# Patient Record
Sex: MALE | Race: WHITE | Employment: OTHER | URBAN - METROPOLITAN AREA
[De-identification: names, ages, dates, MRNs, and addresses within clinical notes are randomized per-mention and may not be internally consistent; named-entity substitution may affect disease eponyms.]

---

## 2017-01-17 ENCOUNTER — HOSPITAL ENCOUNTER (OUTPATIENT)
Dept: ULTRASOUND IMAGING | Age: 82
Discharge: HOME | End: 2017-01-17
Admitting: INTERNAL MEDICINE

## 2017-01-17 DIAGNOSIS — Z13.6 SCREENING FOR AAA (ABDOMINAL AORTIC ANEURYSM): ICD-10-CM

## 2017-01-17 DIAGNOSIS — I65.29 STENOSIS OF CAROTID ARTERY, UNSPECIFIED LATERALITY: Primary | ICD-10-CM

## 2017-01-17 DIAGNOSIS — I71.9 AORTIC ANEURYSM WITHOUT RUPTURE (HCC): ICD-10-CM

## 2017-01-20 ENCOUNTER — OFFICE VISIT (OUTPATIENT)
Dept: PULMONOLOGY | Age: 82
End: 2017-01-20

## 2017-01-20 VITALS
BODY MASS INDEX: 33.45 KG/M2 | WEIGHT: 201 LBS | SYSTOLIC BLOOD PRESSURE: 96 MMHG | OXYGEN SATURATION: 92 % | DIASTOLIC BLOOD PRESSURE: 54 MMHG | HEART RATE: 72 BPM

## 2017-01-20 DIAGNOSIS — G47.33 OBSTRUCTIVE SLEEP APNEA: ICD-10-CM

## 2017-01-20 DIAGNOSIS — J44.9 COPD, MILD (HCC): Primary | ICD-10-CM

## 2017-01-20 DIAGNOSIS — J84.10 PULMONARY FIBROSIS (HCC): ICD-10-CM

## 2017-01-20 DIAGNOSIS — J96.11 CHRONIC RESPIRATORY FAILURE WITH HYPOXIA (HCC): ICD-10-CM

## 2017-01-20 DIAGNOSIS — J43.2 CENTRILOBULAR EMPHYSEMA (HCC): ICD-10-CM

## 2017-01-20 PROCEDURE — G8598 ASA/ANTIPLAT THER USED: HCPCS | Performed by: INTERNAL MEDICINE

## 2017-01-20 PROCEDURE — 4040F PNEUMOC VAC/ADMIN/RCVD: CPT | Performed by: INTERNAL MEDICINE

## 2017-01-20 PROCEDURE — 3023F SPIROM DOC REV: CPT | Performed by: INTERNAL MEDICINE

## 2017-01-20 PROCEDURE — G8427 DOCREV CUR MEDS BY ELIG CLIN: HCPCS | Performed by: INTERNAL MEDICINE

## 2017-01-20 PROCEDURE — 1036F TOBACCO NON-USER: CPT | Performed by: INTERNAL MEDICINE

## 2017-01-20 PROCEDURE — G8484 FLU IMMUNIZE NO ADMIN: HCPCS | Performed by: INTERNAL MEDICINE

## 2017-01-20 PROCEDURE — G8419 CALC BMI OUT NRM PARAM NOF/U: HCPCS | Performed by: INTERNAL MEDICINE

## 2017-01-20 PROCEDURE — 99214 OFFICE O/P EST MOD 30 MIN: CPT | Performed by: INTERNAL MEDICINE

## 2017-01-20 PROCEDURE — 1123F ACP DISCUSS/DSCN MKR DOCD: CPT | Performed by: INTERNAL MEDICINE

## 2017-01-20 PROCEDURE — G8926 SPIRO NO PERF OR DOC: HCPCS | Performed by: INTERNAL MEDICINE

## 2017-03-03 ENCOUNTER — OFFICE VISIT (OUTPATIENT)
Dept: FAMILY MEDICINE CLINIC | Age: 82
End: 2017-03-03

## 2017-03-03 VITALS
OXYGEN SATURATION: 93 % | DIASTOLIC BLOOD PRESSURE: 58 MMHG | BODY MASS INDEX: 32.78 KG/M2 | HEART RATE: 91 BPM | SYSTOLIC BLOOD PRESSURE: 132 MMHG | WEIGHT: 197 LBS

## 2017-03-03 DIAGNOSIS — E78.00 PURE HYPERCHOLESTEROLEMIA: ICD-10-CM

## 2017-03-03 DIAGNOSIS — I65.23 BILATERAL CAROTID ARTERY STENOSIS: ICD-10-CM

## 2017-03-03 DIAGNOSIS — E66.09 NON MORBID OBESITY DUE TO EXCESS CALORIES: ICD-10-CM

## 2017-03-03 DIAGNOSIS — I10 ESSENTIAL HYPERTENSION: ICD-10-CM

## 2017-03-03 DIAGNOSIS — Z99.81 OXYGEN DEPENDENT: ICD-10-CM

## 2017-03-03 DIAGNOSIS — I25.10 CORONARY ARTERY DISEASE INVOLVING NATIVE CORONARY ARTERY OF NATIVE HEART WITHOUT ANGINA PECTORIS: ICD-10-CM

## 2017-03-03 DIAGNOSIS — H35.351 CYSTOID MACULAR DEGENERATION OF RIGHT EYE: ICD-10-CM

## 2017-03-03 DIAGNOSIS — J44.1 COPD WITH EXACERBATION (HCC): Primary | ICD-10-CM

## 2017-03-03 DIAGNOSIS — E11.9 TYPE 2 DIABETES MELLITUS WITHOUT COMPLICATION, WITHOUT LONG-TERM CURRENT USE OF INSULIN (HCC): ICD-10-CM

## 2017-03-03 PROBLEM — H35.30 MACULAR DEGENERATION: Status: ACTIVE | Noted: 2017-03-03

## 2017-03-03 PROCEDURE — G8419 CALC BMI OUT NRM PARAM NOF/U: HCPCS | Performed by: FAMILY MEDICINE

## 2017-03-03 PROCEDURE — 3023F SPIROM DOC REV: CPT | Performed by: FAMILY MEDICINE

## 2017-03-03 PROCEDURE — G8427 DOCREV CUR MEDS BY ELIG CLIN: HCPCS | Performed by: FAMILY MEDICINE

## 2017-03-03 PROCEDURE — G8484 FLU IMMUNIZE NO ADMIN: HCPCS | Performed by: FAMILY MEDICINE

## 2017-03-03 PROCEDURE — 1036F TOBACCO NON-USER: CPT | Performed by: FAMILY MEDICINE

## 2017-03-03 PROCEDURE — 1123F ACP DISCUSS/DSCN MKR DOCD: CPT | Performed by: FAMILY MEDICINE

## 2017-03-03 PROCEDURE — G8926 SPIRO NO PERF OR DOC: HCPCS | Performed by: FAMILY MEDICINE

## 2017-03-03 PROCEDURE — 4040F PNEUMOC VAC/ADMIN/RCVD: CPT | Performed by: FAMILY MEDICINE

## 2017-03-03 PROCEDURE — 99203 OFFICE O/P NEW LOW 30 MIN: CPT | Performed by: FAMILY MEDICINE

## 2017-03-03 PROCEDURE — G8598 ASA/ANTIPLAT THER USED: HCPCS | Performed by: FAMILY MEDICINE

## 2017-03-03 RX ORDER — PREDNISONE 20 MG/1
TABLET ORAL
Qty: 16 TABLET | Refills: 0 | Status: SHIPPED | OUTPATIENT
Start: 2017-03-03 | End: 2017-07-22

## 2017-05-31 ENCOUNTER — OFFICE VISIT (OUTPATIENT)
Dept: FAMILY MEDICINE CLINIC | Age: 82
End: 2017-05-31

## 2017-05-31 VITALS
WEIGHT: 195 LBS | DIASTOLIC BLOOD PRESSURE: 54 MMHG | BODY MASS INDEX: 32.45 KG/M2 | SYSTOLIC BLOOD PRESSURE: 122 MMHG | OXYGEN SATURATION: 93 % | HEART RATE: 87 BPM

## 2017-05-31 DIAGNOSIS — R00.2 PALPITATION: ICD-10-CM

## 2017-05-31 DIAGNOSIS — I10 ESSENTIAL HYPERTENSION: ICD-10-CM

## 2017-05-31 DIAGNOSIS — M10.071 ACUTE IDIOPATHIC GOUT INVOLVING TOE OF RIGHT FOOT: ICD-10-CM

## 2017-05-31 DIAGNOSIS — E11.9 TYPE 2 DIABETES MELLITUS WITHOUT COMPLICATION, WITHOUT LONG-TERM CURRENT USE OF INSULIN (HCC): Primary | ICD-10-CM

## 2017-05-31 PROCEDURE — 1036F TOBACCO NON-USER: CPT | Performed by: FAMILY MEDICINE

## 2017-05-31 PROCEDURE — G8598 ASA/ANTIPLAT THER USED: HCPCS | Performed by: FAMILY MEDICINE

## 2017-05-31 PROCEDURE — G8419 CALC BMI OUT NRM PARAM NOF/U: HCPCS | Performed by: FAMILY MEDICINE

## 2017-05-31 PROCEDURE — 1123F ACP DISCUSS/DSCN MKR DOCD: CPT | Performed by: FAMILY MEDICINE

## 2017-05-31 PROCEDURE — G8427 DOCREV CUR MEDS BY ELIG CLIN: HCPCS | Performed by: FAMILY MEDICINE

## 2017-05-31 PROCEDURE — 99214 OFFICE O/P EST MOD 30 MIN: CPT | Performed by: FAMILY MEDICINE

## 2017-05-31 PROCEDURE — 93000 ELECTROCARDIOGRAM COMPLETE: CPT | Performed by: FAMILY MEDICINE

## 2017-05-31 PROCEDURE — 4040F PNEUMOC VAC/ADMIN/RCVD: CPT | Performed by: FAMILY MEDICINE

## 2017-05-31 RX ORDER — METOPROLOL SUCCINATE 50 MG/1
50 TABLET, EXTENDED RELEASE ORAL NIGHTLY
Qty: 90 TABLET | Refills: 3 | Status: SHIPPED | OUTPATIENT
Start: 2017-05-31 | End: 2018-06-07 | Stop reason: SDUPTHER

## 2017-05-31 RX ORDER — CLOPIDOGREL BISULFATE 75 MG/1
75 TABLET ORAL DAILY
Qty: 90 TABLET | Refills: 3 | Status: SHIPPED | OUTPATIENT
Start: 2017-05-31 | End: 2018-06-07 | Stop reason: SDUPTHER

## 2017-05-31 RX ORDER — SIMVASTATIN 40 MG
40 TABLET ORAL NIGHTLY
Qty: 90 TABLET | Refills: 3 | Status: SHIPPED | OUTPATIENT
Start: 2017-05-31 | End: 2018-03-21 | Stop reason: SDUPTHER

## 2017-05-31 RX ORDER — LISINOPRIL 5 MG/1
5 TABLET ORAL DAILY
Qty: 90 TABLET | Refills: 3 | Status: SHIPPED | OUTPATIENT
Start: 2017-05-31 | End: 2018-07-05 | Stop reason: SDUPTHER

## 2017-06-01 LAB
MAGNESIUM: 1.9 MG/DL (ref 1.5–2.5)
TSH ULTRASENSITIVE: 0.96 MIU/L (ref 0.4–4.5)
URIC ACID, SERUM: 8.5 MG/DL (ref 4–8)

## 2017-06-07 RX ORDER — ALLOPURINOL 100 MG/1
100 TABLET ORAL DAILY
Qty: 30 TABLET | Refills: 2 | Status: SHIPPED | OUTPATIENT
Start: 2017-06-07 | End: 2017-09-06 | Stop reason: SDUPTHER

## 2017-06-26 RX ORDER — LANCETS 33 GAUGE
EACH MISCELLANEOUS
Qty: 100 EACH | Refills: 12 | Status: SHIPPED | OUTPATIENT
Start: 2017-06-26 | End: 2021-01-01 | Stop reason: SDUPTHER

## 2017-07-21 ENCOUNTER — PATIENT MESSAGE (OUTPATIENT)
Dept: FAMILY MEDICINE CLINIC | Age: 82
End: 2017-07-21

## 2017-07-25 ENCOUNTER — OFFICE VISIT (OUTPATIENT)
Dept: PULMONOLOGY | Age: 82
End: 2017-07-25

## 2017-07-25 VITALS
HEART RATE: 69 BPM | WEIGHT: 196.6 LBS | BODY MASS INDEX: 32.72 KG/M2 | RESPIRATION RATE: 22 BRPM | OXYGEN SATURATION: 95 % | DIASTOLIC BLOOD PRESSURE: 65 MMHG | SYSTOLIC BLOOD PRESSURE: 121 MMHG

## 2017-07-25 DIAGNOSIS — J84.10 PULMONARY FIBROSIS (HCC): ICD-10-CM

## 2017-07-25 DIAGNOSIS — G47.33 OBSTRUCTIVE SLEEP APNEA: ICD-10-CM

## 2017-07-25 DIAGNOSIS — J44.9 COPD, MILD (HCC): Primary | ICD-10-CM

## 2017-07-25 DIAGNOSIS — J96.11 CHRONIC RESPIRATORY FAILURE WITH HYPOXIA (HCC): ICD-10-CM

## 2017-07-25 PROCEDURE — G8598 ASA/ANTIPLAT THER USED: HCPCS | Performed by: INTERNAL MEDICINE

## 2017-07-25 PROCEDURE — G8419 CALC BMI OUT NRM PARAM NOF/U: HCPCS | Performed by: INTERNAL MEDICINE

## 2017-07-25 PROCEDURE — 99214 OFFICE O/P EST MOD 30 MIN: CPT | Performed by: INTERNAL MEDICINE

## 2017-07-25 PROCEDURE — 1123F ACP DISCUSS/DSCN MKR DOCD: CPT | Performed by: INTERNAL MEDICINE

## 2017-07-25 PROCEDURE — 4040F PNEUMOC VAC/ADMIN/RCVD: CPT | Performed by: INTERNAL MEDICINE

## 2017-07-25 PROCEDURE — 1036F TOBACCO NON-USER: CPT | Performed by: INTERNAL MEDICINE

## 2017-07-25 PROCEDURE — G8926 SPIRO NO PERF OR DOC: HCPCS | Performed by: INTERNAL MEDICINE

## 2017-07-25 PROCEDURE — 3023F SPIROM DOC REV: CPT | Performed by: INTERNAL MEDICINE

## 2017-07-25 PROCEDURE — G8427 DOCREV CUR MEDS BY ELIG CLIN: HCPCS | Performed by: INTERNAL MEDICINE

## 2017-07-25 RX ORDER — PREDNISONE 10 MG/1
TABLET ORAL
Qty: 30 TABLET | Refills: 0 | Status: SHIPPED | OUTPATIENT
Start: 2017-07-25 | End: 2017-08-04

## 2017-08-22 RX ORDER — BUDESONIDE AND FORMOTEROL FUMARATE DIHYDRATE 160; 4.5 UG/1; UG/1
AEROSOL RESPIRATORY (INHALATION)
Qty: 3 INHALER | Refills: 3 | Status: SHIPPED | OUTPATIENT
Start: 2017-08-22 | End: 2017-11-30

## 2017-08-24 ENCOUNTER — TELEPHONE (OUTPATIENT)
Dept: FAMILY MEDICINE CLINIC | Age: 82
End: 2017-08-24

## 2017-08-25 RX ORDER — BUDESONIDE AND FORMOTEROL FUMARATE DIHYDRATE 160; 4.5 UG/1; UG/1
AEROSOL RESPIRATORY (INHALATION)
Qty: 1 INHALER | Refills: 6 | Status: SHIPPED | OUTPATIENT
Start: 2017-08-25 | End: 2019-01-16 | Stop reason: SDUPTHER

## 2017-09-06 RX ORDER — ALLOPURINOL 100 MG/1
TABLET ORAL
Qty: 30 TABLET | Refills: 1 | Status: SHIPPED | OUTPATIENT
Start: 2017-09-06 | End: 2017-11-07

## 2017-10-17 ENCOUNTER — PATIENT MESSAGE (OUTPATIENT)
Dept: FAMILY MEDICINE CLINIC | Age: 82
End: 2017-10-17

## 2017-10-17 NOTE — TELEPHONE ENCOUNTER
From: Brionna Jaffe  To: Manan Cross MD  Sent: 10/17/2017 12:52 PM EDT  Subject: Prescription Question    I am in need of script for one touch Dianna test strip    Thanks

## 2017-11-01 ENCOUNTER — HOSPITAL ENCOUNTER (OUTPATIENT)
Dept: OTHER | Age: 82
Discharge: HOME | End: 2017-11-30
Attending: INTERNAL MEDICINE | Admitting: INTERNAL MEDICINE

## 2017-11-07 RX ORDER — ALLOPURINOL 100 MG/1
TABLET ORAL
Qty: 30 TABLET | Refills: 0 | Status: SHIPPED | OUTPATIENT
Start: 2017-11-07 | End: 2017-12-07

## 2017-11-30 ENCOUNTER — OFFICE VISIT (OUTPATIENT)
Dept: FAMILY MEDICINE CLINIC | Age: 82
End: 2017-11-30

## 2017-11-30 VITALS
BODY MASS INDEX: 32.78 KG/M2 | OXYGEN SATURATION: 93 % | HEART RATE: 76 BPM | SYSTOLIC BLOOD PRESSURE: 112 MMHG | WEIGHT: 197 LBS | DIASTOLIC BLOOD PRESSURE: 58 MMHG

## 2017-11-30 DIAGNOSIS — M1A.9XX0 CHRONIC GOUT WITHOUT TOPHUS, UNSPECIFIED CAUSE, UNSPECIFIED SITE: ICD-10-CM

## 2017-11-30 DIAGNOSIS — E78.00 PURE HYPERCHOLESTEROLEMIA: ICD-10-CM

## 2017-11-30 DIAGNOSIS — E11.9 TYPE 2 DIABETES MELLITUS WITHOUT COMPLICATION, WITHOUT LONG-TERM CURRENT USE OF INSULIN (HCC): Primary | ICD-10-CM

## 2017-11-30 DIAGNOSIS — I10 ESSENTIAL HYPERTENSION: ICD-10-CM

## 2017-11-30 PROCEDURE — 99214 OFFICE O/P EST MOD 30 MIN: CPT | Performed by: FAMILY MEDICINE

## 2017-11-30 PROCEDURE — 1123F ACP DISCUSS/DSCN MKR DOCD: CPT | Performed by: FAMILY MEDICINE

## 2017-11-30 PROCEDURE — G8484 FLU IMMUNIZE NO ADMIN: HCPCS | Performed by: FAMILY MEDICINE

## 2017-11-30 PROCEDURE — 1036F TOBACCO NON-USER: CPT | Performed by: FAMILY MEDICINE

## 2017-11-30 PROCEDURE — G8598 ASA/ANTIPLAT THER USED: HCPCS | Performed by: FAMILY MEDICINE

## 2017-11-30 PROCEDURE — 4040F PNEUMOC VAC/ADMIN/RCVD: CPT | Performed by: FAMILY MEDICINE

## 2017-11-30 PROCEDURE — G8417 CALC BMI ABV UP PARAM F/U: HCPCS | Performed by: FAMILY MEDICINE

## 2017-11-30 PROCEDURE — G8427 DOCREV CUR MEDS BY ELIG CLIN: HCPCS | Performed by: FAMILY MEDICINE

## 2017-11-30 RX ORDER — OMEGA-3-ACID ETHYL ESTERS 1 G/1
CAPSULE, LIQUID FILLED ORAL
COMMUNITY
Start: 2017-11-13 | End: 2017-11-30

## 2017-11-30 RX ORDER — ALBUTEROL SULFATE 90 UG/1
2 AEROSOL, METERED RESPIRATORY (INHALATION) PRN
Qty: 3 INHALER | Refills: 3 | Status: SHIPPED | OUTPATIENT
Start: 2017-11-30 | End: 2017-12-05 | Stop reason: SDUPTHER

## 2017-11-30 ASSESSMENT — PATIENT HEALTH QUESTIONNAIRE - PHQ9
SUM OF ALL RESPONSES TO PHQ QUESTIONS 1-9: 0
1. LITTLE INTEREST OR PLEASURE IN DOING THINGS: 0
2. FEELING DOWN, DEPRESSED OR HOPELESS: 0
SUM OF ALL RESPONSES TO PHQ9 QUESTIONS 1 & 2: 0

## 2017-11-30 NOTE — PROGRESS NOTES
hypertension  -Stable, continue current medications. Chronic gout without tophus, unspecified cause, unspecified site  -     URIC ACID; Future  Recheck labs  No sx  Cont allopurinol    Pure hypercholesterolemia  Discussed stopping omega 3 due to cost, recheck chol next visit  Cont zocor    Other orders  -     albuterol sulfate HFA (PROAIR HFA) 108 (90 Base) MCG/ACT inhaler;  Inhale 2 puffs into the lungs as needed for Shortness of Breath

## 2017-12-01 ENCOUNTER — HOSPITAL ENCOUNTER (OUTPATIENT)
Dept: OTHER | Age: 82
Discharge: HOME | End: 2017-12-31
Attending: INTERNAL MEDICINE | Admitting: INTERNAL MEDICINE

## 2017-12-05 RX ORDER — ALBUTEROL SULFATE 90 UG/1
2 AEROSOL, METERED RESPIRATORY (INHALATION) DAILY PRN
Qty: 3 INHALER | Refills: 3 | Status: SHIPPED | OUTPATIENT
Start: 2017-12-05 | End: 2018-07-10 | Stop reason: SDUPTHER

## 2017-12-07 RX ORDER — ALLOPURINOL 100 MG/1
TABLET ORAL
Qty: 30 TABLET | Refills: 5 | Status: SHIPPED | OUTPATIENT
Start: 2017-12-07 | End: 2018-03-28 | Stop reason: SDUPTHER

## 2018-01-01 ENCOUNTER — HOSPITAL ENCOUNTER (OUTPATIENT)
Dept: OTHER | Age: 83
Discharge: HOME | End: 2018-01-31
Attending: INTERNAL MEDICINE | Admitting: INTERNAL MEDICINE

## 2018-01-29 ENCOUNTER — OFFICE VISIT (OUTPATIENT)
Dept: PULMONOLOGY | Age: 83
End: 2018-01-29

## 2018-01-29 VITALS
SYSTOLIC BLOOD PRESSURE: 104 MMHG | DIASTOLIC BLOOD PRESSURE: 60 MMHG | OXYGEN SATURATION: 92 % | WEIGHT: 192 LBS | BODY MASS INDEX: 31.95 KG/M2 | HEART RATE: 75 BPM

## 2018-01-29 DIAGNOSIS — G47.33 OBSTRUCTIVE SLEEP APNEA: ICD-10-CM

## 2018-01-29 DIAGNOSIS — J44.9 COPD, MILD (HCC): ICD-10-CM

## 2018-01-29 DIAGNOSIS — J43.2 CENTRILOBULAR EMPHYSEMA (HCC): Primary | ICD-10-CM

## 2018-01-29 DIAGNOSIS — J84.10 PULMONARY FIBROSIS (HCC): ICD-10-CM

## 2018-01-29 PROCEDURE — G8484 FLU IMMUNIZE NO ADMIN: HCPCS | Performed by: INTERNAL MEDICINE

## 2018-01-29 PROCEDURE — G8427 DOCREV CUR MEDS BY ELIG CLIN: HCPCS | Performed by: INTERNAL MEDICINE

## 2018-01-29 PROCEDURE — 3023F SPIROM DOC REV: CPT | Performed by: INTERNAL MEDICINE

## 2018-01-29 PROCEDURE — G8598 ASA/ANTIPLAT THER USED: HCPCS | Performed by: INTERNAL MEDICINE

## 2018-01-29 PROCEDURE — G8417 CALC BMI ABV UP PARAM F/U: HCPCS | Performed by: INTERNAL MEDICINE

## 2018-01-29 PROCEDURE — 1036F TOBACCO NON-USER: CPT | Performed by: INTERNAL MEDICINE

## 2018-01-29 PROCEDURE — G8926 SPIRO NO PERF OR DOC: HCPCS | Performed by: INTERNAL MEDICINE

## 2018-01-29 PROCEDURE — 99214 OFFICE O/P EST MOD 30 MIN: CPT | Performed by: INTERNAL MEDICINE

## 2018-01-29 PROCEDURE — 1123F ACP DISCUSS/DSCN MKR DOCD: CPT | Performed by: INTERNAL MEDICINE

## 2018-01-29 PROCEDURE — 4040F PNEUMOC VAC/ADMIN/RCVD: CPT | Performed by: INTERNAL MEDICINE

## 2018-01-29 NOTE — PROGRESS NOTES
[facility]  Progress Note  Abbe Primrose, MD Renae Suma   YOB: 1932    Date of Visit:  1/29/2018    Assessment/Plan:  1. COPD, mild ([address]  PFT 1/16:  INTERPRETATION:  1. Spirometry showed increased FVC of 3.23 L, 125% predicted, and normal  FEV-1 of 1.98 L, 101% predicted. The FEV-1/FVC ratio was decreased at 61%. No response to bronchodilators demonstrated on spirometry. 2. Lung volume showed normal total lung capacity of 103% with increased  vital capacity of 125% predicted. 3. Diffusion capacity showed decreased DLCO of 59% predicted.      IMPRESSION: Mild obstructive defect on spirometry with no response to  bronchodilators. Some air trapping was present on lung volumes and moderate  decrease in diffusion capacity. In comparison to the test that was done in  February of 2014, no significant change in forced vital capacity and FEV-1  noted, but total lung capacity was decreased by 15% and diffusion capacity of  carbon monoxide by 10%. Symbicort  Spiriva  HHN QAM and prn  Exercising  Repeat PFT    2. Centrilobular emphysema ([address]  CT Chest: 7/17:    FINDINGS:   Pulmonary Arteries: Pulmonary arteries are adequately opacified for   evaluation.  No evidence of intraluminal filling defect to suggest pulmonary   embolism.  Main pulmonary artery is normal in caliber.       Mediastinum: Borderline enlarged lymph nodes within the mediastinum are   unchanged.  No thoracic aortic dissection.       Lungs/pleura: Severe emphysema is noted in the upper lungs.  Interstitial   fibrosis within the lower lungs is re- demonstrated.       Upper Abdomen: Limited images of the upper abdomen are unremarkable.       Soft Tissues/Bones: No acute bone or soft tissue abnormality.            3. Pulmonary fibrosis ([address]  CT Chest: 7/17:    FINDINGS:   Pulmonary Arteries: Pulmonary arteries are adequately opacified for   evaluation.  No evidence of intraluminal Smoking Status    Former Smoker    Packs/day: 2.00    Years: 50.00    Quit date: 1/1/2001   Smokeless Tobacco    Never Used

## 2018-02-01 ENCOUNTER — HOSPITAL ENCOUNTER (OUTPATIENT)
Dept: OTHER | Age: 83
Discharge: HOME | End: 2018-02-28
Attending: INTERNAL MEDICINE | Admitting: INTERNAL MEDICINE

## 2018-02-06 ENCOUNTER — HOSPITAL ENCOUNTER (OUTPATIENT)
Dept: PULMONOLOGY | Age: 83
Discharge: HOME | End: 2018-02-06
Attending: INTERNAL MEDICINE | Admitting: INTERNAL MEDICINE

## 2018-02-06 VITALS — HEART RATE: 66 BPM | OXYGEN SATURATION: 95 %

## 2018-02-06 DIAGNOSIS — J43.2 CENTRILOBULAR EMPHYSEMA (HCC): ICD-10-CM

## 2018-02-08 NOTE — PROCEDURES
[facility]                      [facility], [address]                                PULMONARY FUNCTION    PATIENT NAME: Levi Oswald                     :        10/03/1932  MED REC NO:   [id number]                          ROOM:  ACCOUNT NO:   [de-identified]                          ADMIT DATE: 2018  PROVIDER:     Suzan Salmeron MD    DATE OF PROCEDURE:  2018    INDICATION:  Emphysema. INTERPRETATION:  Spirometry attempts were acceptable and reproducible. FVC  was normal at 3.48 liters, 119% predicted and normal FEV1 of 2.02 liters,  101% predicted. FEV1/FVC ratio was decreased at 58%. Lung volumes showed  normal total lung capacity of 112% predicted. Diffusion capacity showed  decreased DLCO of 48% predicted. IMPRESSION:  Mild obstructive defect on spirometry with normal lung volumes  but moderate-to-severe decrease in diffusion capacity. In comparison to  the test that was done in 2016, no significant change in FVC or FEV1. Total lung capacity has improved by 9% and DLCO decreased by 10%.         Grant Kirkland MD    D: 2018 11:23:43       T: 2018 11:25:46     [email or web address]  Job#: [id number]     Doc#: [id number]    CC:

## 2018-03-01 ENCOUNTER — HOSPITAL ENCOUNTER (OUTPATIENT)
Dept: OTHER | Age: 83
Discharge: HOME | End: 2018-03-31
Attending: INTERNAL MEDICINE | Admitting: INTERNAL MEDICINE

## 2018-03-21 RX ORDER — SIMVASTATIN 40 MG
40 TABLET ORAL NIGHTLY
Qty: 90 TABLET | Refills: 3 | Status: SHIPPED | OUTPATIENT
Start: 2018-03-21 | End: 2019-01-16 | Stop reason: SDUPTHER

## 2018-03-28 RX ORDER — ALLOPURINOL 100 MG/1
TABLET ORAL
Qty: 90 TABLET | Refills: 3 | Status: SHIPPED | OUTPATIENT
Start: 2018-03-28 | End: 2019-01-16 | Stop reason: SDUPTHER

## 2018-04-01 ENCOUNTER — HOSPITAL ENCOUNTER (OUTPATIENT)
Dept: OTHER | Age: 83
Discharge: HOME | End: 2018-04-30
Attending: INTERNAL MEDICINE | Admitting: INTERNAL MEDICINE

## 2018-05-01 ENCOUNTER — HOSPITAL ENCOUNTER (OUTPATIENT)
Dept: OTHER | Age: 83
Discharge: HOME | End: 2018-05-31
Attending: INTERNAL MEDICINE | Admitting: INTERNAL MEDICINE

## 2018-05-30 ENCOUNTER — OFFICE VISIT (OUTPATIENT)
Dept: FAMILY MEDICINE CLINIC | Age: 83
End: 2018-05-30

## 2018-05-30 VITALS
HEART RATE: 82 BPM | OXYGEN SATURATION: 92 % | WEIGHT: 193 LBS | BODY MASS INDEX: 32.12 KG/M2 | SYSTOLIC BLOOD PRESSURE: 104 MMHG | DIASTOLIC BLOOD PRESSURE: 48 MMHG

## 2018-05-30 DIAGNOSIS — I10 ESSENTIAL HYPERTENSION: ICD-10-CM

## 2018-05-30 DIAGNOSIS — E11.9 TYPE 2 DIABETES MELLITUS WITHOUT COMPLICATION, WITHOUT LONG-TERM CURRENT USE OF INSULIN (HCC): Primary | ICD-10-CM

## 2018-05-30 DIAGNOSIS — M67.479 MUCOUS CYST OF TOE: ICD-10-CM

## 2018-05-30 DIAGNOSIS — E78.00 PURE HYPERCHOLESTEROLEMIA: ICD-10-CM

## 2018-05-30 DIAGNOSIS — M1A.9XX0 CHRONIC GOUT WITHOUT TOPHUS, UNSPECIFIED CAUSE, UNSPECIFIED SITE: ICD-10-CM

## 2018-05-30 DIAGNOSIS — R00.0 TACHYCARDIA: ICD-10-CM

## 2018-05-30 PROCEDURE — 1123F ACP DISCUSS/DSCN MKR DOCD: CPT | Performed by: FAMILY MEDICINE

## 2018-05-30 PROCEDURE — 99214 OFFICE O/P EST MOD 30 MIN: CPT | Performed by: FAMILY MEDICINE

## 2018-05-30 PROCEDURE — G8598 ASA/ANTIPLAT THER USED: HCPCS | Performed by: FAMILY MEDICINE

## 2018-05-30 PROCEDURE — 93000 ELECTROCARDIOGRAM COMPLETE: CPT | Performed by: FAMILY MEDICINE

## 2018-05-30 PROCEDURE — G8417 CALC BMI ABV UP PARAM F/U: HCPCS | Performed by: FAMILY MEDICINE

## 2018-05-30 PROCEDURE — G8427 DOCREV CUR MEDS BY ELIG CLIN: HCPCS | Performed by: FAMILY MEDICINE

## 2018-05-30 PROCEDURE — 1036F TOBACCO NON-USER: CPT | Performed by: FAMILY MEDICINE

## 2018-05-30 PROCEDURE — 4040F PNEUMOC VAC/ADMIN/RCVD: CPT | Performed by: FAMILY MEDICINE

## 2018-06-01 ENCOUNTER — TELEPHONE (OUTPATIENT)
Dept: FAMILY MEDICINE CLINIC | Age: 83
End: 2018-06-01

## 2018-06-01 ENCOUNTER — HOSPITAL ENCOUNTER (OUTPATIENT)
Dept: OTHER | Age: 83
Discharge: HOME | End: 2018-06-30
Attending: INTERNAL MEDICINE | Admitting: INTERNAL MEDICINE

## 2018-06-01 DIAGNOSIS — R00.2 PALPITATIONS: Primary | ICD-10-CM

## 2018-06-07 RX ORDER — CLOPIDOGREL BISULFATE 75 MG/1
75 TABLET ORAL DAILY
Qty: 90 TABLET | Refills: 3 | Status: SHIPPED | OUTPATIENT
Start: 2018-06-07 | End: 2019-01-16 | Stop reason: SDUPTHER

## 2018-06-07 RX ORDER — METOPROLOL SUCCINATE 50 MG/1
50 TABLET, EXTENDED RELEASE ORAL NIGHTLY
Qty: 90 TABLET | Refills: 3 | Status: SHIPPED | OUTPATIENT
Start: 2018-06-07 | End: 2019-01-16 | Stop reason: SDUPTHER

## 2018-06-25 ENCOUNTER — OFFICE VISIT (OUTPATIENT)
Dept: PULMONOLOGY | Age: 83
End: 2018-06-25

## 2018-06-25 VITALS
OXYGEN SATURATION: 94 % | BODY MASS INDEX: 31.45 KG/M2 | DIASTOLIC BLOOD PRESSURE: 59 MMHG | HEART RATE: 77 BPM | SYSTOLIC BLOOD PRESSURE: 121 MMHG | WEIGHT: 189 LBS

## 2018-06-25 DIAGNOSIS — J96.11 CHRONIC RESPIRATORY FAILURE WITH HYPOXIA (HCC): ICD-10-CM

## 2018-06-25 DIAGNOSIS — J44.9 COPD, MILD (HCC): Primary | ICD-10-CM

## 2018-06-25 DIAGNOSIS — J43.2 CENTRILOBULAR EMPHYSEMA (HCC): ICD-10-CM

## 2018-06-25 DIAGNOSIS — J84.10 PULMONARY FIBROSIS (HCC): ICD-10-CM

## 2018-06-25 DIAGNOSIS — G47.33 OBSTRUCTIVE SLEEP APNEA: ICD-10-CM

## 2018-06-25 PROCEDURE — 1036F TOBACCO NON-USER: CPT | Performed by: INTERNAL MEDICINE

## 2018-06-25 PROCEDURE — G8427 DOCREV CUR MEDS BY ELIG CLIN: HCPCS | Performed by: INTERNAL MEDICINE

## 2018-06-25 PROCEDURE — 3023F SPIROM DOC REV: CPT | Performed by: INTERNAL MEDICINE

## 2018-06-25 PROCEDURE — G8417 CALC BMI ABV UP PARAM F/U: HCPCS | Performed by: INTERNAL MEDICINE

## 2018-06-25 PROCEDURE — 4040F PNEUMOC VAC/ADMIN/RCVD: CPT | Performed by: INTERNAL MEDICINE

## 2018-06-25 PROCEDURE — 99214 OFFICE O/P EST MOD 30 MIN: CPT | Performed by: INTERNAL MEDICINE

## 2018-06-25 PROCEDURE — 1123F ACP DISCUSS/DSCN MKR DOCD: CPT | Performed by: INTERNAL MEDICINE

## 2018-06-25 PROCEDURE — G8926 SPIRO NO PERF OR DOC: HCPCS | Performed by: INTERNAL MEDICINE

## 2018-06-25 PROCEDURE — G8598 ASA/ANTIPLAT THER USED: HCPCS | Performed by: INTERNAL MEDICINE

## 2018-06-25 RX ORDER — PREDNISONE 10 MG/1
TABLET ORAL
Qty: 30 TABLET | Refills: 0 | Status: SHIPPED | OUTPATIENT
Start: 2018-06-25 | End: 2018-07-05

## 2018-07-01 ENCOUNTER — HOSPITAL ENCOUNTER (OUTPATIENT)
Dept: OTHER | Age: 83
Discharge: HOME | End: 2018-07-31
Attending: INTERNAL MEDICINE | Admitting: INTERNAL MEDICINE

## 2018-07-09 ENCOUNTER — PATIENT MESSAGE (OUTPATIENT)
Dept: FAMILY MEDICINE CLINIC | Age: 83
End: 2018-07-09

## 2018-07-10 RX ORDER — ALBUTEROL SULFATE 2.5 MG/3ML
2.5 SOLUTION RESPIRATORY (INHALATION) EVERY 6 HOURS PRN
Qty: 120 EACH | Refills: 12 | Status: CANCELLED | OUTPATIENT
Start: 2018-07-10

## 2018-07-10 RX ORDER — ALBUTEROL SULFATE 90 UG/1
2 AEROSOL, METERED RESPIRATORY (INHALATION) DAILY PRN
Qty: 3 INHALER | Refills: 3 | Status: SHIPPED | OUTPATIENT
Start: 2018-07-10 | End: 2019-01-16 | Stop reason: SDUPTHER

## 2018-07-10 RX ORDER — ALBUTEROL SULFATE 2.5 MG/3ML
2.5 SOLUTION RESPIRATORY (INHALATION) EVERY 6 HOURS PRN
Qty: 120 EACH | Refills: 12 | Status: SHIPPED | OUTPATIENT
Start: 2018-07-10 | End: 2019-07-29 | Stop reason: SDUPTHER

## 2018-07-25 RX ORDER — BUDESONIDE AND FORMOTEROL FUMARATE DIHYDRATE 160; 4.5 UG/1; UG/1
AEROSOL RESPIRATORY (INHALATION)
Qty: 3 INHALER | Refills: 3 | Status: SHIPPED | OUTPATIENT
Start: 2018-07-25 | End: 2018-11-30

## 2018-07-31 ENCOUNTER — OFFICE VISIT (OUTPATIENT)
Dept: PULMONOLOGY | Age: 83
End: 2018-07-31

## 2018-07-31 VITALS
OXYGEN SATURATION: 90 % | WEIGHT: 189 LBS | DIASTOLIC BLOOD PRESSURE: 45 MMHG | HEART RATE: 83 BPM | SYSTOLIC BLOOD PRESSURE: 101 MMHG | BODY MASS INDEX: 31.45 KG/M2

## 2018-07-31 DIAGNOSIS — G47.33 OBSTRUCTIVE SLEEP APNEA: ICD-10-CM

## 2018-07-31 DIAGNOSIS — J84.10 PULMONARY FIBROSIS (HCC): ICD-10-CM

## 2018-07-31 DIAGNOSIS — J44.9 COPD, MILD (HCC): Primary | ICD-10-CM

## 2018-07-31 DIAGNOSIS — J43.2 CENTRILOBULAR EMPHYSEMA (HCC): ICD-10-CM

## 2018-07-31 DIAGNOSIS — J96.11 CHRONIC RESPIRATORY FAILURE WITH HYPOXIA (HCC): ICD-10-CM

## 2018-07-31 PROCEDURE — G8926 SPIRO NO PERF OR DOC: HCPCS | Performed by: INTERNAL MEDICINE

## 2018-07-31 PROCEDURE — G8417 CALC BMI ABV UP PARAM F/U: HCPCS | Performed by: INTERNAL MEDICINE

## 2018-07-31 PROCEDURE — 1036F TOBACCO NON-USER: CPT | Performed by: INTERNAL MEDICINE

## 2018-07-31 PROCEDURE — 1101F PT FALLS ASSESS-DOCD LE1/YR: CPT | Performed by: INTERNAL MEDICINE

## 2018-07-31 PROCEDURE — 99214 OFFICE O/P EST MOD 30 MIN: CPT | Performed by: INTERNAL MEDICINE

## 2018-07-31 PROCEDURE — 3023F SPIROM DOC REV: CPT | Performed by: INTERNAL MEDICINE

## 2018-07-31 PROCEDURE — G8427 DOCREV CUR MEDS BY ELIG CLIN: HCPCS | Performed by: INTERNAL MEDICINE

## 2018-07-31 PROCEDURE — 4040F PNEUMOC VAC/ADMIN/RCVD: CPT | Performed by: INTERNAL MEDICINE

## 2018-07-31 PROCEDURE — G8598 ASA/ANTIPLAT THER USED: HCPCS | Performed by: INTERNAL MEDICINE

## 2018-07-31 PROCEDURE — 1123F ACP DISCUSS/DSCN MKR DOCD: CPT | Performed by: INTERNAL MEDICINE

## 2018-07-31 RX ORDER — PREDNISONE 20 MG/1
20 TABLET ORAL DAILY
Qty: 30 TABLET | Refills: 5 | Status: SHIPPED | OUTPATIENT
Start: 2018-07-31 | End: 2018-11-30

## 2018-07-31 NOTE — PROGRESS NOTES
[facility]  Progress Note  Laurel Varner MD       Mansi Byers   YOB: 1932    Date of Visit:  7/31/2018    Assessment/Plan:  1. COPD, mild ([address]  PFT 1/16:  INTERPRETATION:  1. Spirometry showed increased FVC of 3.23 L, 125% predicted, and normal  FEV-1 of 1.98 L, 101% predicted. The FEV-1/FVC ratio was decreased at 61%. No response to bronchodilators demonstrated on spirometry. 2. Lung volume showed normal total lung capacity of 103% with increased  vital capacity of 125% predicted. 3. Diffusion capacity showed decreased DLCO of 59% predicted.      IMPRESSION: Mild obstructive defect on spirometry with no response to  bronchodilators. Some air trapping was present on lung volumes and moderate  decrease in diffusion capacity. In comparison to the test that was done in  February of 2014, no significant change in forced vital capacity and FEV-1  noted, but total lung capacity was decreased by 15% and diffusion capacity of  carbon monoxide by 10%. Symbicort  Spiriva  HHN QAM and prn  Mucinex    2. Centrilobular emphysema ([address]  CT Chest: 7/17:    FINDINGS:   Pulmonary Arteries: Pulmonary arteries are adequately opacified for   evaluation.  No evidence of intraluminal filling defect to suggest pulmonary   embolism.  Main pulmonary artery is normal in caliber.       Mediastinum: Borderline enlarged lymph nodes within the mediastinum are   unchanged.  No thoracic aortic dissection.       Lungs/pleura: Severe emphysema is noted in the upper lungs.  Interstitial   fibrosis within the lower lungs is re- demonstrated.       Upper Abdomen: Limited images of the upper abdomen are unremarkable.       Soft Tissues/Bones: No acute bone or soft tissue abnormality.            3. Pulmonary fibrosis ([address]  CT Chest: 7/17:    FINDINGS:   Pulmonary Arteries: Pulmonary arteries are adequately opacified for   evaluation.  No evidence of intraluminal filling defect to suggest pulmonary   embolism.  Main pulmonary artery is normal in caliber.       Mediastinum: Borderline enlarged lymph nodes within the mediastinum are   unchanged.  No thoracic aortic dissection.       Lungs/pleura: Severe emphysema is noted in the upper lungs.  Interstitial   fibrosis within the lower lungs is re- demonstrated.       Upper Abdomen: Limited images of the upper abdomen are unremarkable.       Soft Tissues/Bones: No acute bone or soft tissue abnormality. Repeat his CT chest  Put him back on Prednisone 20 mg a day. .     4. Chronic respiratory failure with hypoxia (HCC)  O2 sats are acceptable on supplemental O2. The patient benefits from the use of supplemental O2.    5. Obstructive sleep apnea  Wears BiPAP with O2 nightly and benefits from that      FOLLOW UP: 1 months      Chief Complaint   Patient presents with    6 Month Follow-Up       HPI  The patient present for follow up of mild COPD, bronchiectasis and pulmonary fibrosis. He has chronic hypoxemic respiratory failure. He has LORA and is on BiPAP as well. He was better on Prednisone. He thought that the weather was part of it as well. He is on the O2 all the time. He is better but not back to his baseline. No Chest pain, Nausea or vomiting reported    Review of Systems  As documented in HPI     Physical Exam:  Well developed, well nourished  Alert and oriented  Sclera is clear  No cervical adenopathy  No JVD. Chest examination is clear. Cardiac examination reveals regular rate and rhythm without murmur, gallop or rub. The abdomen is soft, nontender and nondistended. There is no clubbing, cyanosis or edema of the extremities. There is no obvious skin rash. No focal neuro deficicts  Normal mood and affect    Allergies   Allergen Reactions    Aspirin Hives and Swelling    Dilaudid [Hydromorphone Hcl] Other (See Comments)     Severe hallucination     Prior to Visit Medications    Medication Sig Taking?  Authorizing Provider SYMBICORT 160-4.5 MCG/ACT AERO USE 2 PUFFS TWICE DAILY  Natalie Juarez MD   albuterol sulfate HFA (PROAIR HFA) 108 (90 Base) MCG/ACT inhaler Inhale 2 puffs into the lungs daily as needed for Shortness of Breath  Natalie Juarez MD   albuterol (PROVENTIL) (2.5 MG/3ML) 0.083% nebulizer solution Take 3 mLs by nebulization every 6 hours as needed for Wheezing  Natalie Juarez MD   lisinopril (PRINIVIL;ZESTRIL) 5 MG tablet TAKE 1 TABLET BY MOUTH  DAILY  Natalie Juarez MD   metoprolol succinate (TOPROL XL) 50 MG extended release tablet TAKE 1 TABLET BY MOUTH  NIGHTLY  Natalie Juarez MD   clopidogrel (PLAVIX) 75 MG tablet TAKE 1 TABLET BY MOUTH  DAILY  Natalie Juarez MD   allopurinol (ZYLOPRIM) 100 MG tablet TAKE ONE TABLET BY MOUTH DAILY  Natalie Juarez MD   simvastatin (ZOCOR) 40 MG tablet TAKE 1 TABLET BY MOUTH  NIGHTLY  Natalie Juarez MD   glucose blood VI test strips (ONETOUCH VERIO) strip 1 each by In Vitro route daily E11.9  Natalie Juarez MD   SYMBICORT 160-4.5 MCG/ACT AERO INHALE TWO PUFFS BY MOUTH TWICE A DAY  NICOLE Demarco - CNP   tiotropium (Orlene Neetu) 18 MCG inhalation capsule Inhale 1 capsule into the lungs daily  Natalie Juarez MD   [facility] LANCETS 35C MISC Use to check blood sugar once daily. E11.9  Natalie Juarez MD   Selenium 200 MCG CAPS Take  by mouth daily. Historical Provider, MD   Zinc 50 MG TABS Take 50 mg by mouth daily. Historical Provider, MD   Ascorbic Acid (VITAMIN C) 500 MG tablet Take 500 mg by mouth daily. Historical Provider, MD   vitamin D (ERGOCALCIFEROL) 11620 UNIT CAPS capsule Take 2,000 Units by mouth Daily. Historical Provider, MD   Cyanocobalamin (VITAMIN B 12 PO) Take 500 mcg by mouth daily at 1800. Historical Provider, MD       Vitals:    07/31/18 1339   BP: (!) 101/45   Pulse: 83   SpO2: 90%   Weight: 189 lb (85.7 kg)     Body mass index is 31.45 kg/m².      Wt Readings from Last 3 Encounters:   07/31/18 189 lb (85.7 kg)

## 2018-08-03 RX ORDER — TIOTROPIUM BROMIDE 18 UG/1
CAPSULE ORAL; RESPIRATORY (INHALATION)
Qty: 90 CAPSULE | Refills: 3 | Status: SHIPPED | OUTPATIENT
Start: 2018-08-03 | End: 2019-01-16 | Stop reason: SDUPTHER

## 2018-08-20 ENCOUNTER — HOSPITAL ENCOUNTER (OUTPATIENT)
Dept: OTHER | Age: 83
Discharge: HOME | End: 2018-08-31
Attending: INTERNAL MEDICINE | Admitting: INTERNAL MEDICINE

## 2018-08-21 ENCOUNTER — HOSPITAL ENCOUNTER (OUTPATIENT)
Dept: CT IMAGING | Age: 83
Discharge: HOME | End: 2018-08-21
Attending: INTERNAL MEDICINE | Admitting: INTERNAL MEDICINE

## 2018-08-21 DIAGNOSIS — J84.10 PULMONARY FIBROSIS (HCC): ICD-10-CM

## 2018-09-01 ENCOUNTER — HOSPITAL ENCOUNTER (OUTPATIENT)
Dept: OTHER | Age: 83
Discharge: HOME | End: 2018-09-01
Attending: INTERNAL MEDICINE | Admitting: INTERNAL MEDICINE

## 2018-09-07 ENCOUNTER — OFFICE VISIT (OUTPATIENT)
Dept: PULMONOLOGY | Age: 83
End: 2018-09-07

## 2018-09-07 VITALS
BODY MASS INDEX: 30.79 KG/M2 | DIASTOLIC BLOOD PRESSURE: 54 MMHG | SYSTOLIC BLOOD PRESSURE: 95 MMHG | HEART RATE: 70 BPM | OXYGEN SATURATION: 92 % | WEIGHT: 185 LBS

## 2018-09-07 DIAGNOSIS — R91.1 PULMONARY NODULE: ICD-10-CM

## 2018-09-07 DIAGNOSIS — J84.10 PULMONARY FIBROSIS (HCC): ICD-10-CM

## 2018-09-07 DIAGNOSIS — J96.11 CHRONIC RESPIRATORY FAILURE WITH HYPOXIA (HCC): ICD-10-CM

## 2018-09-07 DIAGNOSIS — J44.9 COPD, MILD (HCC): Primary | ICD-10-CM

## 2018-09-07 DIAGNOSIS — J43.2 CENTRILOBULAR EMPHYSEMA (HCC): ICD-10-CM

## 2018-09-07 PROCEDURE — 1101F PT FALLS ASSESS-DOCD LE1/YR: CPT | Performed by: INTERNAL MEDICINE

## 2018-09-07 PROCEDURE — 1036F TOBACCO NON-USER: CPT | Performed by: INTERNAL MEDICINE

## 2018-09-07 PROCEDURE — G8926 SPIRO NO PERF OR DOC: HCPCS | Performed by: INTERNAL MEDICINE

## 2018-09-07 PROCEDURE — G8417 CALC BMI ABV UP PARAM F/U: HCPCS | Performed by: INTERNAL MEDICINE

## 2018-09-07 PROCEDURE — 99214 OFFICE O/P EST MOD 30 MIN: CPT | Performed by: INTERNAL MEDICINE

## 2018-09-07 PROCEDURE — 1123F ACP DISCUSS/DSCN MKR DOCD: CPT | Performed by: INTERNAL MEDICINE

## 2018-09-07 PROCEDURE — G8598 ASA/ANTIPLAT THER USED: HCPCS | Performed by: INTERNAL MEDICINE

## 2018-09-07 PROCEDURE — G8427 DOCREV CUR MEDS BY ELIG CLIN: HCPCS | Performed by: INTERNAL MEDICINE

## 2018-09-07 PROCEDURE — 3023F SPIROM DOC REV: CPT | Performed by: INTERNAL MEDICINE

## 2018-09-07 PROCEDURE — 4040F PNEUMOC VAC/ADMIN/RCVD: CPT | Performed by: INTERNAL MEDICINE

## 2018-09-07 RX ORDER — PREDNISONE 10 MG/1
10 TABLET ORAL DAILY
Qty: 30 TABLET | Refills: 11 | Status: SHIPPED | OUTPATIENT
Start: 2018-09-07 | End: 2018-09-17

## 2018-09-07 NOTE — PROGRESS NOTES
[facility]  Progress Note  MD Elmer Noguera   YOB: 1932    Date of Visit:  9/7/2018    Assessment/Plan:  1. COPD, mild ([address]  PFT 2/18:  INTERPRETATION:  Spirometry attempts were acceptable and reproducible. FVC  was normal at 3.48 liters, 119% predicted and normal FEV1 of 2.02 liters,  101% predicted. FEV1/FVC ratio was decreased at 58%. Lung volumes showed  normal total lung capacity of 112% predicted. Diffusion capacity showed  decreased DLCO of 48% predicted.     IMPRESSION:  Mild obstructive defect on spirometry with normal lung volumes  but moderate-to-severe decrease in diffusion capacity. In comparison to  the test that was done in 01/2016, no significant change in FVC or FEV1. Total lung capacity has improved by 9% and DLCO decreased by 10%. Symbicort  Spiriva  HHN QAM and prn  Mucinex    2. Centrilobular emphysema (HCC)  CT Chest:8/18    Impression   No significant change in fibrosis which is inconsistent with UIP pattern due   to extensive ground-glass component.  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       Slight interval increase in size of the 8 mm nodule in the left lower lobe. This has been present for greater than 2 years, though consider continued   follow-up imaging given slight increase.       Unchanged severe emphysema. 3. Pulmonary fibrosis ([address]  CT Chest: 8/18:  Impression   No significant change in fibrosis which is inconsistent with UIP pattern due   to extensive ground-glass component.  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       Slight interval increase in size of the 8 mm nodule in the left lower lobe. This has been present for greater than 2 years, though consider continued   follow-up imaging given slight increase.       Unchanged severe emphysema. I have independently reviewed radiographic images for this patient as part of this visit.   Prednisone 20 mg per day ==> 10 mg per day x 14 days and Tanya Aguilera MD   albuterol sulfate HFA (PROAIR HFA) 108 (90 Base) MCG/ACT inhaler Inhale 2 puffs into the lungs daily as needed for Shortness of Breath  Yoav Cooney MD   albuterol (PROVENTIL) (2.5 MG/3ML) 0.083% nebulizer solution Take 3 mLs by nebulization every 6 hours as needed for Wheezing  Yoav Cooney MD   lisinopril (PRINIVIL;ZESTRIL) 5 MG tablet TAKE 1 TABLET BY MOUTH  DAILY  Yoav Cooney MD   metoprolol succinate (TOPROL XL) 50 MG extended release tablet TAKE 1 TABLET BY MOUTH  NIGHTLY  Yoav Cooney MD   clopidogrel (PLAVIX) 75 MG tablet TAKE 1 TABLET BY MOUTH  DAILY  Yoav Cooney MD   allopurinol (ZYLOPRIM) 100 MG tablet TAKE ONE TABLET BY MOUTH DAILY  Yoav Cooney MD   simvastatin (ZOCOR) 40 MG tablet TAKE 1 TABLET BY MOUTH  NIGHTLY  Yoav Cooney MD   glucose blood VI test strips (ONETOUCH VERIO) strip 1 each by In Vitro route daily E11.9  Yoav Cooney MD   SYMBICORT 160-4.5 MCG/ACT AERO INHALE TWO PUFFS BY MOUTH TWICE A DAY  NICOLE Gregorio - CNP   ONETOUCH DELICA LANCETS 13I MISC Use to check blood sugar once daily. E11.9  Yoav Cooney MD   Selenium 200 MCG CAPS Take  by mouth daily. Historical Provider, MD   Zinc 50 MG TABS Take 50 mg by mouth daily. Historical Provider, MD   Ascorbic Acid (VITAMIN C) 500 MG tablet Take 500 mg by mouth daily. Historical Provider, MD   vitamin D (ERGOCALCIFEROL) 52955 UNIT CAPS capsule Take 2,000 Units by mouth Daily. Historical Provider, MD   Cyanocobalamin (VITAMIN B 12 PO) Take 500 mcg by mouth daily at 1800. Historical Provider, MD       Vitals:    09/07/18 1154   BP: (!) 95/54   Pulse: 70   SpO2: 92%   Weight: 185 lb (83.9 kg)     Body mass index is 30.79 kg/m².      Wt Readings from Last 3 Encounters:   09/07/18 185 lb (83.9 kg)   07/31/18 189 lb (85.7 kg)   06/25/18 189 lb (85.7 kg)     BP Readings from Last 3 Encounters:   09/07/18 (!) 95/54   07/31/18 (!) 101/45   06/25/18 (!) 121/59

## 2018-10-01 ENCOUNTER — TELEPHONE (OUTPATIENT)
Dept: PULMONOLOGY | Age: 83
End: 2018-10-01

## 2018-11-16 RX ORDER — LISINOPRIL 5 MG/1
5 TABLET ORAL DAILY
Qty: 90 TABLET | Refills: 0 | Status: SHIPPED | OUTPATIENT
Start: 2018-11-16 | End: 2019-01-16 | Stop reason: SDUPTHER

## 2018-11-30 ENCOUNTER — OFFICE VISIT (OUTPATIENT)
Dept: FAMILY MEDICINE CLINIC | Age: 83
End: 2018-11-30
Payer: MEDICARE

## 2018-11-30 VITALS
DIASTOLIC BLOOD PRESSURE: 64 MMHG | OXYGEN SATURATION: 90 % | HEART RATE: 71 BPM | BODY MASS INDEX: 31.78 KG/M2 | WEIGHT: 191 LBS | SYSTOLIC BLOOD PRESSURE: 116 MMHG

## 2018-11-30 DIAGNOSIS — E11.9 TYPE 2 DIABETES MELLITUS WITHOUT COMPLICATION, WITHOUT LONG-TERM CURRENT USE OF INSULIN (HCC): Primary | ICD-10-CM

## 2018-11-30 DIAGNOSIS — R00.2 PALPITATIONS: ICD-10-CM

## 2018-11-30 DIAGNOSIS — E78.00 PURE HYPERCHOLESTEROLEMIA: ICD-10-CM

## 2018-11-30 DIAGNOSIS — I10 ESSENTIAL HYPERTENSION: ICD-10-CM

## 2018-11-30 DIAGNOSIS — Z79.52 LONG TERM SYSTEMIC STEROID USER: ICD-10-CM

## 2018-11-30 PROCEDURE — 99214 OFFICE O/P EST MOD 30 MIN: CPT | Performed by: FAMILY MEDICINE

## 2018-11-30 PROCEDURE — 1101F PT FALLS ASSESS-DOCD LE1/YR: CPT | Performed by: FAMILY MEDICINE

## 2018-11-30 PROCEDURE — 4040F PNEUMOC VAC/ADMIN/RCVD: CPT | Performed by: FAMILY MEDICINE

## 2018-11-30 PROCEDURE — 1123F ACP DISCUSS/DSCN MKR DOCD: CPT | Performed by: FAMILY MEDICINE

## 2018-11-30 PROCEDURE — G8417 CALC BMI ABV UP PARAM F/U: HCPCS | Performed by: FAMILY MEDICINE

## 2018-11-30 PROCEDURE — G8598 ASA/ANTIPLAT THER USED: HCPCS | Performed by: FAMILY MEDICINE

## 2018-11-30 PROCEDURE — G8484 FLU IMMUNIZE NO ADMIN: HCPCS | Performed by: FAMILY MEDICINE

## 2018-11-30 PROCEDURE — G8427 DOCREV CUR MEDS BY ELIG CLIN: HCPCS | Performed by: FAMILY MEDICINE

## 2018-11-30 PROCEDURE — 1036F TOBACCO NON-USER: CPT | Performed by: FAMILY MEDICINE

## 2018-11-30 ASSESSMENT — PATIENT HEALTH QUESTIONNAIRE - PHQ9
2. FEELING DOWN, DEPRESSED OR HOPELESS: 0
SUM OF ALL RESPONSES TO PHQ QUESTIONS 1-9: 0
SUM OF ALL RESPONSES TO PHQ9 QUESTIONS 1 & 2: 0
1. LITTLE INTEREST OR PLEASURE IN DOING THINGS: 0
SUM OF ALL RESPONSES TO PHQ QUESTIONS 1-9: 0

## 2018-12-10 ENCOUNTER — OFFICE VISIT (OUTPATIENT)
Dept: PULMONOLOGY | Age: 83
End: 2018-12-10
Payer: MEDICARE

## 2018-12-10 VITALS
SYSTOLIC BLOOD PRESSURE: 115 MMHG | OXYGEN SATURATION: 92 % | DIASTOLIC BLOOD PRESSURE: 63 MMHG | HEART RATE: 88 BPM | BODY MASS INDEX: 31.62 KG/M2 | WEIGHT: 190 LBS

## 2018-12-10 DIAGNOSIS — R91.1 PULMONARY NODULE: ICD-10-CM

## 2018-12-10 DIAGNOSIS — J43.2 CENTRILOBULAR EMPHYSEMA (HCC): ICD-10-CM

## 2018-12-10 DIAGNOSIS — J44.9 COPD, MILD (HCC): Primary | ICD-10-CM

## 2018-12-10 DIAGNOSIS — J84.10 PULMONARY FIBROSIS (HCC): ICD-10-CM

## 2018-12-10 DIAGNOSIS — G47.33 OBSTRUCTIVE SLEEP APNEA: ICD-10-CM

## 2018-12-10 DIAGNOSIS — J96.11 CHRONIC RESPIRATORY FAILURE WITH HYPOXIA (HCC): ICD-10-CM

## 2018-12-10 PROCEDURE — G8484 FLU IMMUNIZE NO ADMIN: HCPCS | Performed by: INTERNAL MEDICINE

## 2018-12-10 PROCEDURE — 1101F PT FALLS ASSESS-DOCD LE1/YR: CPT | Performed by: INTERNAL MEDICINE

## 2018-12-10 PROCEDURE — G8417 CALC BMI ABV UP PARAM F/U: HCPCS | Performed by: INTERNAL MEDICINE

## 2018-12-10 PROCEDURE — 1123F ACP DISCUSS/DSCN MKR DOCD: CPT | Performed by: INTERNAL MEDICINE

## 2018-12-10 PROCEDURE — G8926 SPIRO NO PERF OR DOC: HCPCS | Performed by: INTERNAL MEDICINE

## 2018-12-10 PROCEDURE — 3023F SPIROM DOC REV: CPT | Performed by: INTERNAL MEDICINE

## 2018-12-10 PROCEDURE — G8427 DOCREV CUR MEDS BY ELIG CLIN: HCPCS | Performed by: INTERNAL MEDICINE

## 2018-12-10 PROCEDURE — 99214 OFFICE O/P EST MOD 30 MIN: CPT | Performed by: INTERNAL MEDICINE

## 2018-12-10 PROCEDURE — 1036F TOBACCO NON-USER: CPT | Performed by: INTERNAL MEDICINE

## 2018-12-10 PROCEDURE — G8598 ASA/ANTIPLAT THER USED: HCPCS | Performed by: INTERNAL MEDICINE

## 2018-12-10 PROCEDURE — 4040F PNEUMOC VAC/ADMIN/RCVD: CPT | Performed by: INTERNAL MEDICINE

## 2018-12-10 RX ORDER — PREDNISONE 10 MG/1
10 TABLET ORAL DAILY
COMMUNITY
End: 2019-10-16 | Stop reason: SDUPTHER

## 2019-01-16 RX ORDER — SIMVASTATIN 40 MG
40 TABLET ORAL NIGHTLY
Qty: 90 TABLET | Refills: 3 | Status: SHIPPED | OUTPATIENT
Start: 2019-01-16 | End: 2019-08-16

## 2019-01-16 RX ORDER — LISINOPRIL 5 MG/1
5 TABLET ORAL DAILY
Qty: 90 TABLET | Refills: 3 | Status: SHIPPED | OUTPATIENT
Start: 2019-01-16 | End: 2020-01-14 | Stop reason: SDUPTHER

## 2019-01-16 RX ORDER — BUDESONIDE AND FORMOTEROL FUMARATE DIHYDRATE 160; 4.5 UG/1; UG/1
2 AEROSOL RESPIRATORY (INHALATION) 2 TIMES DAILY
Qty: 3 INHALER | Refills: 1 | Status: SHIPPED | OUTPATIENT
Start: 2019-01-16 | End: 2019-07-01 | Stop reason: SDUPTHER

## 2019-01-16 RX ORDER — ALLOPURINOL 100 MG/1
TABLET ORAL
Qty: 90 TABLET | Refills: 3 | Status: SHIPPED | OUTPATIENT
Start: 2019-01-16 | End: 2020-01-14 | Stop reason: SDUPTHER

## 2019-01-16 RX ORDER — ALBUTEROL SULFATE 90 UG/1
2 AEROSOL, METERED RESPIRATORY (INHALATION) DAILY PRN
Qty: 3 INHALER | Refills: 3 | Status: SHIPPED | OUTPATIENT
Start: 2019-01-16 | End: 2020-01-14 | Stop reason: SDUPTHER

## 2019-01-16 RX ORDER — METOPROLOL SUCCINATE 50 MG/1
50 TABLET, EXTENDED RELEASE ORAL NIGHTLY
Qty: 90 TABLET | Refills: 3 | Status: SHIPPED | OUTPATIENT
Start: 2019-01-16 | End: 2019-05-22 | Stop reason: DRUGHIGH

## 2019-01-16 RX ORDER — CLOPIDOGREL BISULFATE 75 MG/1
75 TABLET ORAL DAILY
Qty: 90 TABLET | Refills: 3 | Status: SHIPPED | OUTPATIENT
Start: 2019-01-16 | End: 2020-01-14 | Stop reason: SDUPTHER

## 2019-04-10 ENCOUNTER — OFFICE VISIT (OUTPATIENT)
Dept: PULMONOLOGY | Age: 84
End: 2019-04-10
Payer: MEDICARE

## 2019-04-10 VITALS
OXYGEN SATURATION: 92 % | HEART RATE: 78 BPM | SYSTOLIC BLOOD PRESSURE: 109 MMHG | DIASTOLIC BLOOD PRESSURE: 51 MMHG | BODY MASS INDEX: 32.12 KG/M2 | WEIGHT: 193 LBS

## 2019-04-10 DIAGNOSIS — J43.2 CENTRILOBULAR EMPHYSEMA (HCC): ICD-10-CM

## 2019-04-10 DIAGNOSIS — G47.33 OBSTRUCTIVE SLEEP APNEA: ICD-10-CM

## 2019-04-10 DIAGNOSIS — R91.1 PULMONARY NODULE: ICD-10-CM

## 2019-04-10 DIAGNOSIS — J84.10 PULMONARY FIBROSIS (HCC): ICD-10-CM

## 2019-04-10 DIAGNOSIS — J96.11 CHRONIC RESPIRATORY FAILURE WITH HYPOXIA (HCC): ICD-10-CM

## 2019-04-10 DIAGNOSIS — J44.9 COPD, MILD (HCC): Primary | ICD-10-CM

## 2019-04-10 PROCEDURE — G8598 ASA/ANTIPLAT THER USED: HCPCS | Performed by: INTERNAL MEDICINE

## 2019-04-10 PROCEDURE — G8417 CALC BMI ABV UP PARAM F/U: HCPCS | Performed by: INTERNAL MEDICINE

## 2019-04-10 PROCEDURE — 1123F ACP DISCUSS/DSCN MKR DOCD: CPT | Performed by: INTERNAL MEDICINE

## 2019-04-10 PROCEDURE — 4040F PNEUMOC VAC/ADMIN/RCVD: CPT | Performed by: INTERNAL MEDICINE

## 2019-04-10 PROCEDURE — 1036F TOBACCO NON-USER: CPT | Performed by: INTERNAL MEDICINE

## 2019-04-10 PROCEDURE — 99214 OFFICE O/P EST MOD 30 MIN: CPT | Performed by: INTERNAL MEDICINE

## 2019-04-10 PROCEDURE — G8926 SPIRO NO PERF OR DOC: HCPCS | Performed by: INTERNAL MEDICINE

## 2019-04-10 PROCEDURE — G8427 DOCREV CUR MEDS BY ELIG CLIN: HCPCS | Performed by: INTERNAL MEDICINE

## 2019-04-10 PROCEDURE — 3023F SPIROM DOC REV: CPT | Performed by: INTERNAL MEDICINE

## 2019-04-12 ENCOUNTER — TELEPHONE (OUTPATIENT)
Dept: PULMONOLOGY | Age: 84
End: 2019-04-12

## 2019-04-12 NOTE — TELEPHONE ENCOUNTER
Pt's wife called in stating that Pt now has a productive cough, yellow in color. Jasmine Logan wondering if they can get an antibiotic called in. Jasmine Logan stated that Pt is currently on 10mg of Prednisone, not sure if Lauren Chavez wants to up that as well or not.      Jasmine Logan # [phone]    Pt uses [facility] on [address], [phone]

## 2019-04-19 ENCOUNTER — HOSPITAL ENCOUNTER (OUTPATIENT)
Dept: GENERAL RADIOLOGY | Age: 84
Discharge: HOME | End: 2019-04-19
Payer: MEDICARE

## 2019-04-19 ENCOUNTER — HOSPITAL ENCOUNTER (OUTPATIENT)
Dept: CT IMAGING | Age: 84
Discharge: HOME | End: 2019-04-19
Payer: MEDICARE

## 2019-04-19 DIAGNOSIS — Z79.52 LONG TERM SYSTEMIC STEROID USER: ICD-10-CM

## 2019-04-19 DIAGNOSIS — R91.1 PULMONARY NODULE: ICD-10-CM

## 2019-04-19 DIAGNOSIS — J84.10 PULMONARY FIBROSIS (HCC): ICD-10-CM

## 2019-04-19 PROCEDURE — 71250 CT THORAX DX C-: CPT

## 2019-04-19 PROCEDURE — 77080 DXA BONE DENSITY AXIAL: CPT

## 2019-04-24 ENCOUNTER — TELEPHONE (OUTPATIENT)
Dept: PULMONOLOGY | Age: 84
End: 2019-04-24

## 2019-04-24 NOTE — TELEPHONE ENCOUNTER
Called and went over CT results with Mrs vIet Herbert. She would like to know if something needs to be done about the calcification around pt's heart. She stated that this was not present on last imaging.  Told her I would ask Dr Amelia Grayson and call them back she stated that would be fine

## 2019-04-25 RX ORDER — ALENDRONATE SODIUM 70 MG/1
70 TABLET ORAL
Qty: 12 TABLET | Refills: 3 | Status: SHIPPED | OUTPATIENT
Start: 2019-04-25 | End: 2020-01-14 | Stop reason: SDUPTHER

## 2019-04-25 NOTE — TELEPHONE ENCOUNTER
----- Message from Guera Benjamin MD sent at 4/24/2019  6:31 PM EDT -----  Dexa shows osteopenia    Frax score is 4.1 % hip/ 9.6 % total (Treatment recommended at 3% hip or 20% total)    Based on Frax score (A tool that estimates the 10 year risk of having an osteoporotic fracture) would recommend drug treatment at this time. Rec starting alendronate 70mg weekly, take on empty stomach with full glass of water and remain upright for 30min after. Call if any side effect of heart burn or upset stomach. Make sure getting Vitamin D 2000 units daily and calcium 1000-1200mg daily. It is also important for regular weight bearing exercise (everything except swimming and yoga)  Recheck Dexa in 2 years.

## 2019-05-14 LAB
BUN / CREAT RATIO: ABNORMAL (CALC) (ref 6–22)
BUN BLDV-MCNC: 19 MG/DL (ref 7–25)
CALCIUM SERPL-MCNC: 8.8 MG/DL (ref 8.6–10.3)
CHLORIDE BLD-SCNC: 104 MMOL/L (ref 98–110)
CO2: 30 MMOL/L (ref 20–32)
CREAT SERPL-MCNC: 1.02 MG/DL (ref 0.7–1.11)
GFR AFRICAN AMERICAN: 77 ML/MIN/1.73M2
GFR, ESTIMATED: 66 ML/MIN/1.73M2
GLUCOSE BLD-MCNC: 116 MG/DL (ref 65–99)
HBA1C MFR BLD: 6.8 % OF TOTAL HGB
POTASSIUM SERPL-SCNC: 3.9 MMOL/L (ref 3.5–5.3)
SODIUM BLD-SCNC: 141 MMOL/L (ref 135–146)

## 2019-05-22 ENCOUNTER — OFFICE VISIT (OUTPATIENT)
Dept: FAMILY MEDICINE CLINIC | Age: 84
End: 2019-05-22
Payer: MEDICARE

## 2019-05-22 VITALS
HEART RATE: 86 BPM | HEIGHT: 65 IN | WEIGHT: 188 LBS | DIASTOLIC BLOOD PRESSURE: 58 MMHG | BODY MASS INDEX: 31.32 KG/M2 | OXYGEN SATURATION: 92 % | SYSTOLIC BLOOD PRESSURE: 118 MMHG

## 2019-05-22 DIAGNOSIS — E11.9 TYPE 2 DIABETES MELLITUS WITHOUT COMPLICATION, WITHOUT LONG-TERM CURRENT USE OF INSULIN (HCC): Primary | ICD-10-CM

## 2019-05-22 DIAGNOSIS — Z51.5 ENCOUNTER FOR END OF LIFE CARE: ICD-10-CM

## 2019-05-22 DIAGNOSIS — I10 ESSENTIAL HYPERTENSION: ICD-10-CM

## 2019-05-22 PROCEDURE — 99214 OFFICE O/P EST MOD 30 MIN: CPT | Performed by: FAMILY MEDICINE

## 2019-05-22 PROCEDURE — 4040F PNEUMOC VAC/ADMIN/RCVD: CPT | Performed by: FAMILY MEDICINE

## 2019-05-22 PROCEDURE — 1036F TOBACCO NON-USER: CPT | Performed by: FAMILY MEDICINE

## 2019-05-22 PROCEDURE — 1123F ACP DISCUSS/DSCN MKR DOCD: CPT | Performed by: FAMILY MEDICINE

## 2019-05-22 PROCEDURE — G8598 ASA/ANTIPLAT THER USED: HCPCS | Performed by: FAMILY MEDICINE

## 2019-05-22 PROCEDURE — G8427 DOCREV CUR MEDS BY ELIG CLIN: HCPCS | Performed by: FAMILY MEDICINE

## 2019-05-22 PROCEDURE — G8417 CALC BMI ABV UP PARAM F/U: HCPCS | Performed by: FAMILY MEDICINE

## 2019-05-22 RX ORDER — METOPROLOL SUCCINATE 50 MG/1
25 TABLET, EXTENDED RELEASE ORAL NIGHTLY
Qty: 90 TABLET | Refills: 3 | Status: SHIPPED
Start: 2019-05-22 | End: 2019-08-30

## 2019-05-22 RX ORDER — PHENOL 1.4 %
1 AEROSOL, SPRAY (ML) MUCOUS MEMBRANE DAILY
COMMUNITY

## 2019-05-22 NOTE — PROGRESS NOTES
Ty Paz is a 80 y.o. male who presents for follow-up of Type 2 diabetes mellitus. Current medication use: not currently on medications for this problem  Eye exam current (within one year): yes  Current diet: depends on the day, not as healthy on golf and bowling days and  and . Golfs 9 holes. Current exercise: Rides stationary bike 30 minutes and lifts hand weights, 3x's a week    Checks sugars at home: Yes  Hypoglycemia symptoms: No  AM Fastin    Checks BP at home: yes, has been getting below 60 more, high 50's  Current medication use: lisinopril, metoprolol,  taking as prescribed, no SE    Patient has been bruising more. Went to dermatologist, Dr. Juarez Later, and has basal cell on right cheek. Breathing is getting worse. Will be seeing Dr. Keara Solis in another 3 months. Oxygen drops to 74% if he throws 2 bowling balls in a row. Oxygen drops when walking but once he rest, oxygen does go up to low 90's. Is experiencing a tingling in right shoulder. Started around a week ago. Does not hurt and does not change his breathing. Has no weakness in arm. Feet are blue when he gets up in the morning. Patient has a living will. They really have not discussed if he wants to be intubated and put on a ventilator or if he has a heart attack if he wants CPR and shocked. No CP, no palpitations, no edema, no change in bowel or bladder,  no nausea, no vomiting, no abdominal pain,     Patient's medications, allergies, past medical, surgical, social and family histories were reviewed and updated in the EHR as appropriate. Body mass index is 31.05 kg/m².   Vitals:    19 1333   BP: (!) 118/58   Site: Left Upper Arm   Position: Sitting   Cuff Size: Medium Adult   Pulse: 86   SpO2: 92%   Weight: 188 lb (85.3 kg)   Height: 5' 5.25\" (1.657 m)     Wt Readings from Last 3 Encounters:   19 188 lb (85.3 kg)   04/10/19 193 lb (87.5 kg)   12/10/18 190 lb (86.2 kg)

## 2019-05-22 NOTE — PATIENT INSTRUCTIONS
Patient Education      Start taking 25mg of metoprolol, break 50mg tablet in half. Learning About Diabetes Food Guidelines  Your Care Instructions    Meal planning is important to manage diabetes. It helps keep your blood sugar at a target level (which you set with your doctor). You don't have to eat special foods. You can eat what your family eats, including sweets once in a while. But you do have to pay attention to how often you eat and how much you eat of certain foods. You may want to work with a dietitian or a certified diabetes educator (CDE) to help you plan meals and snacks. A dietitian or CDE can also help you lose weight if that is one of your goals. What should you know about eating carbs? Managing the amount of carbohydrate (carbs) you eat is an important part of healthy meals when you have diabetes. Carbohydrate is found in many foods. · Learn which foods have carbs. And learn the amounts of carbs in different foods. ? Bread, cereal, pasta, and rice have about 15 grams of carbs in a serving. A serving is 1 slice of bread (1 ounce), ½ cup of cooked cereal, or 1/3 cup of cooked pasta or rice. ? Fruits have 15 grams of carbs in a serving. A serving is 1 small fresh fruit, such as an apple or orange; ½ of a banana; ½ cup of cooked or canned fruit; ½ cup of fruit juice; 1 cup of melon or raspberries; or 2 tablespoons of dried fruit. ? Milk and no-sugar-added yogurt have 15 grams of carbs in a serving. A serving is 1 cup of milk or 2/3 cup of no-sugar-added yogurt. ? Starchy vegetables have 15 grams of carbs in a serving. A serving is ½ cup of mashed potatoes or sweet potato; 1 cup winter squash; ½ of a small baked potato; ½ cup of cooked beans; or ½ cup cooked corn or green peas. · Learn how much carbs to eat each day and at each meal. A dietitian or CDE can teach you how to keep track of the amount of carbs you eat. This is called carbohydrate counting.   · If you are not sure how to count carbohydrate grams, use the Plate Method to plan meals. It is a good, quick way to make sure that you have a balanced meal. It also helps you spread carbs throughout the day. ? Divide your plate by types of foods. Put non-starchy vegetables on half the plate, meat or other protein food on one-quarter of the plate, and a grain or starchy vegetable in the final quarter of the plate. To this you can add a small piece of fruit and 1 cup of milk or yogurt, depending on how many carbs you are supposed to eat at a meal.  · Try to eat about the same amount of carbs at each meal. Do not \"save up\" your daily allowance of carbs to eat at one meal.  · Proteins have very little or no carbs per serving. Examples of proteins are beef, chicken, turkey, fish, eggs, tofu, cheese, cottage cheese, and peanut butter. A serving size of meat is 3 ounces, which is about the size of a deck of cards. Examples of meat substitute serving sizes (equal to 1 ounce of meat) are 1/4 cup of cottage cheese, 1 egg, 1 tablespoon of peanut butter, and ½ cup of tofu. How can you eat out and still eat healthy? · Learn to estimate the serving sizes of foods that have carbohydrate. If you measure food at home, it will be easier to estimate the amount in a serving of restaurant food. · If the meal you order has too much carbohydrate (such as potatoes, corn, or baked beans), ask to have a low-carbohydrate food instead. Ask for a salad or green vegetables. · If you use insulin, check your blood sugar before and after eating out to help you plan how much to eat in the future. · If you eat more carbohydrate at a meal than you had planned, take a walk or do other exercise. This will help lower your blood sugar. What else should you know? · Limit saturated fat, such as the fat from meat and dairy products. This is a healthy choice because people who have diabetes are at higher risk of heart disease.  So choose lean cuts of meat and nonfat or low-fat dairy products. Use olive or canola oil instead of butter or shortening when cooking. · Don't skip meals. Your blood sugar may drop too low if you skip meals and take insulin or certain medicines for diabetes. · Check with your doctor before you drink alcohol. Alcohol can cause your blood sugar to drop too low. Alcohol can also cause a bad reaction if you take certain diabetes medicines. Follow-up care is a key part of your treatment and safety. Be sure to make and go to all appointments, and call your doctor if you are having problems. It's also a good idea to know your test results and keep a list of the medicines you take. Where can you learn more? Go to https://TrademarkFlypeInVivioLink.[employer]the[employer]. org and sign in to your iPeen account. Enter V739 in the [employer] box to learn more about \"Learning About Diabetes Food Guidelines. \"     If you do not have an account, please click on the \"Sign Up Now\" link. Current as of: July 25, 2018  Content Version: 12.0  © 2931-4351 [employer], [employer]. Care instructions adapted under license by [facility] ([facility]). If you have questions about a medical condition or this instruction, always ask your healthcare professional. [place] [address] any warranty or liability for your use of this information.

## 2019-06-20 DIAGNOSIS — E11.9 TYPE 2 DIABETES MELLITUS WITHOUT COMPLICATION, WITHOUT LONG-TERM CURRENT USE OF INSULIN (HCC): Primary | ICD-10-CM

## 2019-06-20 RX ORDER — BLOOD SUGAR DIAGNOSTIC
STRIP MISCELLANEOUS
Qty: 50 STRIP | Refills: 11 | Status: SHIPPED | OUTPATIENT
Start: 2019-06-20 | End: 2021-01-01 | Stop reason: SDUPTHER

## 2019-07-01 RX ORDER — BUDESONIDE AND FORMOTEROL FUMARATE DIHYDRATE 160; 4.5 UG/1; UG/1
AEROSOL RESPIRATORY (INHALATION)
Qty: 3 INHALER | Refills: 1 | Status: SHIPPED | OUTPATIENT
Start: 2019-07-01 | End: 2020-01-14 | Stop reason: SDUPTHER

## 2019-07-29 ENCOUNTER — TELEPHONE (OUTPATIENT)
Dept: PULMONOLOGY | Age: 84
End: 2019-07-29

## 2019-07-29 RX ORDER — ALBUTEROL SULFATE 2.5 MG/3ML
2.5 SOLUTION RESPIRATORY (INHALATION) EVERY 6 HOURS PRN
Qty: 360 EACH | Refills: 3 | Status: SHIPPED | OUTPATIENT
Start: 2019-07-29 | End: 2019-07-30 | Stop reason: SDUPTHER

## 2019-07-30 ENCOUNTER — OFFICE VISIT (OUTPATIENT)
Dept: PULMONOLOGY | Age: 84
End: 2019-07-30
Payer: MEDICARE

## 2019-07-30 VITALS
OXYGEN SATURATION: 88 % | HEART RATE: 84 BPM | BODY MASS INDEX: 30.72 KG/M2 | WEIGHT: 186 LBS | DIASTOLIC BLOOD PRESSURE: 54 MMHG | SYSTOLIC BLOOD PRESSURE: 109 MMHG

## 2019-07-30 DIAGNOSIS — I47.1 SVT (SUPRAVENTRICULAR TACHYCARDIA) (HCC): Primary | ICD-10-CM

## 2019-07-30 PROCEDURE — 1036F TOBACCO NON-USER: CPT | Performed by: INTERNAL MEDICINE

## 2019-07-30 PROCEDURE — 4040F PNEUMOC VAC/ADMIN/RCVD: CPT | Performed by: INTERNAL MEDICINE

## 2019-07-30 PROCEDURE — G8598 ASA/ANTIPLAT THER USED: HCPCS | Performed by: INTERNAL MEDICINE

## 2019-07-30 PROCEDURE — 99214 OFFICE O/P EST MOD 30 MIN: CPT | Performed by: INTERNAL MEDICINE

## 2019-07-30 PROCEDURE — 1123F ACP DISCUSS/DSCN MKR DOCD: CPT | Performed by: INTERNAL MEDICINE

## 2019-07-30 PROCEDURE — G8427 DOCREV CUR MEDS BY ELIG CLIN: HCPCS | Performed by: INTERNAL MEDICINE

## 2019-07-30 PROCEDURE — G8417 CALC BMI ABV UP PARAM F/U: HCPCS | Performed by: INTERNAL MEDICINE

## 2019-07-30 RX ORDER — DOXYCYCLINE HYCLATE 100 MG/1
100 CAPSULE ORAL DAILY
Qty: 30 CAPSULE | Refills: 1 | Status: SHIPPED | OUTPATIENT
Start: 2019-07-30 | End: 2019-08-29

## 2019-07-30 RX ORDER — ALBUTEROL SULFATE 2.5 MG/3ML
2.5 SOLUTION RESPIRATORY (INHALATION) EVERY 6 HOURS PRN
Qty: 360 EACH | Refills: 3 | Status: SHIPPED | OUTPATIENT
Start: 2019-07-30 | End: 2020-01-14 | Stop reason: SDUPTHER

## 2019-07-30 RX ORDER — PREDNISONE 20 MG/1
20 TABLET ORAL DAILY
Qty: 10 TABLET | Refills: 3 | Status: SHIPPED | OUTPATIENT
Start: 2019-07-30 | End: 2019-08-29

## 2019-07-30 NOTE — PROGRESS NOTES
[facility]  Progress Note  Roselyn Lea MD       April Andreas   YOB: 1932    Date of Visit:  7/30/2019    Assessment/Plan:  1. COPD, mild ([address]  PFT 2/18:  INTERPRETATION:  Spirometry attempts were acceptable and reproducible. FVC  was normal at 3.48 liters, 119% predicted and normal FEV1 of 2.02 liters,  101% predicted. FEV1/FVC ratio was decreased at 58%. Lung volumes showed  normal total lung capacity of 112% predicted. Diffusion capacity showed  decreased DLCO of 48% predicted.     IMPRESSION:  Mild obstructive defect on spirometry with normal lung volumes  but moderate-to-severe decrease in diffusion capacity. In comparison to  the test that was done in 01/2016, no significant change in FVC or FEV1. Total lung capacity has improved by 9% and DLCO decreased by 10%. Symbicort  Spiriva  HHN QAM and prn  Albuterol HFA in advance of activity  Mucinex  Prednisone 10 mg per day. Seems to be Prednisone dependent. Discussed long term complications of steroid therapy. Will continue level dose of Prednisone 10 per day. 2. Centrilobular emphysema (HCC)  CT Chest:8/18    Impression   No significant change in fibrosis which is inconsistent with UIP pattern due   to extensive ground-glass component.  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       Slight interval increase in size of the 8 mm nodule in the left lower lobe. This has been present for greater than 2 years, though consider continued   follow-up imaging given slight increase.       Unchanged severe emphysema. 3. Pulmonary fibrosis ([address]  CT Chest: 8/18:  Impression   No significant change in fibrosis which is inconsistent with UIP pattern due   to extensive ground-glass component.  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       Slight interval increase in size of the 8 mm nodule in the left lower lobe.    This has been present for greater than 2 years, though consider continued   follow-up mLs by nebulization every 6 hours as needed for Wheezing DX J44.9, J43.2, J84.10  Maggie Pompa MD   SYMBICORT 160-4.5 MCG/ACT AERO USE 2 INHALATIONS TWICE A DAY  MD Feroz Loza strip USE TO TEST DAILY  Maggie Pompa MD   calcium carbonate 600 MG TABS tablet Take 2 tablets by mouth daily  Historical Provider, MD   metoprolol succinate (TOPROL XL) 50 MG extended release tablet Take 0.5 tablets by mouth nightly  Maggie Pompa, MD   alendronate (FOSAMAX) 70 MG tablet Take 1 tablet by mouth every 7 days  Maggie Pompa MD   simvastatin (ZOCOR) 40 MG tablet Take 1 tablet by mouth nightly  Maggie Pompa, MD   allopurinol (ZYLOPRIM) 100 MG tablet TAKE ONE TABLET BY MOUTH DAILY  Maggie Pompa, MD   clopidogrel (PLAVIX) 75 MG tablet Take 1 tablet by mouth daily  Maggie Pompa MD   albuterol sulfate HFA (PROAIR HFA) 108 (90 Base) MCG/ACT inhaler Inhale 2 puffs into the lungs daily as needed for Shortness of Breath  Patient taking differently: Inhale 2 puffs into the lungs daily as needed for Shortness of Breath  Maggie Pompa MD   tiotropium (Marliss Eriksson) 18 MCG inhalation capsule Inhale 1 capsule into the lungs daily  Maggie Pompa MD   lisinopril (PRINIVIL;ZESTRIL) 5 MG tablet Take 1 tablet by mouth daily  Maggie Pompa MD   predniSONE (DELTASONE) 10 MG tablet Take 10 mg by mouth daily  Historical Provider, MD Rupali Angel 42 LANCETS 07U MISC Use to check blood sugar once daily. E11.9  Maggie Pompa MD   Selenium 200 MCG CAPS Take  by mouth daily. Historical Provider, MD   Zinc 50 MG TABS Take 50 mg by mouth daily. Historical Provider, MD   Ascorbic Acid (VITAMIN C) 500 MG tablet Take 500 mg by mouth daily. Historical Provider, MD   vitamin D (ERGOCALCIFEROL) 83215 UNIT CAPS capsule Take 2,000 Units by mouth Daily. Historical Provider, MD   Cyanocobalamin (VITAMIN B 12 PO) Take 500 mcg by mouth daily at 1800.   Historical Provider, MD       Vitals:

## 2019-07-31 ENCOUNTER — OFFICE VISIT (OUTPATIENT)
Dept: CARDIOLOGY CLINIC | Age: 84
End: 2019-07-31
Payer: MEDICARE

## 2019-07-31 VITALS
BODY MASS INDEX: 31.14 KG/M2 | DIASTOLIC BLOOD PRESSURE: 58 MMHG | WEIGHT: 186.9 LBS | SYSTOLIC BLOOD PRESSURE: 128 MMHG | HEIGHT: 65 IN | HEART RATE: 94 BPM

## 2019-07-31 DIAGNOSIS — I47.1 SVT (SUPRAVENTRICULAR TACHYCARDIA) (HCC): Primary | ICD-10-CM

## 2019-07-31 DIAGNOSIS — I10 ESSENTIAL HYPERTENSION: ICD-10-CM

## 2019-07-31 DIAGNOSIS — R00.2 PALPITATIONS: ICD-10-CM

## 2019-07-31 PROBLEM — I47.10 SVT (SUPRAVENTRICULAR TACHYCARDIA): Status: ACTIVE | Noted: 2019-07-31

## 2019-07-31 PROCEDURE — G8598 ASA/ANTIPLAT THER USED: HCPCS | Performed by: INTERNAL MEDICINE

## 2019-07-31 PROCEDURE — 99204 OFFICE O/P NEW MOD 45 MIN: CPT | Performed by: INTERNAL MEDICINE

## 2019-07-31 PROCEDURE — 1036F TOBACCO NON-USER: CPT | Performed by: INTERNAL MEDICINE

## 2019-07-31 PROCEDURE — G8417 CALC BMI ABV UP PARAM F/U: HCPCS | Performed by: INTERNAL MEDICINE

## 2019-07-31 PROCEDURE — 1123F ACP DISCUSS/DSCN MKR DOCD: CPT | Performed by: INTERNAL MEDICINE

## 2019-07-31 PROCEDURE — 4040F PNEUMOC VAC/ADMIN/RCVD: CPT | Performed by: INTERNAL MEDICINE

## 2019-07-31 PROCEDURE — G8427 DOCREV CUR MEDS BY ELIG CLIN: HCPCS | Performed by: INTERNAL MEDICINE

## 2019-07-31 PROCEDURE — 93000 ELECTROCARDIOGRAM COMPLETE: CPT | Performed by: INTERNAL MEDICINE

## 2019-07-31 NOTE — LETTER
lisinopril (PRINIVIL;ZESTRIL) 5 MG tablet Take 1 tablet by mouth daily Yes Nando Matute MD   predniSONE (DELTASONE) 10 MG tablet Take 10 mg by mouth daily Yes Historical Provider, MD Pardeep Beltran LANCETS 64P MISC Use to check blood sugar once daily. E11.9 Yes Nando Matute MD   Selenium 200 MCG CAPS Take  by mouth daily. Yes Historical Provider, MD   Zinc 50 MG TABS Take 50 mg by mouth daily. Yes Historical Provider, MD   Ascorbic Acid (VITAMIN C) 500 MG tablet Take 500 mg by mouth daily. Yes Historical Provider, MD   vitamin D (ERGOCALCIFEROL) 08283 UNIT CAPS capsule Take 2,000 Units by mouth Daily. Yes Historical Provider, MD   Cyanocobalamin (VITAMIN B 12 PO) Take 500 mcg by mouth daily at 1800. Yes Historical Provider, MD       [x] Medications and dosages reviewed. ROS:  [x]Full ROS obtained and negative except as mentioned in HPI      Physical Examination:    Vitals:    07/31/19 0922   BP: (!) 128/58   Site: Left Upper Arm   Position: Sitting   Cuff Size: Medium Adult   Pulse: 94   Weight: 186 lb 14.4 oz (84.8 kg)   Height: 5' 5\" (1.651 m)        · GENERAL: Elderly, chronically ill appearing male on O2 appearing mildly dyspneic No acute distress  · NEUROLOGICAL: Alert and oriented  · PSYCH: Calm affect  · SKIN: Warm and dry, No visible rash,   · EYES: Pupils equal and round, Sclera non-icteric,   · HENT:  External ears and nose unremarkable, mucus membranes moist  · MUSCULOSKELETAL: Normal cephalic, neck supple  · CAROTID: Normal upstroke, no bruits  · CARDIAC: JVP normal, Normal PMI, regular rate and rhythm, normal S1S2, no murmur, rub, or gallop  · RESPIRATORY: Normal respiratory effort, Poor air movement. Minimal wheezing  · EXTREMITIES: No edema  · GASTROINTESTINAL: normal bowel sounds, soft, non-tender, No hepatomegaly     EKG: NSR, Normal    Assessment:     Tachycardia/palpitation:    Episodes sound like SVT. Place Event monitor to document rhythm. If SVT likely will add cardizem. He would be poor candidate for ablation. If afib/flutter will have to discuss anticoagulation, but will be at increased bleeding risk    HTN:  BP (!) 128/58 (Site: Left Upper Arm, Position: Sitting, Cuff Size: Medium Adult)   Pulse 94   Ht 5' 5\" (1.651 m)   Wt 186 lb 14.4 oz (84.8 kg)   BMI 31.10 kg/m²    Controlled.  Follow    Plan:  Event monitor  F/u few weeks with ECHO    Thank you for allowing me to participate in the care of this individual.      Isa Arndt M.D., [facility]    Sincerely,        Vishnu Mera MD

## 2019-08-02 ENCOUNTER — TELEPHONE (OUTPATIENT)
Dept: PULMONOLOGY | Age: 84
End: 2019-08-02

## 2019-08-02 NOTE — TELEPHONE ENCOUNTER
Patient's spouse Rashad Gonzalezr called with question about the prescription that was prescribed on 7/30/19. They were under the impression that patient should be on prednisone 20 mg for 1 month and doxycycline for 10 days, however when they went to pharmacy they picked up 10 days of prednisone with 3 refills and 30 days of doxycycline and wanted to know which one is correct so they know what the patient needs to take.   [phone]

## 2019-08-15 ENCOUNTER — TELEPHONE (OUTPATIENT)
Dept: CARDIOLOGY CLINIC | Age: 84
End: 2019-08-15

## 2019-08-15 DIAGNOSIS — I47.1 SVT (SUPRAVENTRICULAR TACHYCARDIA) (HCC): Primary | ICD-10-CM

## 2019-08-16 RX ORDER — SIMVASTATIN 20 MG
20 TABLET ORAL NIGHTLY
COMMUNITY
End: 2020-01-01 | Stop reason: SDUPTHER

## 2019-08-16 RX ORDER — DILTIAZEM HYDROCHLORIDE 120 MG/1
120 CAPSULE, COATED, EXTENDED RELEASE ORAL DAILY
Qty: 30 CAPSULE | Refills: 12 | Status: SHIPPED | OUTPATIENT
Start: 2019-08-16 | End: 2020-02-26 | Stop reason: SDUPTHER

## 2019-08-20 ENCOUNTER — OFFICE VISIT (OUTPATIENT)
Dept: PULMONOLOGY | Age: 84
End: 2019-08-20
Payer: MEDICARE

## 2019-08-20 VITALS
WEIGHT: 185 LBS | SYSTOLIC BLOOD PRESSURE: 100 MMHG | DIASTOLIC BLOOD PRESSURE: 58 MMHG | BODY MASS INDEX: 30.79 KG/M2 | OXYGEN SATURATION: 91 % | HEART RATE: 72 BPM

## 2019-08-20 DIAGNOSIS — R91.1 PULMONARY NODULE: ICD-10-CM

## 2019-08-20 DIAGNOSIS — J43.2 CENTRILOBULAR EMPHYSEMA (HCC): ICD-10-CM

## 2019-08-20 DIAGNOSIS — J44.9 COPD, MILD (HCC): Primary | ICD-10-CM

## 2019-08-20 DIAGNOSIS — J84.10 PULMONARY FIBROSIS (HCC): ICD-10-CM

## 2019-08-20 DIAGNOSIS — G47.33 OBSTRUCTIVE SLEEP APNEA: ICD-10-CM

## 2019-08-20 DIAGNOSIS — J96.11 CHRONIC RESPIRATORY FAILURE WITH HYPOXIA (HCC): ICD-10-CM

## 2019-08-20 PROCEDURE — 1123F ACP DISCUSS/DSCN MKR DOCD: CPT | Performed by: INTERNAL MEDICINE

## 2019-08-20 PROCEDURE — G8926 SPIRO NO PERF OR DOC: HCPCS | Performed by: INTERNAL MEDICINE

## 2019-08-20 PROCEDURE — 1036F TOBACCO NON-USER: CPT | Performed by: INTERNAL MEDICINE

## 2019-08-20 PROCEDURE — 99214 OFFICE O/P EST MOD 30 MIN: CPT | Performed by: INTERNAL MEDICINE

## 2019-08-20 PROCEDURE — 4040F PNEUMOC VAC/ADMIN/RCVD: CPT | Performed by: INTERNAL MEDICINE

## 2019-08-20 PROCEDURE — G8598 ASA/ANTIPLAT THER USED: HCPCS | Performed by: INTERNAL MEDICINE

## 2019-08-20 PROCEDURE — 3023F SPIROM DOC REV: CPT | Performed by: INTERNAL MEDICINE

## 2019-08-20 PROCEDURE — G8427 DOCREV CUR MEDS BY ELIG CLIN: HCPCS | Performed by: INTERNAL MEDICINE

## 2019-08-20 PROCEDURE — G8417 CALC BMI ABV UP PARAM F/U: HCPCS | Performed by: INTERNAL MEDICINE

## 2019-08-30 ENCOUNTER — OFFICE VISIT (OUTPATIENT)
Dept: CARDIOLOGY CLINIC | Age: 84
End: 2019-08-30
Payer: MEDICARE

## 2019-08-30 ENCOUNTER — HOSPITAL ENCOUNTER (OUTPATIENT)
Dept: NON INVASIVE DIAGNOSTICS | Age: 84
Discharge: HOME | End: 2019-08-30
Payer: MEDICARE

## 2019-08-30 VITALS
DIASTOLIC BLOOD PRESSURE: 52 MMHG | WEIGHT: 187.5 LBS | BODY MASS INDEX: 31.24 KG/M2 | HEIGHT: 65 IN | HEART RATE: 72 BPM | SYSTOLIC BLOOD PRESSURE: 136 MMHG | OXYGEN SATURATION: 88 %

## 2019-08-30 DIAGNOSIS — I47.1 SVT (SUPRAVENTRICULAR TACHYCARDIA) (HCC): ICD-10-CM

## 2019-08-30 DIAGNOSIS — R00.2 PALPITATIONS: ICD-10-CM

## 2019-08-30 DIAGNOSIS — I10 ESSENTIAL HYPERTENSION: ICD-10-CM

## 2019-08-30 DIAGNOSIS — I47.29 NSVT (NONSUSTAINED VENTRICULAR TACHYCARDIA): ICD-10-CM

## 2019-08-30 DIAGNOSIS — I47.1 SVT (SUPRAVENTRICULAR TACHYCARDIA) (HCC): Primary | ICD-10-CM

## 2019-08-30 LAB
LEFT VENTRICULAR EJECTION FRACTION HIGH VALUE: 70 %
LEFT VENTRICULAR EJECTION FRACTION MODE: NORMAL
LV EF: 70 %
LVEF MODALITY: NORMAL

## 2019-08-30 PROCEDURE — G8417 CALC BMI ABV UP PARAM F/U: HCPCS | Performed by: INTERNAL MEDICINE

## 2019-08-30 PROCEDURE — G8598 ASA/ANTIPLAT THER USED: HCPCS | Performed by: INTERNAL MEDICINE

## 2019-08-30 PROCEDURE — 99214 OFFICE O/P EST MOD 30 MIN: CPT | Performed by: INTERNAL MEDICINE

## 2019-08-30 PROCEDURE — 4040F PNEUMOC VAC/ADMIN/RCVD: CPT | Performed by: INTERNAL MEDICINE

## 2019-08-30 PROCEDURE — 1036F TOBACCO NON-USER: CPT | Performed by: INTERNAL MEDICINE

## 2019-08-30 PROCEDURE — 1123F ACP DISCUSS/DSCN MKR DOCD: CPT | Performed by: INTERNAL MEDICINE

## 2019-08-30 PROCEDURE — G8427 DOCREV CUR MEDS BY ELIG CLIN: HCPCS | Performed by: INTERNAL MEDICINE

## 2019-08-30 PROCEDURE — 93306 TTE W/DOPPLER COMPLETE: CPT

## 2019-09-05 PROCEDURE — 93228 REMOTE 30 DAY ECG REV/REPORT: CPT | Performed by: INTERNAL MEDICINE

## 2019-09-06 ENCOUNTER — TELEPHONE (OUTPATIENT)
Dept: CARDIOLOGY CLINIC | Age: 84
End: 2019-09-06

## 2019-10-16 ENCOUNTER — TELEPHONE (OUTPATIENT)
Dept: PULMONOLOGY | Age: 84
End: 2019-10-16

## 2019-10-16 RX ORDER — PREDNISONE 10 MG/1
10 TABLET ORAL DAILY
Qty: 30 TABLET | Refills: 3 | Status: SHIPPED | OUTPATIENT
Start: 2019-10-16 | End: 2020-01-14 | Stop reason: SDUPTHER

## 2019-11-15 ENCOUNTER — HOSPITAL ENCOUNTER (OUTPATIENT)
Age: 84
Discharge: HOME | End: 2019-11-15
Payer: MEDICARE

## 2019-11-15 DIAGNOSIS — E11.9 TYPE 2 DIABETES MELLITUS WITHOUT COMPLICATION, WITHOUT LONG-TERM CURRENT USE OF INSULIN (HCC): ICD-10-CM

## 2019-11-15 DIAGNOSIS — I10 ESSENTIAL HYPERTENSION: ICD-10-CM

## 2019-11-15 LAB
A/G RATIO: 0.9 (ref 1.1–2.2)
ALBUMIN SERPL-MCNC: 3.4 G/DL (ref 3.4–5)
ALP BLD-CCNC: 75 U/L (ref 40–129)
ALT SERPL-CCNC: 23 U/L (ref 10–40)
ANION GAP SERPL CALCULATED.3IONS-SCNC: 12 MMOL/L (ref 3–16)
AST SERPL-CCNC: 15 U/L (ref 15–37)
BILIRUB SERPL-MCNC: 0.3 MG/DL (ref 0–1)
BUN BLDV-MCNC: 21 MG/DL (ref 7–20)
CALCIUM SERPL-MCNC: 9 MG/DL (ref 8.3–10.6)
CHLORIDE BLD-SCNC: 101 MMOL/L (ref 99–110)
CHOLESTEROL, TOTAL: 165 MG/DL (ref 0–199)
CO2: 29 MMOL/L (ref 21–32)
CREAT SERPL-MCNC: 0.9 MG/DL (ref 0.8–1.3)
CREATININE URINE: 72.3 MG/DL (ref 39–259)
ESTIMATED AVERAGE GLUCOSE: 151.3 MG/DL
GFR AFRICAN AMERICAN: >60
GFR NON-AFRICAN AMERICAN: >60
GLOBULIN: 3.9 G/DL
GLUCOSE BLD-MCNC: 125 MG/DL (ref 70–99)
HBA1C MFR BLD: 6.9 %
HCT VFR BLD CALC: 41.9 % (ref 40.5–52.5)
HDLC SERPL-MCNC: 60 MG/DL (ref 40–60)
HEMOGLOBIN: 13.7 G/DL (ref 13.5–17.5)
LDL CHOLESTEROL CALCULATED: 81 MG/DL
MCH RBC QN AUTO: 30 PG (ref 26–34)
MCHC RBC AUTO-ENTMCNC: 32.8 G/DL (ref 31–36)
MCV RBC AUTO: 91.7 FL (ref 80–100)
MICROALBUMIN UR-MCNC: 5.9 MG/DL
MICROALBUMIN/CREAT UR-RTO: 81.6 MG/G (ref 0–30)
PDW BLD-RTO: 15.9 % (ref 12.4–15.4)
PLATELET # BLD: 170 K/UL (ref 135–450)
PMV BLD AUTO: 9.9 FL (ref 5–10.5)
POTASSIUM SERPL-SCNC: 4.1 MMOL/L (ref 3.5–5.1)
RBC # BLD: 4.57 M/UL (ref 4.2–5.9)
SODIUM BLD-SCNC: 142 MMOL/L (ref 136–145)
TOTAL PROTEIN: 7.3 G/DL (ref 6.4–8.2)
TRIGL SERPL-MCNC: 119 MG/DL (ref 0–150)
VLDLC SERPL CALC-MCNC: 24 MG/DL
WBC # BLD: 10.3 K/UL (ref 4–11)

## 2019-11-15 PROCEDURE — 80061 LIPID PANEL: CPT

## 2019-11-15 PROCEDURE — 82043 UR ALBUMIN QUANTITATIVE: CPT

## 2019-11-15 PROCEDURE — 80053 COMPREHEN METABOLIC PANEL: CPT

## 2019-11-15 PROCEDURE — 85027 COMPLETE CBC AUTOMATED: CPT

## 2019-11-15 PROCEDURE — 83036 HEMOGLOBIN GLYCOSYLATED A1C: CPT

## 2019-11-15 PROCEDURE — 82570 ASSAY OF URINE CREATININE: CPT

## 2019-11-15 PROCEDURE — 36415 COLL VENOUS BLD VENIPUNCTURE: CPT

## 2019-11-25 ENCOUNTER — OFFICE VISIT (OUTPATIENT)
Dept: PULMONOLOGY | Age: 84
End: 2019-11-25
Payer: MEDICARE

## 2019-11-25 VITALS — HEART RATE: 71 BPM | OXYGEN SATURATION: 90 %

## 2019-11-25 DIAGNOSIS — J43.2 CENTRILOBULAR EMPHYSEMA (HCC): ICD-10-CM

## 2019-11-25 DIAGNOSIS — J84.10 PULMONARY FIBROSIS (HCC): ICD-10-CM

## 2019-11-25 DIAGNOSIS — J44.9 COPD, MILD (HCC): Primary | ICD-10-CM

## 2019-11-25 DIAGNOSIS — J96.11 CHRONIC RESPIRATORY FAILURE WITH HYPOXIA (HCC): ICD-10-CM

## 2019-11-25 PROCEDURE — G8598 ASA/ANTIPLAT THER USED: HCPCS | Performed by: INTERNAL MEDICINE

## 2019-11-25 PROCEDURE — G8427 DOCREV CUR MEDS BY ELIG CLIN: HCPCS | Performed by: INTERNAL MEDICINE

## 2019-11-25 PROCEDURE — G8484 FLU IMMUNIZE NO ADMIN: HCPCS | Performed by: INTERNAL MEDICINE

## 2019-11-25 PROCEDURE — G8926 SPIRO NO PERF OR DOC: HCPCS | Performed by: INTERNAL MEDICINE

## 2019-11-25 PROCEDURE — 3023F SPIROM DOC REV: CPT | Performed by: INTERNAL MEDICINE

## 2019-11-25 PROCEDURE — G8417 CALC BMI ABV UP PARAM F/U: HCPCS | Performed by: INTERNAL MEDICINE

## 2019-11-25 PROCEDURE — 4040F PNEUMOC VAC/ADMIN/RCVD: CPT | Performed by: INTERNAL MEDICINE

## 2019-11-25 PROCEDURE — 99214 OFFICE O/P EST MOD 30 MIN: CPT | Performed by: INTERNAL MEDICINE

## 2019-11-25 PROCEDURE — 1036F TOBACCO NON-USER: CPT | Performed by: INTERNAL MEDICINE

## 2019-11-25 PROCEDURE — 1123F ACP DISCUSS/DSCN MKR DOCD: CPT | Performed by: INTERNAL MEDICINE

## 2019-11-25 RX ORDER — PREDNISONE 20 MG/1
20 TABLET ORAL DAILY
Qty: 10 TABLET | Refills: 3 | Status: SHIPPED | OUTPATIENT
Start: 2019-11-25 | End: 2019-12-05

## 2019-12-10 ENCOUNTER — OFFICE VISIT (OUTPATIENT)
Dept: FAMILY MEDICINE CLINIC | Age: 84
End: 2019-12-10
Payer: MEDICARE

## 2019-12-10 VITALS
SYSTOLIC BLOOD PRESSURE: 120 MMHG | BODY MASS INDEX: 31.95 KG/M2 | HEART RATE: 78 BPM | OXYGEN SATURATION: 91 % | DIASTOLIC BLOOD PRESSURE: 62 MMHG | WEIGHT: 192 LBS

## 2019-12-10 DIAGNOSIS — Z99.81 OXYGEN DEPENDENT: ICD-10-CM

## 2019-12-10 DIAGNOSIS — R80.9 TYPE 2 DIABETES MELLITUS WITH MICROALBUMINURIA, WITHOUT LONG-TERM CURRENT USE OF INSULIN (HCC): Primary | ICD-10-CM

## 2019-12-10 DIAGNOSIS — I10 ESSENTIAL HYPERTENSION: ICD-10-CM

## 2019-12-10 DIAGNOSIS — E11.29 TYPE 2 DIABETES MELLITUS WITH MICROALBUMINURIA, WITHOUT LONG-TERM CURRENT USE OF INSULIN (HCC): Primary | ICD-10-CM

## 2019-12-10 DIAGNOSIS — E78.00 PURE HYPERCHOLESTEROLEMIA: ICD-10-CM

## 2019-12-10 PROCEDURE — 99214 OFFICE O/P EST MOD 30 MIN: CPT | Performed by: FAMILY MEDICINE

## 2019-12-10 PROCEDURE — G8484 FLU IMMUNIZE NO ADMIN: HCPCS | Performed by: FAMILY MEDICINE

## 2019-12-10 PROCEDURE — 4040F PNEUMOC VAC/ADMIN/RCVD: CPT | Performed by: FAMILY MEDICINE

## 2019-12-10 PROCEDURE — G8598 ASA/ANTIPLAT THER USED: HCPCS | Performed by: FAMILY MEDICINE

## 2019-12-10 PROCEDURE — G8427 DOCREV CUR MEDS BY ELIG CLIN: HCPCS | Performed by: FAMILY MEDICINE

## 2019-12-10 PROCEDURE — G8417 CALC BMI ABV UP PARAM F/U: HCPCS | Performed by: FAMILY MEDICINE

## 2019-12-10 PROCEDURE — 1123F ACP DISCUSS/DSCN MKR DOCD: CPT | Performed by: FAMILY MEDICINE

## 2019-12-10 PROCEDURE — 1036F TOBACCO NON-USER: CPT | Performed by: FAMILY MEDICINE

## 2019-12-10 ASSESSMENT — PATIENT HEALTH QUESTIONNAIRE - PHQ9
SUM OF ALL RESPONSES TO PHQ QUESTIONS 1-9: 0
1. LITTLE INTEREST OR PLEASURE IN DOING THINGS: 0
SUM OF ALL RESPONSES TO PHQ9 QUESTIONS 1 & 2: 0
SUM OF ALL RESPONSES TO PHQ QUESTIONS 1-9: 0
2. FEELING DOWN, DEPRESSED OR HOPELESS: 0

## 2020-01-01 ENCOUNTER — IMMUNIZATION (OUTPATIENT)
Dept: FAMILY MEDICINE CLINIC | Age: 85
End: 2020-01-01
Payer: MEDICARE

## 2020-01-01 ENCOUNTER — TELEPHONE (OUTPATIENT)
Dept: PULMONOLOGY | Age: 85
End: 2020-01-01

## 2020-01-01 ENCOUNTER — HOSPITAL ENCOUNTER (OUTPATIENT)
Dept: SPEECH THERAPY | Age: 85
Setting detail: THERAPIES SERIES
Discharge: HOME | End: 2020-07-27
Payer: MEDICARE

## 2020-01-01 ENCOUNTER — HOSPITAL ENCOUNTER (OUTPATIENT)
Dept: CT IMAGING | Age: 85
Discharge: HOME | End: 2020-05-24
Payer: MEDICARE

## 2020-01-01 ENCOUNTER — HOSPITAL ENCOUNTER (OUTPATIENT)
Dept: SPEECH THERAPY | Age: 85
Setting detail: THERAPIES SERIES
Discharge: HOME | End: 2020-06-23
Payer: MEDICARE

## 2020-01-01 ENCOUNTER — OFFICE VISIT (OUTPATIENT)
Dept: FAMILY MEDICINE CLINIC | Age: 85
End: 2020-01-01
Payer: MEDICARE

## 2020-01-01 ENCOUNTER — PATIENT MESSAGE (OUTPATIENT)
Dept: FAMILY MEDICINE CLINIC | Age: 85
End: 2020-01-01

## 2020-01-01 ENCOUNTER — HOSPITAL ENCOUNTER (OUTPATIENT)
Dept: SPEECH THERAPY | Age: 85
Setting detail: THERAPIES SERIES
Discharge: HOME | End: 2020-06-29
Payer: MEDICARE

## 2020-01-01 ENCOUNTER — PATIENT MESSAGE (OUTPATIENT)
Dept: PULMONOLOGY | Age: 85
End: 2020-01-01

## 2020-01-01 ENCOUNTER — OFFICE VISIT (OUTPATIENT)
Dept: PRIMARY CARE CLINIC | Age: 85
End: 2020-01-01
Payer: MEDICARE

## 2020-01-01 ENCOUNTER — VIRTUAL VISIT (OUTPATIENT)
Dept: PULMONOLOGY | Age: 85
End: 2020-01-01
Payer: MEDICARE

## 2020-01-01 ENCOUNTER — VIRTUAL VISIT (OUTPATIENT)
Dept: FAMILY MEDICINE CLINIC | Age: 85
End: 2020-01-01
Payer: MEDICARE

## 2020-01-01 ENCOUNTER — TELEPHONE (OUTPATIENT)
Dept: FAMILY MEDICINE CLINIC | Age: 85
End: 2020-01-01

## 2020-01-01 ENCOUNTER — HOSPITAL ENCOUNTER (OUTPATIENT)
Age: 85
Discharge: HOME | End: 2020-06-08
Payer: MEDICARE

## 2020-01-01 ENCOUNTER — HOSPITAL ENCOUNTER (OUTPATIENT)
Age: 85
Discharge: HOME | End: 2020-12-04
Payer: MEDICARE

## 2020-01-01 ENCOUNTER — HOSPITAL ENCOUNTER (OUTPATIENT)
Dept: SPEECH THERAPY | Age: 85
Setting detail: THERAPIES SERIES
Discharge: HOME | End: 2020-07-13
Payer: MEDICARE

## 2020-01-01 ENCOUNTER — OFFICE VISIT (OUTPATIENT)
Dept: PULMONOLOGY | Age: 85
End: 2020-01-01
Payer: MEDICARE

## 2020-01-01 ENCOUNTER — APPOINTMENT (OUTPATIENT)
Dept: SPEECH THERAPY | Age: 85
End: 2020-01-01
Payer: MEDICARE

## 2020-01-01 VITALS — HEART RATE: 64 BPM | TEMPERATURE: 98.2 F | OXYGEN SATURATION: 98 %

## 2020-01-01 VITALS — OXYGEN SATURATION: 95 % | DIASTOLIC BLOOD PRESSURE: 61 MMHG | HEART RATE: 58 BPM | SYSTOLIC BLOOD PRESSURE: 100 MMHG

## 2020-01-01 VITALS
TEMPERATURE: 97.1 F | SYSTOLIC BLOOD PRESSURE: 110 MMHG | DIASTOLIC BLOOD PRESSURE: 56 MMHG | HEART RATE: 70 BPM | OXYGEN SATURATION: 92 % | BODY MASS INDEX: 31.45 KG/M2 | WEIGHT: 189 LBS

## 2020-01-01 LAB
A/G RATIO: 1.3 (ref 1.1–2.2)
ALBUMIN SERPL-MCNC: 3.7 G/DL (ref 3.4–5)
ALP BLD-CCNC: 79 U/L (ref 40–129)
ALT SERPL-CCNC: 22 U/L (ref 10–40)
ANION GAP SERPL CALCULATED.3IONS-SCNC: 9 MMOL/L (ref 3–16)
ANION GAP SERPL CALCULATED.3IONS-SCNC: 9 MMOL/L (ref 3–16)
AST SERPL-CCNC: 20 U/L (ref 15–37)
BILIRUB SERPL-MCNC: 0.4 MG/DL (ref 0–1)
BUN BLDV-MCNC: 19 MG/DL (ref 7–20)
BUN BLDV-MCNC: 27 MG/DL (ref 7–20)
CALCIUM SERPL-MCNC: 8.7 MG/DL (ref 8.3–10.6)
CALCIUM SERPL-MCNC: 8.9 MG/DL (ref 8.3–10.6)
CHLORIDE BLD-SCNC: 102 MMOL/L (ref 99–110)
CHLORIDE BLD-SCNC: 102 MMOL/L (ref 99–110)
CHOLESTEROL, TOTAL: 167 MG/DL (ref 0–199)
CO2: 31 MMOL/L (ref 21–32)
CO2: 31 MMOL/L (ref 21–32)
CREAT SERPL-MCNC: 0.9 MG/DL (ref 0.8–1.3)
CREAT SERPL-MCNC: 0.9 MG/DL (ref 0.8–1.3)
CREATININE URINE: 75 MG/DL (ref 39–259)
ESTIMATED AVERAGE GLUCOSE: 162.8 MG/DL
ESTIMATED AVERAGE GLUCOSE: 162.8 MG/DL
FOLATE: 12.14 NG/ML (ref 4.78–24.2)
GFR AFRICAN AMERICAN: >60
GFR AFRICAN AMERICAN: >60
GFR NON-AFRICAN AMERICAN: >60
GFR NON-AFRICAN AMERICAN: >60
GLOBULIN: 2.9 G/DL
GLUCOSE BLD-MCNC: 130 MG/DL (ref 70–99)
GLUCOSE BLD-MCNC: 142 MG/DL (ref 70–99)
HBA1C MFR BLD: 7.3 %
HBA1C MFR BLD: 7.3 %
HCT VFR BLD CALC: 41.1 % (ref 40.5–52.5)
HDLC SERPL-MCNC: 63 MG/DL (ref 40–60)
HEMOGLOBIN: 13.4 G/DL (ref 13.5–17.5)
LDL CHOLESTEROL CALCULATED: 81 MG/DL
MCH RBC QN AUTO: 29.1 PG (ref 26–34)
MCHC RBC AUTO-ENTMCNC: 32.6 G/DL (ref 31–36)
MCV RBC AUTO: 89.2 FL (ref 80–100)
MICROALBUMIN UR-MCNC: 5.8 MG/DL
MICROALBUMIN/CREAT UR-RTO: 77.3 MG/G (ref 0–30)
PDW BLD-RTO: 15.7 % (ref 12.4–15.4)
PLATELET # BLD: 168 K/UL (ref 135–450)
PMV BLD AUTO: 9.9 FL (ref 5–10.5)
POTASSIUM SERPL-SCNC: 3.9 MMOL/L (ref 3.5–5.1)
POTASSIUM SERPL-SCNC: 3.9 MMOL/L (ref 3.5–5.1)
RBC # BLD: 4.61 M/UL (ref 4.2–5.9)
SARS-COV-2, NAA: NOT DETECTED
SODIUM BLD-SCNC: 142 MMOL/L (ref 136–145)
SODIUM BLD-SCNC: 142 MMOL/L (ref 136–145)
TOTAL PROTEIN: 6.6 G/DL (ref 6.4–8.2)
TRIGL SERPL-MCNC: 113 MG/DL (ref 0–150)
TSH REFLEX: 2.16 UIU/ML (ref 0.27–4.2)
VITAMIN B-12: 1577 PG/ML (ref 211–911)
VLDLC SERPL CALC-MCNC: 23 MG/DL
WBC # BLD: 13.1 K/UL (ref 4–11)

## 2020-01-01 PROCEDURE — G8926 SPIRO NO PERF OR DOC: HCPCS | Performed by: FAMILY MEDICINE

## 2020-01-01 PROCEDURE — 99214 OFFICE O/P EST MOD 30 MIN: CPT | Performed by: FAMILY MEDICINE

## 2020-01-01 PROCEDURE — 84443 ASSAY THYROID STIM HORMONE: CPT

## 2020-01-01 PROCEDURE — 3023F SPIROM DOC REV: CPT | Performed by: INTERNAL MEDICINE

## 2020-01-01 PROCEDURE — 82043 UR ALBUMIN QUANTITATIVE: CPT

## 2020-01-01 PROCEDURE — 99214 OFFICE O/P EST MOD 30 MIN: CPT | Performed by: INTERNAL MEDICINE

## 2020-01-01 PROCEDURE — G8427 DOCREV CUR MEDS BY ELIG CLIN: HCPCS | Performed by: INTERNAL MEDICINE

## 2020-01-01 PROCEDURE — 80048 BASIC METABOLIC PNL TOTAL CA: CPT

## 2020-01-01 PROCEDURE — 83036 HEMOGLOBIN GLYCOSYLATED A1C: CPT

## 2020-01-01 PROCEDURE — G8926 SPIRO NO PERF OR DOC: HCPCS | Performed by: INTERNAL MEDICINE

## 2020-01-01 PROCEDURE — G0008 ADMIN INFLUENZA VIRUS VAC: HCPCS | Performed by: FAMILY MEDICINE

## 2020-01-01 PROCEDURE — G8428 CUR MEDS NOT DOCUMENT: HCPCS | Performed by: FAMILY MEDICINE

## 2020-01-01 PROCEDURE — 99213 OFFICE O/P EST LOW 20 MIN: CPT | Performed by: FAMILY MEDICINE

## 2020-01-01 PROCEDURE — 3051F HG A1C>EQUAL 7.0%<8.0%: CPT | Performed by: FAMILY MEDICINE

## 2020-01-01 PROCEDURE — 71250 CT THORAX DX C-: CPT

## 2020-01-01 PROCEDURE — 92526 ORAL FUNCTION THERAPY: CPT

## 2020-01-01 PROCEDURE — G8427 DOCREV CUR MEDS BY ELIG CLIN: HCPCS | Performed by: FAMILY MEDICINE

## 2020-01-01 PROCEDURE — 1123F ACP DISCUSS/DSCN MKR DOCD: CPT | Performed by: FAMILY MEDICINE

## 2020-01-01 PROCEDURE — 36415 COLL VENOUS BLD VENIPUNCTURE: CPT

## 2020-01-01 PROCEDURE — 1036F TOBACCO NON-USER: CPT | Performed by: FAMILY MEDICINE

## 2020-01-01 PROCEDURE — 1036F TOBACCO NON-USER: CPT | Performed by: INTERNAL MEDICINE

## 2020-01-01 PROCEDURE — 1123F ACP DISCUSS/DSCN MKR DOCD: CPT | Performed by: INTERNAL MEDICINE

## 2020-01-01 PROCEDURE — 99211 OFF/OP EST MAY X REQ PHY/QHP: CPT | Performed by: NURSE PRACTITIONER

## 2020-01-01 PROCEDURE — G8417 CALC BMI ABV UP PARAM F/U: HCPCS | Performed by: INTERNAL MEDICINE

## 2020-01-01 PROCEDURE — 85027 COMPLETE CBC AUTOMATED: CPT

## 2020-01-01 PROCEDURE — G8417 CALC BMI ABV UP PARAM F/U: HCPCS | Performed by: FAMILY MEDICINE

## 2020-01-01 PROCEDURE — 82746 ASSAY OF FOLIC ACID SERUM: CPT

## 2020-01-01 PROCEDURE — 4040F PNEUMOC VAC/ADMIN/RCVD: CPT | Performed by: INTERNAL MEDICINE

## 2020-01-01 PROCEDURE — 4040F PNEUMOC VAC/ADMIN/RCVD: CPT | Performed by: FAMILY MEDICINE

## 2020-01-01 PROCEDURE — 90694 VACC AIIV4 NO PRSRV 0.5ML IM: CPT | Performed by: FAMILY MEDICINE

## 2020-01-01 PROCEDURE — G8417 CALC BMI ABV UP PARAM F/U: HCPCS | Performed by: NURSE PRACTITIONER

## 2020-01-01 PROCEDURE — 92610 EVALUATE SWALLOWING FUNCTION: CPT

## 2020-01-01 PROCEDURE — 80053 COMPREHEN METABOLIC PANEL: CPT

## 2020-01-01 PROCEDURE — G8428 CUR MEDS NOT DOCUMENT: HCPCS | Performed by: NURSE PRACTITIONER

## 2020-01-01 PROCEDURE — 80061 LIPID PANEL: CPT

## 2020-01-01 PROCEDURE — 3023F SPIROM DOC REV: CPT | Performed by: FAMILY MEDICINE

## 2020-01-01 PROCEDURE — 82607 VITAMIN B-12: CPT

## 2020-01-01 PROCEDURE — 82570 ASSAY OF URINE CREATININE: CPT

## 2020-01-01 RX ORDER — DOXYCYCLINE HYCLATE 100 MG/1
100 CAPSULE ORAL DAILY
Qty: 10 CAPSULE | Refills: 3 | Status: SHIPPED | OUTPATIENT
Start: 2020-01-01 | End: 2020-01-01

## 2020-01-01 RX ORDER — POLYETHYLENE GLYCOL 3350 17 G/17G
17 POWDER, FOR SOLUTION ORAL DAILY
Qty: 1530 G | Refills: 1 | Status: SHIPPED | OUTPATIENT
Start: 2020-01-01 | End: 2020-01-01

## 2020-01-01 RX ORDER — ESCITALOPRAM OXALATE 5 MG/1
5 TABLET ORAL DAILY
Qty: 30 TABLET | Refills: 5 | Status: SHIPPED | OUTPATIENT
Start: 2020-01-01 | End: 2021-01-01 | Stop reason: SDUPTHER

## 2020-01-01 RX ORDER — BUSPIRONE HYDROCHLORIDE 5 MG/1
5 TABLET ORAL 3 TIMES DAILY
Qty: 90 TABLET | Refills: 2 | Status: SHIPPED | OUTPATIENT
Start: 2020-01-01 | End: 2020-01-01

## 2020-01-01 RX ORDER — BUSPIRONE HYDROCHLORIDE 5 MG/1
5 TABLET ORAL 3 TIMES DAILY
Qty: 90 TABLET | Refills: 1 | Status: SHIPPED | OUTPATIENT
Start: 2020-01-01 | End: 2020-01-01 | Stop reason: SDUPTHER

## 2020-01-01 RX ORDER — BUDESONIDE 0.5 MG/2ML
500 INHALANT ORAL 2 TIMES DAILY
Qty: 120 ML | Refills: 11 | Status: SHIPPED | OUTPATIENT
Start: 2020-01-01 | End: 2021-12-21

## 2020-01-01 RX ORDER — BUSPIRONE HYDROCHLORIDE 5 MG/1
5 TABLET ORAL 3 TIMES DAILY
Qty: 90 TABLET | Refills: 12 | Status: SHIPPED | OUTPATIENT
Start: 2020-01-01 | End: 2021-01-01 | Stop reason: SDUPTHER

## 2020-01-01 RX ORDER — DOXYCYCLINE HYCLATE 100 MG
100 TABLET ORAL 2 TIMES DAILY
Qty: 20 TABLET | Refills: 2 | Status: CANCELLED | OUTPATIENT
Start: 2020-01-01 | End: 2020-01-01

## 2020-01-01 RX ORDER — SIMVASTATIN 20 MG
20 TABLET ORAL NIGHTLY
Qty: 90 TABLET | Refills: 3 | Status: SHIPPED | OUTPATIENT
Start: 2020-01-01 | End: 2021-01-01 | Stop reason: SDUPTHER

## 2020-01-01 RX ORDER — LANOLIN ALCOHOL/MO/W.PET/CERES
5 CREAM (GRAM) TOPICAL DAILY
COMMUNITY
End: 2021-01-01

## 2020-01-01 RX ORDER — DOXYCYCLINE HYCLATE 100 MG
100 TABLET ORAL 2 TIMES DAILY
Qty: 28 TABLET | Refills: 0 | Status: SHIPPED | OUTPATIENT
Start: 2020-01-01 | End: 2020-01-01

## 2020-01-01 RX ORDER — ARFORMOTEROL TARTRATE 15 UG/2ML
1 SOLUTION RESPIRATORY (INHALATION) 2 TIMES DAILY
Qty: 120 ML | Refills: 11 | Status: SHIPPED | OUTPATIENT
Start: 2020-01-01

## 2020-01-01 RX ORDER — PREDNISONE 10 MG/1
TABLET ORAL
Qty: 30 TABLET | Refills: 0 | Status: SHIPPED | OUTPATIENT
Start: 2020-01-01 | End: 2020-01-01

## 2020-01-01 RX ORDER — ALLOPURINOL 100 MG/1
TABLET ORAL
Qty: 90 TABLET | Refills: 3 | Status: SHIPPED | OUTPATIENT
Start: 2020-01-01 | End: 2021-01-01 | Stop reason: SDUPTHER

## 2020-01-01 RX ORDER — PREDNISONE 20 MG/1
20 TABLET ORAL DAILY
COMMUNITY

## 2020-01-01 RX ORDER — ALBUTEROL SULFATE 2.5 MG/3ML
2.5 SOLUTION RESPIRATORY (INHALATION) EVERY 6 HOURS PRN
Qty: 360 EACH | Refills: 3 | Status: SHIPPED | OUTPATIENT
Start: 2020-01-01

## 2020-01-14 RX ORDER — ALBUTEROL SULFATE 2.5 MG/3ML
2.5 SOLUTION RESPIRATORY (INHALATION) EVERY 6 HOURS PRN
Qty: 360 EACH | Refills: 3 | Status: SHIPPED | OUTPATIENT
Start: 2020-01-14 | End: 2020-01-01 | Stop reason: SDUPTHER

## 2020-01-14 RX ORDER — ALENDRONATE SODIUM 70 MG/1
70 TABLET ORAL
Qty: 12 TABLET | Refills: 3 | Status: SHIPPED | OUTPATIENT
Start: 2020-01-14 | End: 2021-01-01 | Stop reason: SDUPTHER

## 2020-01-14 RX ORDER — ALLOPURINOL 100 MG/1
TABLET ORAL
Qty: 90 TABLET | Refills: 3 | Status: SHIPPED | OUTPATIENT
Start: 2020-01-14 | End: 2020-01-01

## 2020-01-14 RX ORDER — LISINOPRIL 5 MG/1
5 TABLET ORAL DAILY
Qty: 90 TABLET | Refills: 3 | Status: SHIPPED | OUTPATIENT
Start: 2020-01-14 | End: 2021-01-01 | Stop reason: SDUPTHER

## 2020-01-14 RX ORDER — DILTIAZEM HYDROCHLORIDE 120 MG/1
120 CAPSULE, COATED, EXTENDED RELEASE ORAL DAILY
Qty: 30 CAPSULE | Refills: 12 | Status: CANCELLED | OUTPATIENT
Start: 2020-01-14

## 2020-01-14 RX ORDER — ALBUTEROL SULFATE 90 UG/1
2 AEROSOL, METERED RESPIRATORY (INHALATION) DAILY PRN
Qty: 3 INHALER | Refills: 3 | Status: SHIPPED | OUTPATIENT
Start: 2020-01-14

## 2020-01-14 RX ORDER — CLOPIDOGREL BISULFATE 75 MG/1
75 TABLET ORAL DAILY
Qty: 90 TABLET | Refills: 3 | Status: SHIPPED | OUTPATIENT
Start: 2020-01-14 | End: 2021-01-01 | Stop reason: SDUPTHER

## 2020-01-14 NOTE — TELEPHONE ENCOUNTER
Spoke with patient's wife, pharmacy and insurance cards have changed and needs to bring in copy for the chart to send to correct pharmacy.

## 2020-01-29 ENCOUNTER — OFFICE VISIT (OUTPATIENT)
Dept: PULMONOLOGY | Age: 85
End: 2020-01-29
Payer: MEDICARE

## 2020-01-29 ENCOUNTER — HOSPITAL ENCOUNTER (INPATIENT)
Age: 85
LOS: 3 days | Discharge: HOME HEALTH | DRG: 189 | End: 2020-02-01
Attending: INTERNAL MEDICINE | Admitting: INTERNAL MEDICINE
Payer: MEDICARE

## 2020-01-29 ENCOUNTER — APPOINTMENT (OUTPATIENT)
Dept: CT IMAGING | Age: 85
DRG: 189 | End: 2020-01-29
Attending: INTERNAL MEDICINE
Payer: MEDICARE

## 2020-01-29 VITALS — SYSTOLIC BLOOD PRESSURE: 121 MMHG | HEART RATE: 97 BPM | DIASTOLIC BLOOD PRESSURE: 70 MMHG | OXYGEN SATURATION: 95 %

## 2020-01-29 PROBLEM — J96.21 ACUTE ON CHRONIC RESPIRATORY FAILURE WITH HYPOXIA (HCC): Status: ACTIVE | Noted: 2020-01-29

## 2020-01-29 LAB
ANION GAP SERPL CALCULATED.3IONS-SCNC: 11 MMOL/L (ref 3–16)
BUN BLDV-MCNC: 15 MG/DL (ref 7–20)
CALCIUM SERPL-MCNC: 9.2 MG/DL (ref 8.3–10.6)
CHLORIDE BLD-SCNC: 99 MMOL/L (ref 99–110)
CO2: 29 MMOL/L (ref 21–32)
CREAT SERPL-MCNC: 0.7 MG/DL (ref 0.8–1.3)
GFR AFRICAN AMERICAN: >60
GFR NON-AFRICAN AMERICAN: >60
GLUCOSE BLD-MCNC: 198 MG/DL (ref 70–99)
HCT VFR BLD CALC: 40.3 % (ref 40.5–52.5)
HEMOGLOBIN: 13 G/DL (ref 13.5–17.5)
LACTIC ACID: 1.3 MMOL/L (ref 0.4–2)
MCH RBC QN AUTO: 30 PG (ref 26–34)
MCHC RBC AUTO-ENTMCNC: 32.3 G/DL (ref 31–36)
MCV RBC AUTO: 93 FL (ref 80–100)
PDW BLD-RTO: 15.2 % (ref 12.4–15.4)
PLATELET # BLD: 159 K/UL (ref 135–450)
PMV BLD AUTO: 10 FL (ref 5–10.5)
POTASSIUM REFLEX MAGNESIUM: 4.8 MMOL/L (ref 3.5–5.1)
RAPID INFLUENZA  B AGN: NEGATIVE
RAPID INFLUENZA A AGN: NEGATIVE
RBC # BLD: 4.33 M/UL (ref 4.2–5.9)
SODIUM BLD-SCNC: 139 MMOL/L (ref 136–145)
WBC # BLD: 12.2 K/UL (ref 4–11)

## 2020-01-29 PROCEDURE — 85027 COMPLETE CBC AUTOMATED: CPT

## 2020-01-29 PROCEDURE — G8484 FLU IMMUNIZE NO ADMIN: HCPCS | Performed by: INTERNAL MEDICINE

## 2020-01-29 PROCEDURE — G8417 CALC BMI ABV UP PARAM F/U: HCPCS | Performed by: INTERNAL MEDICINE

## 2020-01-29 PROCEDURE — 83605 ASSAY OF LACTIC ACID: CPT

## 2020-01-29 PROCEDURE — 1036F TOBACCO NON-USER: CPT | Performed by: INTERNAL MEDICINE

## 2020-01-29 PROCEDURE — 4040F PNEUMOC VAC/ADMIN/RCVD: CPT | Performed by: INTERNAL MEDICINE

## 2020-01-29 PROCEDURE — 87449 NOS EACH ORGANISM AG IA: CPT

## 2020-01-29 PROCEDURE — 1123F ACP DISCUSS/DSCN MKR DOCD: CPT | Performed by: INTERNAL MEDICINE

## 2020-01-29 PROCEDURE — G8926 SPIRO NO PERF OR DOC: HCPCS | Performed by: INTERNAL MEDICINE

## 2020-01-29 PROCEDURE — 87040 BLOOD CULTURE FOR BACTERIA: CPT

## 2020-01-29 PROCEDURE — 87205 SMEAR GRAM STAIN: CPT

## 2020-01-29 PROCEDURE — 2580000003 HC RX 258: Performed by: HOSPITALIST

## 2020-01-29 PROCEDURE — G8427 DOCREV CUR MEDS BY ELIG CLIN: HCPCS | Performed by: INTERNAL MEDICINE

## 2020-01-29 PROCEDURE — 80048 BASIC METABOLIC PNL TOTAL CA: CPT

## 2020-01-29 PROCEDURE — 6360000002 HC RX W HCPCS: Performed by: HOSPITALIST

## 2020-01-29 PROCEDURE — 94640 AIRWAY INHALATION TREATMENT: CPT

## 2020-01-29 PROCEDURE — 6370000000 HC RX 637 (ALT 250 FOR IP): Performed by: HOSPITALIST

## 2020-01-29 PROCEDURE — 36415 COLL VENOUS BLD VENIPUNCTURE: CPT

## 2020-01-29 PROCEDURE — 99214 OFFICE O/P EST MOD 30 MIN: CPT | Performed by: INTERNAL MEDICINE

## 2020-01-29 PROCEDURE — 87804 INFLUENZA ASSAY W/OPTIC: CPT

## 2020-01-29 PROCEDURE — 87070 CULTURE OTHR SPECIMN AEROBIC: CPT

## 2020-01-29 PROCEDURE — 2060000000 HC ICU INTERMEDIATE R&B

## 2020-01-29 PROCEDURE — 0100U HC RESPIRPTHGN MULT REV TRANS & AMP PRB TECH 21 TRGT: CPT

## 2020-01-29 PROCEDURE — 87077 CULTURE AEROBIC IDENTIFY: CPT

## 2020-01-29 PROCEDURE — 71250 CT THORAX DX C-: CPT

## 2020-01-29 PROCEDURE — 3023F SPIROM DOC REV: CPT | Performed by: INTERNAL MEDICINE

## 2020-01-29 RX ORDER — DILTIAZEM HYDROCHLORIDE 120 MG/1
120 CAPSULE, COATED, EXTENDED RELEASE ORAL DAILY
Status: DISCONTINUED | OUTPATIENT
Start: 2020-01-29 | End: 2020-02-01 | Stop reason: HOSPADM

## 2020-01-29 RX ORDER — SODIUM CHLORIDE 0.9 % (FLUSH) 0.9 %
10 SYRINGE (ML) INJECTION PRN
Status: DISCONTINUED | OUTPATIENT
Start: 2020-01-29 | End: 2020-02-01 | Stop reason: HOSPADM

## 2020-01-29 RX ORDER — ZINC GLUCONATE 50 MG
50 TABLET ORAL DAILY
Status: DISCONTINUED | OUTPATIENT
Start: 2020-01-29 | End: 2020-01-29

## 2020-01-29 RX ORDER — METHYLPREDNISOLONE SODIUM SUCCINATE 40 MG/ML
40 INJECTION, POWDER, LYOPHILIZED, FOR SOLUTION INTRAMUSCULAR; INTRAVENOUS EVERY 12 HOURS
Status: DISCONTINUED | OUTPATIENT
Start: 2020-01-29 | End: 2020-02-01 | Stop reason: HOSPADM

## 2020-01-29 RX ORDER — ALLOPURINOL 100 MG/1
100 TABLET ORAL DAILY
Status: DISCONTINUED | OUTPATIENT
Start: 2020-01-30 | End: 2020-02-01 | Stop reason: HOSPADM

## 2020-01-29 RX ORDER — DEXTROSE MONOHYDRATE 50 MG/ML
100 INJECTION, SOLUTION INTRAVENOUS PRN
Status: DISCONTINUED | OUTPATIENT
Start: 2020-01-29 | End: 2020-02-01 | Stop reason: HOSPADM

## 2020-01-29 RX ORDER — GUAIFENESIN/DEXTROMETHORPHAN 100-10MG/5
5 SYRUP ORAL EVERY 4 HOURS PRN
Status: DISCONTINUED | OUTPATIENT
Start: 2020-01-29 | End: 2020-02-01 | Stop reason: HOSPADM

## 2020-01-29 RX ORDER — LISINOPRIL 5 MG/1
5 TABLET ORAL DAILY
Status: DISCONTINUED | OUTPATIENT
Start: 2020-01-30 | End: 2020-02-01 | Stop reason: HOSPADM

## 2020-01-29 RX ORDER — AZITHROMYCIN 250 MG/1
250 TABLET, FILM COATED ORAL DAILY
Status: DISCONTINUED | OUTPATIENT
Start: 2020-01-29 | End: 2020-01-31

## 2020-01-29 RX ORDER — NICOTINE POLACRILEX 4 MG
15 LOZENGE BUCCAL PRN
Status: DISCONTINUED | OUTPATIENT
Start: 2020-01-29 | End: 2020-02-01 | Stop reason: HOSPADM

## 2020-01-29 RX ORDER — ACETAMINOPHEN 325 MG/1
650 TABLET ORAL EVERY 4 HOURS PRN
Status: DISCONTINUED | OUTPATIENT
Start: 2020-01-29 | End: 2020-02-01 | Stop reason: HOSPADM

## 2020-01-29 RX ORDER — SODIUM CHLORIDE 0.9 % (FLUSH) 0.9 %
10 SYRINGE (ML) INJECTION EVERY 12 HOURS SCHEDULED
Status: DISCONTINUED | OUTPATIENT
Start: 2020-01-29 | End: 2020-02-01 | Stop reason: HOSPADM

## 2020-01-29 RX ORDER — IPRATROPIUM BROMIDE AND ALBUTEROL SULFATE 2.5; .5 MG/3ML; MG/3ML
1 SOLUTION RESPIRATORY (INHALATION)
Status: DISCONTINUED | OUTPATIENT
Start: 2020-01-29 | End: 2020-02-01 | Stop reason: HOSPADM

## 2020-01-29 RX ORDER — ROSUVASTATIN CALCIUM 10 MG/1
10 TABLET, COATED ORAL NIGHTLY
Status: DISCONTINUED | OUTPATIENT
Start: 2020-01-29 | End: 2020-02-01 | Stop reason: HOSPADM

## 2020-01-29 RX ORDER — INSULIN LISPRO 100 [IU]/ML
0-6 INJECTION, SOLUTION INTRAVENOUS; SUBCUTANEOUS
Status: DISCONTINUED | OUTPATIENT
Start: 2020-01-29 | End: 2020-01-30

## 2020-01-29 RX ORDER — ONDANSETRON 2 MG/ML
4 INJECTION INTRAMUSCULAR; INTRAVENOUS EVERY 6 HOURS PRN
Status: DISCONTINUED | OUTPATIENT
Start: 2020-01-29 | End: 2020-02-01 | Stop reason: HOSPADM

## 2020-01-29 RX ORDER — SIMVASTATIN 40 MG
20 TABLET ORAL NIGHTLY
Status: DISCONTINUED | OUTPATIENT
Start: 2020-01-29 | End: 2020-01-29

## 2020-01-29 RX ORDER — CLOPIDOGREL BISULFATE 75 MG/1
75 TABLET ORAL DAILY
Status: DISCONTINUED | OUTPATIENT
Start: 2020-01-30 | End: 2020-02-01 | Stop reason: HOSPADM

## 2020-01-29 RX ORDER — INSULIN LISPRO 100 [IU]/ML
0-3 INJECTION, SOLUTION INTRAVENOUS; SUBCUTANEOUS NIGHTLY
Status: DISCONTINUED | OUTPATIENT
Start: 2020-01-29 | End: 2020-01-30

## 2020-01-29 RX ORDER — DEXTROSE MONOHYDRATE 25 G/50ML
12.5 INJECTION, SOLUTION INTRAVENOUS PRN
Status: DISCONTINUED | OUTPATIENT
Start: 2020-01-29 | End: 2020-02-01 | Stop reason: HOSPADM

## 2020-01-29 RX ADMIN — AZITHROMYCIN MONOHYDRATE 250 MG: 250 TABLET ORAL at 22:12

## 2020-01-29 RX ADMIN — METOPROLOL TARTRATE 25 MG: 25 TABLET, FILM COATED ORAL at 22:14

## 2020-01-29 RX ADMIN — INSULIN LISPRO 1 UNITS: 100 INJECTION, SOLUTION INTRAVENOUS; SUBCUTANEOUS at 22:11

## 2020-01-29 RX ADMIN — Medication 2 PUFF: at 20:44

## 2020-01-29 RX ADMIN — IPRATROPIUM BROMIDE AND ALBUTEROL SULFATE 1 AMPULE: .5; 3 SOLUTION RESPIRATORY (INHALATION) at 20:45

## 2020-01-29 RX ADMIN — DILTIAZEM HYDROCHLORIDE 120 MG: 120 CAPSULE, COATED, EXTENDED RELEASE ORAL at 22:13

## 2020-01-29 RX ADMIN — Medication 10 ML: at 22:15

## 2020-01-29 RX ADMIN — Medication 1 G: at 22:13

## 2020-01-29 RX ADMIN — ROSUVASTATIN CALCIUM 10 MG: 10 TABLET, FILM COATED ORAL at 22:15

## 2020-01-29 RX ADMIN — METHYLPREDNISOLONE SODIUM SUCCINATE 40 MG: 40 INJECTION, POWDER, FOR SOLUTION INTRAMUSCULAR; INTRAVENOUS at 22:14

## 2020-01-29 ASSESSMENT — PAIN SCALES - GENERAL: PAINLEVEL_OUTOF10: 0

## 2020-01-29 NOTE — H&P
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 Medications:  No current facility-administered medications on file prior to encounter. Current Outpatient Medications on File Prior to Encounter   Medication Sig Dispense Refill    allopurinol (ZYLOPRIM) 100 MG tablet TAKE ONE TABLET BY MOUTH DAILY 90 tablet 3    clopidogrel (PLAVIX) 75 MG tablet Take 1 tablet by mouth daily 90 tablet 3    lisinopril (PRINIVIL;ZESTRIL) 5 MG tablet Take 1 tablet by mouth daily 90 tablet 3    alendronate (FOSAMAX) 70 MG tablet Take 1 tablet by mouth every 7 days 12 tablet 3    albuterol (PROVENTIL) (2.5 MG/3ML) 0.083% nebulizer solution Take 3 mLs by nebulization every 6 hours as needed for Wheezing DX J44.9, J43.2, J84.10 360 each 3    budesonide-formoterol (SYMBICORT) 160-4.5 MCG/ACT AERO Inhale 2 puffs into the lungs 2 times daily 3 Inhaler 3    predniSONE (DELTASONE) 10 MG tablet Take 1 tablet by mouth daily 30 tablet 11    albuterol sulfate HFA (PROAIR HFA) 108 (90 Base) MCG/ACT inhaler Inhale 2 puffs into the lungs daily as needed for Shortness of Breath 3 Inhaler 3    tiotropium (SPIRIVA HANDIHALER) 18 MCG inhalation capsule Inhale 1 capsule into the lungs daily 90 capsule 3    metoprolol tartrate (LOPRESSOR) 25 MG tablet Take 1 tablet by mouth 2 times daily 180 tablet 4    diltiazem (CARDIZEM CD) 120 MG extended release capsule Take 1 capsule by mouth daily 30 capsule 12    simvastatin (ZOCOR) 20 MG tablet Take 20 mg by mouth nightly      ONETOUCH VERIO strip USE TO TEST DAILY 50 strip 11    calcium carbonate 600 MG TABS tablet Take 1 tablet by mouth daily       ONETOUCH DELICA LANCETS 46K MISC Use to check blood sugar once daily. E11.9 100 each 12    Selenium 200 MCG CAPS Take  by mouth daily.  Zinc 50 MG TABS Take 50 mg by mouth daily.  Ascorbic Acid (VITAMIN C) 500 MG tablet Take 500 mg by mouth daily.         vitamin D (ERGOCALCIFEROL) 45475 UNIT CAPS capsule Take 2,000 Units by mouth Daily.  Cyanocobalamin (VITAMIN B 12 PO) Take 500 mcg by mouth daily at 1800. Allergies: Allergies   Allergen Reactions    Aspirin Hives and Swelling    Dilaudid [Hydromorphone Hcl] Other (See Comments)     Severe hallucination        Social History:   reports that he quit smoking about 19 years ago. He has a 100.00 pack-year smoking history. He has never used smokeless tobacco. He reports current alcohol use of about 8.0 standard drinks of alcohol per week. He reports that he does not use drugs. Family History:  family history includes Alcohol Abuse in his father; Cancer in his brother and brother; Ranjith Gums in his brother, sister, and sister; Early Death (age of onset: 43) in his mother; Emphysema in his brother; Kidney Disease in his brother; Mora Maudlin in his brother; Other in his brother. ,     Physical Exam:  There were no vitals taken for this visit. General appearance:  Appears comfortable. Well nourished  Eyes: Sclera clear, pupils equal  ENT: Moist mucus membranes, no thrush. Trachea midline. Cardiovascular: Regular rhythm, normal S1, S2. No murmur, gallop, rub. No edema in lower extremities  Respiratory: Diffuse wheezing bilaterally decreased at base gastrointestinal: Abdomen soft, non-tender, not distended, normal bowel sounds  Musculoskeletal: No cyanosis in digits, neck supple  Neurology: Cranial nerves grossly intact. Alert and oriented in time, place and person. No speech or motor deficits  Psychiatry: Appropriate affect.  Not agitated  Skin: Warm, dry, normal turgor, no rash    Labs:  CBC:   Lab Results   Component Value Date    WBC 10.3 11/15/2019    RBC 4.57 11/15/2019    HGB 13.7 11/15/2019    HCT 41.9 11/15/2019    MCV 91.7 11/15/2019    MCH 30.0 11/15/2019    MCHC 32.8 11/15/2019    RDW 15.9 11/15/2019     11/15/2019    MPV 9.9 11/15/2019     BMP:    Lab Results   Component Value Date    NA

## 2020-01-29 NOTE — PROGRESS NOTES
[facility] [place]  Progress Note  Mayra Chawla MD       Dave Wei   YOB: 1932    Date of Visit:  1/29/2020    Assessment/Plan:  He looks sick and is SOB at rest  Now requiring up to 8 LPM (normal is around 5)  Failed OPT treatment  I think he should be admitted. 1. COPD, severe  Stable  PFT 2/18:  INTERPRETATION:  Spirometry attempts were acceptable and reproducible. FVC  was normal at 3.48 liters, 119% predicted and normal FEV1 of 2.02 liters,  101% predicted. FEV1/FVC ratio was decreased at 58%. Lung volumes showed  normal total lung capacity of 112% predicted. Diffusion capacity showed  decreased DLCO of 48% predicted.     IMPRESSION:  Mild obstructive defect on spirometry with normal lung volumes  but moderate-to-severe decrease in diffusion capacity. In comparison to  the test that was done in 01/2016, no significant change in FVC or FEV1. Total lung capacity has improved by 9% and DLCO decreased by 10%. Mild by PFT but severe by Symptoms    Symbicort  Spiriva  HHN QAM and prn  Albuterol HFA in advance of activity  Mucinex  Prednisone 10 mg per day. Seems to be Prednisone dependent. Discussed long term complications of steroid therapy. Will continue level dose of Prednisone 10 per day. 2. Centrilobular emphysema (HCC)  Stable  CT Chest:8/18    Impression   No significant change in fibrosis which is inconsistent with UIP pattern due   to extensive ground-glass component.  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       Slight interval increase in size of the 8 mm nodule in the left lower lobe. This has been present for greater than 2 years, though consider continued   follow-up imaging given slight increase.       Unchanged severe emphysema.        3. Pulmonary fibrosis ([address]  Stable  CT Chest: 8/18:  Impression   No significant change in fibrosis which is inconsistent with UIP pattern due   to extensive ground-glass component.  Findings

## 2020-01-30 LAB
GLUCOSE BLD-MCNC: 169 MG/DL (ref 70–99)
GLUCOSE BLD-MCNC: 250 MG/DL (ref 70–99)
GLUCOSE BLD-MCNC: 272 MG/DL (ref 70–99)
GLUCOSE BLD-MCNC: 276 MG/DL (ref 70–99)
L. PNEUMOPHILA SEROGP 1 UR AG: NORMAL
PERFORMED ON: ABNORMAL
PROCALCITONIN: 0.1 NG/ML (ref 0–0.15)
STREP PNEUMONIAE ANTIGEN, URINE: NORMAL

## 2020-01-30 PROCEDURE — 2060000000 HC ICU INTERMEDIATE R&B

## 2020-01-30 PROCEDURE — 94761 N-INVAS EAR/PLS OXIMETRY MLT: CPT

## 2020-01-30 PROCEDURE — 6360000002 HC RX W HCPCS: Performed by: HOSPITALIST

## 2020-01-30 PROCEDURE — 84145 PROCALCITONIN (PCT): CPT

## 2020-01-30 PROCEDURE — 99223 1ST HOSP IP/OBS HIGH 75: CPT | Performed by: INTERNAL MEDICINE

## 2020-01-30 PROCEDURE — 92526 ORAL FUNCTION THERAPY: CPT

## 2020-01-30 PROCEDURE — 2700000000 HC OXYGEN THERAPY PER DAY

## 2020-01-30 PROCEDURE — 92610 EVALUATE SWALLOWING FUNCTION: CPT

## 2020-01-30 PROCEDURE — 6370000000 HC RX 637 (ALT 250 FOR IP): Performed by: HOSPITALIST

## 2020-01-30 PROCEDURE — 2580000003 HC RX 258: Performed by: HOSPITALIST

## 2020-01-30 PROCEDURE — 94640 AIRWAY INHALATION TREATMENT: CPT

## 2020-01-30 PROCEDURE — 94760 N-INVAS EAR/PLS OXIMETRY 1: CPT

## 2020-01-30 PROCEDURE — 36415 COLL VENOUS BLD VENIPUNCTURE: CPT

## 2020-01-30 RX ORDER — INSULIN LISPRO 100 [IU]/ML
0-12 INJECTION, SOLUTION INTRAVENOUS; SUBCUTANEOUS
Status: DISCONTINUED | OUTPATIENT
Start: 2020-01-30 | End: 2020-01-31

## 2020-01-30 RX ORDER — INSULIN LISPRO 100 [IU]/ML
0-6 INJECTION, SOLUTION INTRAVENOUS; SUBCUTANEOUS NIGHTLY
Status: DISCONTINUED | OUTPATIENT
Start: 2020-01-30 | End: 2020-01-31

## 2020-01-30 RX ADMIN — ENOXAPARIN SODIUM 40 MG: 40 INJECTION SUBCUTANEOUS at 08:35

## 2020-01-30 RX ADMIN — INSULIN LISPRO 2 UNITS: 100 INJECTION, SOLUTION INTRAVENOUS; SUBCUTANEOUS at 20:42

## 2020-01-30 RX ADMIN — INSULIN LISPRO 6 UNITS: 100 INJECTION, SOLUTION INTRAVENOUS; SUBCUTANEOUS at 17:10

## 2020-01-30 RX ADMIN — IPRATROPIUM BROMIDE AND ALBUTEROL SULFATE 1 AMPULE: .5; 3 SOLUTION RESPIRATORY (INHALATION) at 08:29

## 2020-01-30 RX ADMIN — ROSUVASTATIN CALCIUM 10 MG: 10 TABLET, FILM COATED ORAL at 20:43

## 2020-01-30 RX ADMIN — AZITHROMYCIN MONOHYDRATE 250 MG: 250 TABLET ORAL at 08:34

## 2020-01-30 RX ADMIN — IPRATROPIUM BROMIDE AND ALBUTEROL SULFATE 1 AMPULE: .5; 3 SOLUTION RESPIRATORY (INHALATION) at 21:45

## 2020-01-30 RX ADMIN — ALLOPURINOL 100 MG: 100 TABLET ORAL at 08:34

## 2020-01-30 RX ADMIN — DILTIAZEM HYDROCHLORIDE 120 MG: 120 CAPSULE, COATED, EXTENDED RELEASE ORAL at 08:34

## 2020-01-30 RX ADMIN — METHYLPREDNISOLONE SODIUM SUCCINATE 40 MG: 40 INJECTION, POWDER, FOR SOLUTION INTRAMUSCULAR; INTRAVENOUS at 17:10

## 2020-01-30 RX ADMIN — IPRATROPIUM BROMIDE AND ALBUTEROL SULFATE 1 AMPULE: .5; 3 SOLUTION RESPIRATORY (INHALATION) at 16:57

## 2020-01-30 RX ADMIN — LISINOPRIL 5 MG: 5 TABLET ORAL at 08:34

## 2020-01-30 RX ADMIN — IPRATROPIUM BROMIDE AND ALBUTEROL SULFATE 1 AMPULE: .5; 3 SOLUTION RESPIRATORY (INHALATION) at 13:23

## 2020-01-30 RX ADMIN — METHYLPREDNISOLONE SODIUM SUCCINATE 40 MG: 40 INJECTION, POWDER, FOR SOLUTION INTRAMUSCULAR; INTRAVENOUS at 05:34

## 2020-01-30 RX ADMIN — CLOPIDOGREL 75 MG: 75 TABLET, FILM COATED ORAL at 08:34

## 2020-01-30 RX ADMIN — METOPROLOL TARTRATE 25 MG: 25 TABLET, FILM COATED ORAL at 20:43

## 2020-01-30 RX ADMIN — Medication 2 PUFF: at 21:46

## 2020-01-30 RX ADMIN — Medication 10 ML: at 08:36

## 2020-01-30 RX ADMIN — METOPROLOL TARTRATE 25 MG: 25 TABLET, FILM COATED ORAL at 08:34

## 2020-01-30 RX ADMIN — Medication 1 G: at 20:43

## 2020-01-30 RX ADMIN — Medication 2 PUFF: at 08:29

## 2020-01-30 RX ADMIN — INSULIN LISPRO 6 UNITS: 100 INJECTION, SOLUTION INTRAVENOUS; SUBCUTANEOUS at 12:02

## 2020-01-30 RX ADMIN — Medication 10 ML: at 20:53

## 2020-01-30 RX ADMIN — INSULIN LISPRO 1 UNITS: 100 INJECTION, SOLUTION INTRAVENOUS; SUBCUTANEOUS at 08:46

## 2020-01-30 ASSESSMENT — PAIN SCALES - GENERAL
PAINLEVEL_OUTOF10: 0

## 2020-01-30 NOTE — CONSULTS
tobacco.  ETOH:   reports current alcohol use of about 8.0 standard drinks of alcohol per week. Patient currently lives independently    Family History:       Problem Relation Age of Onset    Early Death Mother 43        ?  MI    Alcohol Abuse Father     Colon Cancer Sister     Emphysema Brother         black lung    Colon Cancer Sister     Cancer Brother         stomach    Cancer Brother         liver    Lung Cancer Brother     Colon Cancer Brother     Kidney Disease Brother     Other Brother         sydenham chorea       REVIEW OF SYSTEMS:    Constitutional: negative for fatigue, fevers, malaise and weight loss  Ears, nose, mouth, throat: negative for ear drainage, epistaxis, hoarseness, nasal congestion, sore throat and voice change  Respiratory: negative except for cough, dyspnea on exertion, shortness of breath, sputum and wheezing  Cardiovascular: negative for chest pain, chest pressure/discomfort, irregular heart beat, lower extremity edema and palpitations  Gastrointestinal: negative for abdominal pain, constipation, diarrhea, jaundice, melena, odynophagia, reflux symptoms and vomiting  Hematologic/lymphatic: negative for bleeding, easy bruising, lymphadenopathy and petechiae  Musculoskeletal:negative for arthralgias, bone pain, muscle weakness, neck pain and stiff joints  Neurological: negative for dizziness, gait problems, headaches, seizures, speech problems, tremors and weakness  Behavioral/Psych: negative for anxiety, behavior problems, depression, fatigue and sleep disturbance  Endocrine: negative for diabetic symptoms including none, neuropathy, polyphagia, polyuria, polydipsia, vomiting and diarrhea and temperature intolerance  Allergic/Immunologic: negative for anaphylaxis, angioedema, hay fever and urticaria      Objective:     Patient Vitals for the past 8 hrs:   BP Temp Temp src Pulse Resp SpO2 Weight   01/30/20 0849 -- -- -- -- 18 94 % --   01/30/20 0847 -- -- -- 75 18 94 % -- 01/30/20 0830 114/70 97.9 °F (36.6 °C) Oral 75 22 95 % --   01/30/20 0736 -- -- -- -- -- -- 185 lb 8 oz (84.1 kg)   01/30/20 0500 -- 94.5 °F (34.7 °C) Axillary 65 22 97 % --     No intake/output data recorded. I/O this shift:  In: -   Out: 250 [Urine:250]    Physical Exam:  General Appearance: alert and oriented to person, place and time, well developed and well- nourished, increased work of breathing  Skin: warm and dry, no rash or erythema  Head: normocephalic and atraumatic  Eyes: pupils equal, round, and reactive to light, extraocular eye movements intact, conjunctivae normal  ENT: external ear and ear canal normal bilaterally, nose without deformity, nasal mucosa and turbinates normal  Neck: supple and non-tender without mass, no cervical lymphadenopathy  Pulmonary/Chest: rales present-bilateral, scattered, no wheezes heard  Cardiovascular: normal rate, regular rhythm,  no murmurs, rubs, distal pulses intact, no carotid bruits  Abdomen: soft, non-tender, non-distended, normal bowel sounds, no masses or organomegaly  Lymph Nodes: Cervical, supraclavicular normal  Extremities: no cyanosis, clubbing or edema  Musculoskeletal: normal range of motion, no joint swelling, deformity or tenderness  Neurologic: alert, no focal neurologic deficits    Data Review:  CBC:   Lab Results   Component Value Date    WBC 12.2 01/29/2020    RBC 4.33 01/29/2020     BMP:   Lab Results   Component Value Date    GLUCOSE 198 01/29/2020    CO2 29 01/29/2020    BUN 15 01/29/2020    CREATININE 0.7 01/29/2020    CALCIUM 9.2 01/29/2020     ABG:   Lab Results   Component Value Date    FNH0QCT 22.1 02/05/2014    BEART -1.9 02/05/2014    X8AJMNDD 94.3 02/05/2014    PHART 7.411 02/05/2014    CGK1IMC 35.6 02/05/2014    PO2ART 70.1 02/05/2014    WAA1FSC 23.2 02/05/2014       Radiology: All pertinent images / reports were reviewed as a part of this visit.     Narrative   EXAMINATION:   CT OF THE CHEST WITHOUT CONTRAST       1/29/2020 6:06 pm     of the lingula and left   lower lobe.  Suboptimal visualization of similar appearing subpleural   groundglass and less prominent reticulations near the bases.  Persistent   trace loculated left pleural effusion and associated left pleural thickening. No pneumothoraces nor right pleural effusion.  Unchanged calcified pleural   plaques in the base of the right hemithorax.       UPPER ABDOMEN:  Normal appearance of the included upper abdominal organs.       SOFT TISSUES/BONES: No supraclavicular nor axillary lymphadenopathy.  No   acute fractures nor suspicious osseous lesions.           Impression   1. Mild to moderate bronchial wall thickening with patchy airway secretions,   suggesting bronchitis or aspiration   2. New 2.1 cm x 1.6 cm solid nodule in the subpleural right upper lobe along   the right major fissure.  The primary considerations are focal pneumonia or   malignancy.  Recommend follow-up chest CT in 3 months as below consideration   for percutaneous biopsy.  PET would likely be hypermetabolic in the setting   of either etiology. 3. Moderate emphysema. 4. Suboptimal visualization of basilar predominant fibrotic changes due to   respiratory motion.  However, the appearance is not significantly changed. 5. Cardiovascular findings suggestive of pulmonary hypertension. Problem List:   Community-acquired pneumonia  NSIP/pulmonary hypertension  COPD, mild with acute exacerbation  Acute on chronic hypoxic respiratory failure  Assessment/Plan:     Complex case-patient's primary problem appears to be related to pulmonary fibrosis, which has NSIP appearance along with severe pulmonary hypertension as noted on prior 2D echocardiogram.  Significant hypoxia, worse with exertion. Currently on 7 L O2, which is close to baseline. Continue to use BiPAP at nighttime. Reviewed patient CT imaging which shows a right upper lobe 2.1 cm nodular opacity, which likely represents inflammation/infection.

## 2020-01-30 NOTE — PLAN OF CARE
Problem: Falls - Risk of:  Goal: Will remain free from falls  Description  Will remain free from falls  1/30/2020 1340 by Linda Arellano RN  Outcome: Ongoing  Note:   Pt remains free from falls. Safety precautions in place. Bed in lowest position, bed wheels locked, call light with in reach, bed alarm on, yellow blanket in place, fall risk wrist band on, SAFE outside of doorway. Will continue to monitor.    1/30/2020 0508 by Yousuf Li RN  Outcome: Ongoing  Goal: Absence of physical injury  Description  Absence of physical injury  1/30/2020 1340 by Linda Arellano RN  Outcome: Ongoing  1/30/2020 0508 by Yousuf Li RN  Outcome: Ongoing

## 2020-01-30 NOTE — PROGRESS NOTES
Assessment completed, see flowsheet for details. Respiratory: Rhonchi and inspiratory wheezing auscultated throughout the lungs. Patient in visible distress with minimal exertion. Currently on 5.5L high flow nasal cannula. Now resting comfortably in bed. GI: Bowel sounds are present in all quadrants. No N/V reported. Large torso/abdomen, barrel chest ?    Neuro: Pt A/O x4     Peripheral vascular: Pulses are palpable in all extremities. Skin: No evidence of new skin breakdown assessed during shift. Pain: Pt reported a 0 on a 0-10 scale. /60   Pulse 73   Temp 98 °F (36.7 °C) (Axillary)   Resp 20   Ht 5' 5\" (1.651 m)   Wt 185 lb (83.9 kg)   SpO2 95%   BMI 30.79 kg/m²      Call light within reach. Will continue to monitor.

## 2020-01-30 NOTE — PROGRESS NOTES
Pt on Home CPAP/BiPAP with preset home settings. 5.5 L  O2 bleed in. SpO2 in 97%. Will continue to monitor.

## 2020-01-30 NOTE — PROGRESS NOTES
[facility])  Resolved Problems:    * No resolved hospital problems. *       Assessment & Plan:   Acute on chronic hypoxic respiratory failure  -Likely multifactorial in setting of COPD underlying infection  -Is complex lung history.  -Still on 9 L  -Continue to wean continue empiric antibiotics for now  -PCR viral and other cultures are pending    1. Acute COPD exacerbation  - continue steroids,  - prn breathing treatment  - cough medication  - spirometry,  - home inhalers. - labs reviewed '  - monitor BG while getting steroid and adjust according. Chrnonic medication as reviwed above. Diet: DIET GENERAL; Mildly Thick (Nectar);  No Drinking Straw  Code:Full Code  DVT PPX lovenox       Nelly Valle MD   1/30/2020 1:08 PM

## 2020-01-31 ENCOUNTER — APPOINTMENT (OUTPATIENT)
Dept: GENERAL RADIOLOGY | Age: 85
DRG: 189 | End: 2020-01-31
Attending: INTERNAL MEDICINE
Payer: MEDICARE

## 2020-01-31 ENCOUNTER — TELEPHONE (OUTPATIENT)
Dept: PULMONOLOGY | Age: 85
End: 2020-01-31

## 2020-01-31 LAB
CULTURE, RESPIRATORY: ABNORMAL
CULTURE, RESPIRATORY: ABNORMAL
GLUCOSE BLD-MCNC: 194 MG/DL (ref 70–99)
GLUCOSE BLD-MCNC: 199 MG/DL (ref 70–99)
GLUCOSE BLD-MCNC: 213 MG/DL (ref 70–99)
GLUCOSE BLD-MCNC: 238 MG/DL (ref 70–99)
GRAM STAIN RESULT: ABNORMAL
ORGANISM: ABNORMAL
ORGANISM: ABNORMAL
PERFORMED ON: ABNORMAL
REPORT: NORMAL
RESPIRATORY PANEL PCR: ABNORMAL

## 2020-01-31 PROCEDURE — 6370000000 HC RX 637 (ALT 250 FOR IP): Performed by: HOSPITALIST

## 2020-01-31 PROCEDURE — 6360000002 HC RX W HCPCS: Performed by: HOSPITALIST

## 2020-01-31 PROCEDURE — 94761 N-INVAS EAR/PLS OXIMETRY MLT: CPT

## 2020-01-31 PROCEDURE — 92526 ORAL FUNCTION THERAPY: CPT

## 2020-01-31 PROCEDURE — 99232 SBSQ HOSP IP/OBS MODERATE 35: CPT | Performed by: INTERNAL MEDICINE

## 2020-01-31 PROCEDURE — 92611 MOTION FLUOROSCOPY/SWALLOW: CPT

## 2020-01-31 PROCEDURE — 2700000000 HC OXYGEN THERAPY PER DAY

## 2020-01-31 PROCEDURE — 2060000000 HC ICU INTERMEDIATE R&B

## 2020-01-31 PROCEDURE — 2580000003 HC RX 258: Performed by: HOSPITALIST

## 2020-01-31 PROCEDURE — 94640 AIRWAY INHALATION TREATMENT: CPT

## 2020-01-31 PROCEDURE — 74230 X-RAY XM SWLNG FUNCJ C+: CPT

## 2020-01-31 RX ORDER — INSULIN LISPRO 100 [IU]/ML
0-18 INJECTION, SOLUTION INTRAVENOUS; SUBCUTANEOUS
Status: DISCONTINUED | OUTPATIENT
Start: 2020-01-31 | End: 2020-02-01 | Stop reason: HOSPADM

## 2020-01-31 RX ORDER — INSULIN LISPRO 100 [IU]/ML
0-9 INJECTION, SOLUTION INTRAVENOUS; SUBCUTANEOUS NIGHTLY
Status: DISCONTINUED | OUTPATIENT
Start: 2020-01-31 | End: 2020-02-01 | Stop reason: HOSPADM

## 2020-01-31 RX ADMIN — ALLOPURINOL 100 MG: 100 TABLET ORAL at 09:00

## 2020-01-31 RX ADMIN — METOPROLOL TARTRATE 25 MG: 25 TABLET, FILM COATED ORAL at 09:00

## 2020-01-31 RX ADMIN — Medication 2 PUFF: at 20:42

## 2020-01-31 RX ADMIN — METHYLPREDNISOLONE SODIUM SUCCINATE 40 MG: 40 INJECTION, POWDER, FOR SOLUTION INTRAMUSCULAR; INTRAVENOUS at 05:08

## 2020-01-31 RX ADMIN — NYSTATIN 500000 UNITS: 100000 SUSPENSION ORAL at 22:04

## 2020-01-31 RX ADMIN — Medication 10 ML: at 22:05

## 2020-01-31 RX ADMIN — Medication 2 PUFF: at 10:08

## 2020-01-31 RX ADMIN — LISINOPRIL 5 MG: 5 TABLET ORAL at 09:00

## 2020-01-31 RX ADMIN — ROSUVASTATIN CALCIUM 10 MG: 10 TABLET, FILM COATED ORAL at 22:05

## 2020-01-31 RX ADMIN — IPRATROPIUM BROMIDE AND ALBUTEROL SULFATE 1 AMPULE: .5; 3 SOLUTION RESPIRATORY (INHALATION) at 16:01

## 2020-01-31 RX ADMIN — INSULIN LISPRO 3 UNITS: 100 INJECTION, SOLUTION INTRAVENOUS; SUBCUTANEOUS at 21:59

## 2020-01-31 RX ADMIN — IPRATROPIUM BROMIDE AND ALBUTEROL SULFATE 1 AMPULE: .5; 3 SOLUTION RESPIRATORY (INHALATION) at 11:26

## 2020-01-31 RX ADMIN — INSULIN LISPRO 3 UNITS: 100 INJECTION, SOLUTION INTRAVENOUS; SUBCUTANEOUS at 13:02

## 2020-01-31 RX ADMIN — INSULIN LISPRO 3 UNITS: 100 INJECTION, SOLUTION INTRAVENOUS; SUBCUTANEOUS at 18:31

## 2020-01-31 RX ADMIN — METOPROLOL TARTRATE 25 MG: 25 TABLET, FILM COATED ORAL at 22:04

## 2020-01-31 RX ADMIN — NYSTATIN 500000 UNITS: 100000 SUSPENSION ORAL at 18:32

## 2020-01-31 RX ADMIN — AZITHROMYCIN MONOHYDRATE 250 MG: 250 TABLET ORAL at 09:00

## 2020-01-31 RX ADMIN — Medication 10 ML: at 09:08

## 2020-01-31 RX ADMIN — DILTIAZEM HYDROCHLORIDE 120 MG: 120 CAPSULE, COATED, EXTENDED RELEASE ORAL at 09:01

## 2020-01-31 RX ADMIN — CLOPIDOGREL 75 MG: 75 TABLET, FILM COATED ORAL at 09:00

## 2020-01-31 RX ADMIN — IPRATROPIUM BROMIDE AND ALBUTEROL SULFATE 1 AMPULE: .5; 3 SOLUTION RESPIRATORY (INHALATION) at 20:42

## 2020-01-31 RX ADMIN — INSULIN LISPRO 4 UNITS: 100 INJECTION, SOLUTION INTRAVENOUS; SUBCUTANEOUS at 08:59

## 2020-01-31 RX ADMIN — ENOXAPARIN SODIUM 40 MG: 40 INJECTION SUBCUTANEOUS at 09:08

## 2020-01-31 RX ADMIN — METHYLPREDNISOLONE SODIUM SUCCINATE 40 MG: 40 INJECTION, POWDER, FOR SOLUTION INTRAMUSCULAR; INTRAVENOUS at 22:04

## 2020-01-31 ASSESSMENT — PAIN SCALES - GENERAL
PAINLEVEL_OUTOF10: 0

## 2020-01-31 NOTE — PLAN OF CARE
Problem: Falls - Risk of:  Goal: Will remain free from falls  Description  Will remain free from falls  Outcome: Ongoing  All needed fall precautions in place, pt is medium fall risk     Problem: OXYGENATION/RESPIRATORY FUNCTION  Goal: Patient will maintain patent airway  Outcome: Ongoing  Pt respiratory status close to baseline pt on 6L high flow.

## 2020-01-31 NOTE — PROGRESS NOTES
112/61   Pulse 79   Temp 98 °F (36.7 °C) (Oral)   Resp 18   Ht 5' 5\" (1.651 m)   Wt 185 lb 6.4 oz (84.1 kg)   SpO2 96%   BMI 30.85 kg/m²     Intake/Output Summary (Last 24 hours) at 1/31/2020 1052  Last data filed at 1/31/2020 0908  Gross per 24 hour   Intake 570 ml   Output 250 ml   Net 320 ml      Wt Readings from Last 3 Encounters:   01/31/20 185 lb 6.4 oz (84.1 kg)   12/10/19 192 lb (87.1 kg)   08/30/19 187 lb 8 oz (85 kg)       General appearance:  Appears comfortable  Eyes: Sclera clear. Pupils equal.  ENT: Moist oral mucosa. Trachea midline, no adenopathy. Cardiovascular: Regular rhythm, normal S1, S2. No murmur. No edema in lower extremities  Respiratory: noted for wheezing and some tachypnea when asked to exert. No rales. No crackles ella crackles. GI: Abdomen soft, no tenderness, not distended, normal bowel sounds  Musculoskeletal: No cyanosis in digits, neck supple  Neurology: CN 2-12 grossly intact. No speech or motor deficits  Psych: Normal affect. Alert and oriented in time, place and person  Skin: Warm, dry, normal turgor    Labs and Tests:  CBC:   Recent Labs     01/29/20 2018   WBC 12.2*   HGB 13.0*        BMP:    Recent Labs     01/29/20 2018      K 4.8   CL 99   CO2 29   BUN 15   CREATININE 0.7*   GLUCOSE 198*     Hepatic: No results for input(s): AST, ALT, ALB, BILITOT, ALKPHOS in the last 72 hours.     Discussed care with family and patient             Spent 30  minutes with patient and family at bedside and on unit reviewing medical records and labs, spent greater than 50% time counseling patient and family on diagnosis and plan   Problem List  Principal Problem:    Acute on chronic respiratory failure with hypoxia ([address]  Active Problems:    Pulmonary fibrosis ([address]    Obstructive sleep apnea    HTN (hypertension)    Coronary artery disease involving native coronary artery of native heart without angina pectoris    Diabetes mellitus ([address]    SVT (supraventricular

## 2020-01-31 NOTE — PROGRESS NOTES
Droplet and contact isolation initiated.     Electronically signed by Amrit Sawyer RN on 1/31/2020 at 8:31 AM

## 2020-01-31 NOTE — TELEPHONE ENCOUNTER
----- Message from Samy Demarco MD sent at 1/31/2020 11:32 AM EST -----  Pt needs follow up with Dulce Neumann or Dr Jody Sibley next week, post hospitalization.

## 2020-01-31 NOTE — PROCEDURES
[facility] SPEECH THERAPY  MODIFIED BARIUM SWALLOW EVALUATION    Patient's Name: Holley Orozco. O.B: 10/3/1932  Medical Diagnosis: Acute on chronic respiratory failure with hypoxia (HCC) [J96.21]  Treatment Diagnosis: Dysphagia  Date of Evaluation: 1/31/2020  Type of Study: Modified Barium Swallowing Study (MBS)  Diet Prior to Study: Regular texture diet with Mildly Thick (Nectar) Liquids, no straws, meds in puree or with mildly thick liquid  Pain Level: Pt did not report pain    Impression:  Modified Barium Swallow evaluation completed on 1/31/2020. Results indicate mild-moderate oropharyngeal dysphagia characterized by laryngeal penetration with thin liquids, nectar thick liquids, and honey thick liquids with no aspiration viewed during study. Thin liquids via cup revealed premature spillage over tip of epiglottis to pyriforms, delayed swallow initiation was noted with laryngeal penetration viewed during the swallow. Laryngeal penetration was largely self-clearing with no aspiration viewed. After soft solids were introduced, deeper laryngeal penetration was noted with thin liquid trials. A chin tuck was trialed, however this was ineffective in clearing penetrated material.  Nectar thick liquids revealed mild improvement in swallow timing, however laryngeal penetration was viewed during the swallow. Mild vallecular residue was present post swallow with nectar thick liquids. Honey thick liquids also revealed intermittent laryngeal penetration during the swallow. Soft and regular solid textures revealed prolonged mastication, effective oral and pharyngeal clearing was noted. Although no aspiration was viewed with any texture during study, Pt is at increased risk due to delayed swallow initiation and reduced airway protection, recommend strict use of aspiration precautions.      Aspiration/Penetration Risk:  Mild-Moderate risk with thin and nectar thick liquids  Mild risk with honey thick liquids    Penetration-Aspiration Scale (PAS)  2 - Material enters the airway, remains above the vocal folds, and is ejected from the airway    Recommendations:    Diet Level:   1.) Regular texture diet with thin liquids, no straws, medication whole in puree, No mixed consistencies (ie. Chicken noodle soup; cereal with milk; meds with water, etc)   2.) If respiratory status declines, consider downgrade to Moderately Thick (honey) Liquids      Treatments: Recommend Speech Therapy for dysphagia treatment (home health or outpatient)    Strategies: Upright 90 degrees at meals; No Straws;  Meds with puree; Small bites/sips  Goals:  1)Pt will tolerate diet with no signs or symptoms of aspiration   2) Pt will improve Oral Motor/ Bolus Control via exercises to 5/5 each   3)Pt will improve sensory/motor reflexive responses via DPNS    Consistencies given: Thin, Mildly Thick (Nectar) Liquids, Moderately Thick (honey) Liquids, Puree, Soft solid, Solid    Oral Phase  -Reduced bolus control  -Premature bolus loss to pharynx  -Prolonged mastication  -Decreased tongue base retraction    Pharyngeal Phase  -Delayed swallow onest  -Pooling to the pyriforms with thin and nectar thick liquids  -Pooling to the vallecula with honey thick liquids  -Delayed epiglottic retraction  -Decreased hyolaryngeal excursion  -Reduced airway protection  -Laryngeal penetration with thin and nectar thick liquids during the swallow, largely self-clearing  -Intermittent laryngeal penetration with honey thick liquids  -No aspiration was viewed with any texture during study    Esophageal Phase  Unremarkable    Following Evaluation:  Results/recommendations and education given to Patient, Pt's wife, and nurse, who verbalized understanding    Timed Code Treatment: 0 minutes    Total Treatment Time: 35 minutes    Brent Hoang., [address] [place] [address]  Speech-Language Pathologist

## 2020-01-31 NOTE — PROGRESS NOTES
pain and stiff joints  Neurological: negative for dizziness, gait problems, headaches, seizures, speech problems, tremors and weakness  Behavioral/Psych: negative for anxiety, behavior problems, depression, fatigue and sleep disturbance  Endocrine: negative for diabetic symptoms including none, neuropathy, polyphagia, polyuria, polydipsia, vomiting and diarrhea and temperature intolerance  Allergic/Immunologic: negative for anaphylaxis, angioedema, hay fever and urticaria    Objective:     Patient Vitals for the past 8 hrs:   BP Temp Temp src Pulse Resp SpO2 Weight   01/31/20 0813 112/61 98 °F (36.7 °C) Oral 79 18 96 % --   01/31/20 0741 -- -- -- -- -- -- 185 lb 6.4 oz (84.1 kg)   01/31/20 0415 104/63 96 °F (35.6 °C) Oral 65 18 -- --     I/O last 3 completed shifts:   In: 12 [P.O.:560]  Out: 500 [Urine:500]  I/O this shift:  In: 10 [I.V.:10]  Out: -     General Appearance: alert and oriented to person, place and time, well developed and well- nourished, in no acute distress  Skin: warm and dry, no rash or erythema  Head: normocephalic and atraumatic  Eyes: pupils equal, round, and reactive to light, extraocular eye movements intact, conjunctivae normal  ENT: external ear and ear canal normal bilaterally, nose without deformity, nasal mucosa and turbinates normal  Neck: supple and non-tender without mass, no cervical lymphadenopathy  Pulmonary/Chest: rales present-scattered, bilateral  Cardiovascular: normal rate, regular rhythm,  no murmurs, rubs, distal pulses intact, no carotid bruits  Abdomen: soft, non-tender, non-distended, normal bowel sounds, no masses or organomegaly  Lymph Nodes: Cervical, supraclavicular normal  Extremities: no cyanosis, clubbing or edema  Musculoskeletal: normal range of motion, no joint swelling, deformity or tenderness  Neurologic: alert, no focal neurologic deficits    Data Review:  CBC:   Lab Results   Component Value Date    WBC 12.2 01/29/2020    RBC 4.33 01/29/2020     BMP:   Lab reactive.  No   obvious hilar lymphadenopathy.       LUNGS/PLEURA:  Dependent secretions suspected in the bronchus intermedius. Patchy bronchial secretions predominantly in the lingula and left lower lobe. Mild to moderate bronchial wall thickening.  Moderate centrilobular and   paraseptal emphysema with possible paraseptal involvement near the bases. New 2.1 cm x 1.6 cm noncalcified solid nodule with indistinct margins in the   posterior segment of the right upper lobe, abutting the right major fissure. Unchanged suspected rounded atelectasis in the bases of the lingula and left   lower lobe.  Suboptimal visualization of similar appearing subpleural   groundglass and less prominent reticulations near the bases.  Persistent   trace loculated left pleural effusion and associated left pleural thickening. No pneumothoraces nor right pleural effusion.  Unchanged calcified pleural   plaques in the base of the right hemithorax.       UPPER ABDOMEN:  Normal appearance of the included upper abdominal organs.       SOFT TISSUES/BONES: No supraclavicular nor axillary lymphadenopathy.  No   acute fractures nor suspicious osseous lesions.           Impression   1. Mild to moderate bronchial wall thickening with patchy airway secretions,   suggesting bronchitis or aspiration   2. New 2.1 cm x 1.6 cm solid nodule in the subpleural right upper lobe along   the right major fissure.  The primary considerations are focal pneumonia or   malignancy.  Recommend follow-up chest CT in 3 months as below consideration   for percutaneous biopsy.  PET would likely be hypermetabolic in the setting   of either etiology. 3. Moderate emphysema. 4. Suboptimal visualization of basilar predominant fibrotic changes due to   respiratory motion.  However, the appearance is not significantly changed. 5. Cardiovascular findings suggestive of pulmonary hypertension.          Problem List:     Community-acquired pneumonia  NSIP/pulmonary

## 2020-02-01 VITALS
DIASTOLIC BLOOD PRESSURE: 65 MMHG | HEART RATE: 105 BPM | WEIGHT: 185.4 LBS | OXYGEN SATURATION: 93 % | HEIGHT: 65 IN | TEMPERATURE: 97.9 F | RESPIRATION RATE: 24 BRPM | BODY MASS INDEX: 30.89 KG/M2 | SYSTOLIC BLOOD PRESSURE: 125 MMHG

## 2020-02-01 LAB
GLUCOSE BLD-MCNC: 213 MG/DL (ref 70–99)
PERFORMED ON: ABNORMAL

## 2020-02-01 PROCEDURE — 2580000003 HC RX 258: Performed by: HOSPITALIST

## 2020-02-01 PROCEDURE — 6360000002 HC RX W HCPCS: Performed by: HOSPITALIST

## 2020-02-01 PROCEDURE — 6370000000 HC RX 637 (ALT 250 FOR IP): Performed by: HOSPITALIST

## 2020-02-01 RX ORDER — PREDNISONE 20 MG/1
TABLET ORAL
Qty: 20 TABLET | Refills: 0 | Status: SHIPPED | OUTPATIENT
Start: 2020-02-01 | End: 2020-02-15

## 2020-02-01 RX ORDER — LEVOFLOXACIN 750 MG/1
750 TABLET ORAL DAILY
Qty: 7 TABLET | Refills: 0 | Status: SHIPPED | OUTPATIENT
Start: 2020-02-01 | End: 2020-02-08

## 2020-02-01 RX ADMIN — LISINOPRIL 5 MG: 5 TABLET ORAL at 09:59

## 2020-02-01 RX ADMIN — METOPROLOL TARTRATE 25 MG: 25 TABLET, FILM COATED ORAL at 09:59

## 2020-02-01 RX ADMIN — Medication 10 ML: at 10:00

## 2020-02-01 RX ADMIN — ALLOPURINOL 100 MG: 100 TABLET ORAL at 09:59

## 2020-02-01 RX ADMIN — DILTIAZEM HYDROCHLORIDE 120 MG: 120 CAPSULE, COATED, EXTENDED RELEASE ORAL at 09:59

## 2020-02-01 RX ADMIN — INSULIN LISPRO 6 UNITS: 100 INJECTION, SOLUTION INTRAVENOUS; SUBCUTANEOUS at 10:00

## 2020-02-01 RX ADMIN — NYSTATIN 500000 UNITS: 100000 SUSPENSION ORAL at 09:59

## 2020-02-01 RX ADMIN — CLOPIDOGREL 75 MG: 75 TABLET, FILM COATED ORAL at 09:59

## 2020-02-01 RX ADMIN — METHYLPREDNISOLONE SODIUM SUCCINATE 40 MG: 40 INJECTION, POWDER, FOR SOLUTION INTRAMUSCULAR; INTRAVENOUS at 09:59

## 2020-02-01 ASSESSMENT — PAIN SCALES - GENERAL: PAINLEVEL_OUTOF10: 0

## 2020-02-01 NOTE — PLAN OF CARE
Problem: Falls - Risk of:  Goal: Will remain free from falls  Description  Will remain free from falls  2/1/2020 0545 by Jacqui Sawant RN  Outcome: Ongoing  1/31/2020 1743 by Nai Rodriguez RN  Outcome: Ongoing  Goal: Absence of physical injury  Description  Absence of physical injury  Outcome: Ongoing     Problem: OXYGENATION/RESPIRATORY FUNCTION  Goal: Patient will maintain patent airway  2/1/2020 0545 by Jacqui Sawant RN  Outcome: Ongoing  1/31/2020 1743 by Nai Rodriguez RN  Outcome: Ongoing  Goal: Patient will achieve/maintain normal respiratory rate/effort  Description  Respiratory rate and effort will be within normal limits for the patient  Outcome: Ongoing

## 2020-02-01 NOTE — DISCHARGE INSTR - COC
Continuity of Care Form    Patient Name: Andrei Mccloud   :  10/3/1932  MRN:  [id number]    Admit date:  2020  Discharge date:  2020      Code Status Order: Full Code   Advance Directives:   [facility] Documentation     Date/Time Healthcare Directive Type of Healthcare Directive Copy in [facility] [address] Agent's Name Healthcare Agent's Phone Number    [phone]  Yes, patient has an advance directive for healthcare treatment  Living will  No, copy requested from family  --  --  --          Admitting Physician:  Ede Lam MD  PCP: Juan German MD    Discharging Nurse: Marla Taylor RN, [facility] Unit/Room#: 5XL-4944/6600-92  Discharging Unit Phone Number: [phone]    Emergency Contact:   Extended Emergency Contact Information  Primary Emergency Contact: Derick [facility]  Address: [address] [place] [address] Phone: [phone]  Mobile Phone: [phone]  Relation: Spouse  Secondary Emergency Contact: Malika Gutierrez  Address: [place] [id number]  Home Phone: [phone]  Relation: Child    Past Surgical History:  Past Surgical History:   Procedure Laterality Date    ABDOMINAL AORTIC ANEURYSM REPAIR  2009    [place]    CARPAL TUNNEL RELEASE Right [id number]    CATARACT REMOVAL Bilateral 2003    FINGER AMPUTATION Right     Traumatic right ring    SKIN CANCER EXCISION  2019    basil on right side of face, melanoma on the left upper arm.     VENTRAL HERNIA REPAIR  2009       Immunization History:   Immunization History   Administered Date(s) Administered    Hepatitis A 2008    Hepatitis B (Engerix-B) 2008    Influenza Vaccine, unspecified formulation 10/01/2016, 10/01/2018    Influenza, High Dose (Fluzone 65 yrs and older) 2017    Pneumococcal Conjugate 13-valent (Otizcpj13) 2015    Pneumococcal Polysaccharide ([id number]) 01/01/2007    Td, unspecified formulation 07/28/2008    Zoster Live (Zostavax) 01/01/2012    Zoster Recombinant (Shingrix) 06/01/2018, 01/14/2019       Active Problems:  Patient Active Problem List   Diagnosis Code    Pulmonary fibrosis ([facility] J84.10    Obstructive sleep apnea G47.33    HTN (hypertension) I10    Emphysema of lung ([facility]) J43.9    Carpal tunnel syndrome G56.00    Primary localized osteoarthrosis, forearm M19.039    Chronic respiratory failure ([facility]) J96.10    COPD, mild ([facility] J44.9    Bilateral carotid artery stenosis I65.23    Pure hypercholesterolemia E78.00    Coronary artery disease involving native coronary artery of native heart without angina pectoris I25.10    Diabetes mellitus ([facility]) E11.9    Oxygen dependent Z99.81    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    Pulmonary nodule R91.1    Palpitations R00.2    SVT (supraventricular tachycardia) ([facility]) I47.1    NSVT (nonsustained ventricular tachycardia) ([facility]) I47.2    Acute on chronic respiratory failure with hypoxia ([facility]) J96.21       Isolation/Infection:   Isolation          Contact and Droplet        Patient Infection Status     Infection Onset Added Last Indicated Last Indicated By Review Planned Expiration Resolved Resolved By    Respiratory Syncytial Virus (RSV) 01/29/20 01/31/20 01/29/20 Respiratory Panel, Molecular 02/07/20             Nurse Assessment:  Last Vital Signs: /69   Pulse 71   Temp 98 °F (36.7 °C) (Oral)   Resp 17   Ht 5' 5\" (1.651 m)   Wt 185 lb 6.4 oz (84.1 kg)   SpO2 95%   BMI 30.85 kg/m²     Last documented pain score (0-10 scale): Pain Level: 0  Last Weight:   Wt Readings from Last 1 Encounters:   01/31/20 185 lb 6.4 oz (84.1 kg)     Mental Status:  oriented, alert, coherent, logical, thought processes intact and able to concentrate and follow conversation    IV Access:  - None    Nursing

## 2020-02-01 NOTE — PROGRESS NOTES
Shift assessment complete, meds given whole with applesauce. Pt has been monitoring his O2 sat when ambulating to the bathroom, has not had issues, his amount of O2 is enough to sustain him while ambulating. Wife is at bedside to assist pt. Pt up In the chair, resting comfortably. Vitals have been stable. No other concerns at this time.

## 2020-02-02 LAB
BLOOD CULTURE, ROUTINE: NORMAL
CULTURE, BLOOD 2: NORMAL

## 2020-02-03 ENCOUNTER — TELEPHONE (OUTPATIENT)
Dept: PHARMACY | Facility: CLINIC | Age: 85
End: 2020-02-03

## 2020-02-03 PROCEDURE — 1111F DSCHRG MED/CURRENT MED MERGE: CPT

## 2020-02-03 NOTE — TELEPHONE ENCOUNTER
CLINICAL PHARMACY NOTE  Post-Discharge Transitions of Care ([facility])    Non-face-to-face services provided:  Assessment and support for treatment adherence and medication management-- med rec    Subjective/Objective:  Ayan Ndiaye is a 80 y.o. male. Patient was discharged from [facility] on 2/1/20 with a diagnosis of Acute on chronic respiratory failure with hypoxia . Patient outreach to review discharge medications and provide medication review and management. Spoke with spouse. Patient little weak, pain in calfs- did have CT of chest but she will follow up with doctor regarding this. Speech therapy eval this Friday and pulm apt tomorrow. Has high flow oxygen at home- has all supplies. Is not getting home care- states does not have home care needs at this time. Patient does shower himself, does put on socks and shoes himself, wife does tie shoes for him. Taking meds with applesauce, no mixed consistency diet. Denies fevers. SOB is around baseline now on 6 1/2 liters. Allergies   Allergen Reactions    Aspirin Hives and Swelling    Dilaudid [Hydromorphone Hcl] Other (See Comments)     Severe hallucination       Discharge Medications (as per current medication list/AVS):  There are NEW medications for you. START taking them after you leave the hospital:  levofloxacin (LEVAQUIN) 750 MG tablet Take 1 tablet by mouth daily for 7 days     You told us you were taking these medications at home, but the amount or how often you take this medication has CHANGED:  predniSONE (DELTASONE) 20 MG tablet Take 2 tablets by mouth daily for 7 days, THEN 1 tablet daily for 7 days.      These are medications you told us you were taking at home, CONTINUE taking them after you leave the hospital:  Medication Sig    allopurinol (ZYLOPRIM) 100 MG tablet TAKE ONE TABLET BY MOUTH DAILY    clopidogrel (PLAVIX) 75 MG tablet Take 1 tablet by mouth daily    lisinopril (PRINIVIL;ZESTRIL) 5 MG tablet Take 1 tablet by mouth daily    alendronate (FOSAMAX) 70 MG tablet Take 1 tablet by mouth every 7 days    albuterol (PROVENTIL) (2.5 MG/3ML) 0.083% nebulizer solution Take 3 mLs by nebulization every 6 hours as needed for Wheezing DX J44.9, J43.2, J84.10    budesonide-formoterol (SYMBICORT) 160-4.5 MCG/ACT AERO Inhale 2 puffs into the lungs 2 times daily    predniSONE (DELTASONE) 10 MG tablet Take 1 tablet by mouth daily    albuterol sulfate HFA (PROAIR HFA) 108 (90 Base) MCG/ACT inhaler Inhale 2 puffs into the lungs daily as needed for Shortness of Breath    tiotropium (SPIRIVA HANDIHALER) 18 MCG inhalation capsule Inhale 1 capsule into the lungs daily    metoprolol tartrate (LOPRESSOR) 25 MG tablet Take 1 tablet by mouth 2 times daily    diltiazem (CARDIZEM CD) 120 MG extended release capsule Take 1 capsule by mouth daily    simvastatin (ZOCOR) 20 MG tablet Take 20 mg by mouth nightly    ONETOUCH VERIO strip USE TO TEST DAILY    calcium carbonate 600 MG TABS tablet Take 1 tablet by mouth daily     ONETOUCH DELICA LANCETS 36E MISC Use to check blood sugar once daily. E11.9    Selenium 200 MCG CAPS Take  by mouth daily.  Zinc 50 MG TABS Take 50 mg by mouth daily.  Ascorbic Acid (VITAMIN C) 500 MG tablet Take 500 mg by mouth daily.  vitamin D (ERGOCALCIFEROL) 37495 UNIT CAPS capsule Take 2,000 Units by mouth Daily.    -takes 5000 units once a day    Cyanocobalamin (VITAMIN B 12 PO) Take 500 mcg by mouth daily at 1800. These are the medications you have told us you were taking at home, STOP taking them after you leave the hospital:  na    TCM Call Made?: Yes     Additional Medications:  na    Meds to Beds:No    Estimated Creatinine Clearance: 74 mL/min (A) (based on SCr of 0.7 mg/dL (L)). Assessment/Plan:    - Pt is taking medications as directed by discharging physician. Number of discrepancies: 1. Instructions per discharge list provided except per below documentation.  Identified medication discrepancies/issues:     · Category 4 (1):  1.  Updated med list    - Identified Potential Medication Interactions: No clinically significant interactions identified via [employer] Interaction Analysis as category D or higher.    - Renal Dosing: No renal adjustments necessary.    - Follow up appointment date (7 days for more severe illness, 14 days for others):    · Patient reminded of upcoming appointment  · Patient encouraged to schedule follow up with PCP    Thank you,    Jordana Dumont, PharmD, [place] Pharmacist  Direct: [phone], Ext [phone]  ========================================  CLINICAL PHARMACY NOTE   POST-DISCHARGE TELEPHONE FOLLOW-UP ADDENDUM    For Pharmacy Admin Tracking Only    TCM Call Made?: Yes  [facility] Select Patient?: Yes  Total # of Interventions Recommended: 1 - Updated Order #: 1  Total # Interventions Accepted: 1  Intervention Severity:   - Level 1 Intervention Present?: No   - Level 2 #: 0   - Level 3 #: 0  Outreach Status: Review Complete  Care Coordinator Outreach to Patient?: No  Provider Contacted?: No  Time Spent (min): 15    Additional Documentation:

## 2020-02-04 ENCOUNTER — OFFICE VISIT (OUTPATIENT)
Dept: PULMONOLOGY | Age: 85
End: 2020-02-04
Payer: MEDICARE

## 2020-02-04 VITALS — OXYGEN SATURATION: 94 % | SYSTOLIC BLOOD PRESSURE: 99 MMHG | HEART RATE: 76 BPM | DIASTOLIC BLOOD PRESSURE: 66 MMHG

## 2020-02-04 PROCEDURE — G8926 SPIRO NO PERF OR DOC: HCPCS | Performed by: INTERNAL MEDICINE

## 2020-02-04 PROCEDURE — 1111F DSCHRG MED/CURRENT MED MERGE: CPT | Performed by: INTERNAL MEDICINE

## 2020-02-04 PROCEDURE — 3023F SPIROM DOC REV: CPT | Performed by: INTERNAL MEDICINE

## 2020-02-04 PROCEDURE — 1036F TOBACCO NON-USER: CPT | Performed by: INTERNAL MEDICINE

## 2020-02-04 PROCEDURE — 4040F PNEUMOC VAC/ADMIN/RCVD: CPT | Performed by: INTERNAL MEDICINE

## 2020-02-04 PROCEDURE — 99214 OFFICE O/P EST MOD 30 MIN: CPT | Performed by: INTERNAL MEDICINE

## 2020-02-04 PROCEDURE — G8417 CALC BMI ABV UP PARAM F/U: HCPCS | Performed by: INTERNAL MEDICINE

## 2020-02-04 PROCEDURE — G8427 DOCREV CUR MEDS BY ELIG CLIN: HCPCS | Performed by: INTERNAL MEDICINE

## 2020-02-04 PROCEDURE — 1123F ACP DISCUSS/DSCN MKR DOCD: CPT | Performed by: INTERNAL MEDICINE

## 2020-02-04 PROCEDURE — G8484 FLU IMMUNIZE NO ADMIN: HCPCS | Performed by: INTERNAL MEDICINE

## 2020-02-04 NOTE — PROGRESS NOTES
[facility]  Progress Note  Alyx Georges MD       Tish Pastor   YOB: 1932    Date of Visit:  2/4/2020    Assessment/Plan:    1. COPD, severe  Stable  PFT 2/18:  INTERPRETATION:  Spirometry attempts were acceptable and reproducible. FVC  was normal at 3.48 liters, 119% predicted and normal FEV1 of 2.02 liters,  101% predicted. FEV1/FVC ratio was decreased at 58%. Lung volumes showed  normal total lung capacity of 112% predicted. Diffusion capacity showed  decreased DLCO of 48% predicted.     IMPRESSION:  Mild obstructive defect on spirometry with normal lung volumes  but moderate-to-severe decrease in diffusion capacity. In comparison to  the test that was done in 01/2016, no significant change in FVC or FEV1. Total lung capacity has improved by 9% and DLCO decreased by 10%. Mild by PFT but severe by Symptoms    Symbicort  Spiriva  HHN QAM and prn  Albuterol HFA in advance of activity  Mucinex  Prednisone 10 mg per day. Seems to be Prednisone dependent. Discussed long term complications of steroid therapy. Will continue level dose of Prednisone 10 per day. 2. Centrilobular emphysema (HCC)  Stable  CT Chest:8/18    Impression   No significant change in fibrosis which is inconsistent with UIP pattern due   to extensive ground-glass component.  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       Slight interval increase in size of the 8 mm nodule in the left lower lobe. This has been present for greater than 2 years, though consider continued   follow-up imaging given slight increase.       Unchanged severe emphysema. 3. Pulmonary fibrosis (HCC)  Stable  CT Chest: 8/18:  Impression   No significant change in fibrosis which is inconsistent with UIP pattern due   to extensive ground-glass component.  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       Slight interval increase in size of the 8 mm nodule in the left lower lobe.    This has been present for Provider   levofloxacin (LEVAQUIN) 750 MG tablet Take 1 tablet by mouth daily for 7 days  Alf Rossi MD   predniSONE (DELTASONE) 20 MG tablet Take 2 tablets by mouth daily for 7 days, THEN 1 tablet daily for 7 days. Anne Olivier MD   allopurinol (ZYLOPRIM) 100 MG tablet TAKE ONE TABLET BY MOUTH DAILY  Ander Schafer MD   clopidogrel (PLAVIX) 75 MG tablet Take 1 tablet by mouth daily  Ander Schafer MD   lisinopril (PRINIVIL;ZESTRIL) 5 MG tablet Take 1 tablet by mouth daily  Ander Schafer MD   alendronate (FOSAMAX) 70 MG tablet Take 1 tablet by mouth every 7 days  Ander Schafer MD   albuterol (PROVENTIL) (2.5 MG/3ML) 0.083% nebulizer solution Take 3 mLs by nebulization every 6 hours as needed for Wheezing DX J44.9, J43.2, J84.10  Ander Schafer MD   budesonide-formoterol (SYMBICORT) 160-4.5 MCG/ACT AERO Inhale 2 puffs into the lungs 2 times daily  Yanique Smith MD   predniSONE (DELTASONE) 10 MG tablet Take 1 tablet by mouth daily  Yanique Smith MD   albuterol sulfate HFA (PROAIR HFA) 108 (90 Base) MCG/ACT inhaler Inhale 2 puffs into the lungs daily as needed for Shortness of Breath  Ander Schafer MD   tiotropium (SPIRIVA HANDIHALER) 18 MCG inhalation capsule Inhale 1 capsule into the lungs daily  Ander Schafer MD   metoprolol tartrate (LOPRESSOR) 25 MG tablet Take 1 tablet by mouth 2 times daily  Claudia Ellis MD   diltiazem (CARDIZEM CD) 120 MG extended release capsule Take 1 capsule by mouth daily  Claudia Ellis MD   simvastatin (ZOCOR) 20 MG tablet Take 20 mg by mouth nightly  Historical Provider, MD Lyndsay Rosales strip USE TO TEST DAILY  Ander Schafer MD   calcium carbonate 600 MG TABS tablet Take 1 tablet by mouth daily   Historical Provider, MD Munira Whitaker LANCETS 47E MISC Use to check blood sugar once daily. E11.9  Ander Schafer MD   Selenium 200 MCG CAPS Take  by mouth daily. Historical Provider, MD   Zinc 50 MG TABS Take 50 mg by mouth daily.     Historical

## 2020-02-04 NOTE — DISCHARGE SUMMARY
extended release capsule  Commonly known as:  CARDIZEM CD  Take 1 capsule by mouth daily     lisinopril 5 MG tablet  Commonly known as:  PRINIVIL;ZESTRIL  Take 1 tablet by mouth daily     metoprolol tartrate 25 MG tablet  Commonly known as:  LOPRESSOR  Take 1 tablet by mouth 2 times daily     OneTouch Delica Lancets 36Q Misc  Use to check blood sugar once daily. E11.9     OneTouch Verio strip  Generic drug:  blood glucose test strips  USE TO TEST DAILY     Selenium 200 MCG Caps     simvastatin 20 MG tablet  Commonly known as:  ZOCOR     tiotropium 18 MCG inhalation capsule  Commonly known as:  Spiriva HandiHaler  Inhale 1 capsule into the lungs daily     VITAMIN B 12 PO     vitamin C 500 MG tablet  Commonly known as:  ASCORBIC ACID     VITAMIN D (ERGOCALCIFEROL) PO     Zinc 50 MG Tabs         * This list has 2 medication(s) that are the same as other medications prescribed for you. Read the directions carefully, and ask your doctor or other care provider to review them with you. Where to Get Your Medications      These medications were sent to [facility] [place] [address], [address] [place] [address], [address] [place] [address]    Phone:  [phone]   · levofloxacin 750 MG tablet  · predniSONE 20 MG tablet         Discharge recommendations given to patient. Follow Up. pcp in 1 week   Disposition. home  Activity. activity as tolerated  Diet: No diet orders on file      Spent 45  minutes in discharge process.     Signed:  Chandler Goldmann, MD     2/4/2020 1:34 PM

## 2020-02-07 ENCOUNTER — HOSPITAL ENCOUNTER (OUTPATIENT)
Dept: SPEECH THERAPY | Age: 85
Setting detail: THERAPIES SERIES
Discharge: HOME | End: 2020-02-07
Payer: MEDICARE

## 2020-02-07 PROCEDURE — 92610 EVALUATE SWALLOWING FUNCTION: CPT

## 2020-02-07 PROCEDURE — 92526 ORAL FUNCTION THERAPY: CPT

## 2020-02-07 NOTE — PROGRESS NOTES
Outpatient Speech Therapy  Phone: [phone] Fax: [phone]     To:        Patient: Mando Smith  : 10/3/1932  MRN: [id number]  Evaluation Date: 2020      Diagnosis Information: Oral phase dysphagia (R13.11)  Treatment Information: Mild-Moderate Oropharyngeal Dysphagia            Stephanie Toribio  Dear Dr. Millie Whittington,  The following patient has been evaluated for speech therapy services and for therapy to continue, Medicare requires monthly physician review of the treatment plan. Please review the attached evaluation and/or summary of the patient's plan of care, and verify that you agree therapy should continue by signing the attached document and sending it back to our office. Plan of Care/Treatment to date:  [] Speech-Language Evaluation/Treatment    [x] Dysphagia Evaluation/Treatment        [x] Dysphagia Treatment via Neuromuscular Electrical Stimulation (NMES)   [x] Modified Barium Swallowing Study   [] Cognitive-Linguistic Skills Development  [] Voice evaluation and Treatment      [] Evaluation, modification, and Training of Voice Prosthetic     [] Evaluation for Speech-Generating Augmentative and Alternative Communication Device   [] Therapeutic Services for the use of Speech-Generating Device. [] Other:          Frequency/Duration:  # Days per week: [] 1 day # Weeks: [] 1 week [] 5 weeks      [x] 2 days? [] 2 weeks [x] 6 weeks     [] 3 days   [] 3 weeks [] 7 weeks     [] 4 days   [x] 4 weeks [] 8 weeks    Rehab Potential: [] Excellent [x] Good [] Fair  [] Poor       Electronically signed by:     YASSINE Vega [facility]  Speech-Language Pathology Student    The speech-language pathologist was present, directed the patient's care, made skilled judgment and was responsible for assessment and treatment. Haley MOISE CCC-SLP S.P. [id number]  Speech-Language Pathologist       If you have any questions or concerns, please don't hesitate to call.   Thank you for your

## 2020-02-07 NOTE — PROGRESS NOTES
appearance is not significantly changed. 5. Cardiovascular findings suggestive of pulmonary hypertension. Date of Eval: 2/7/2020  Evaluating Therapist: Lynnann Cockayne    Current Diet level:  Current Diet : Regular  Current Liquid Diet : Thin    Primary Complaint:   Patient Complaint: Pt/Wife complain of coughing during meals. Pain: No.     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 Impression  Dysphagia Diagnosis  Dysphagia Diagnosis: Mild to moderate oral stage dysphagia;Mild to moderate pharyngeal stage dysphagia  Dysphagia Outcome Severity Scale: Level 4: Mild moderate dysphagia- Intermittent supervision/cueing. One - two diet consistencies restricted  Oral mech exam revealed decreased lingual ROM and decreased lingual strength on the right side. Pt presented with trials of regular texture, soft solids, and thin liquids to assess overall swallow function. Pt exhibits mild-moderate oropharyngeal dysphagia characterized by reduced bolus control, reduced A-P propulsion with lingual pumping, suspected premature loss of bolus to the pharynx, delayed swallow onset, decreased hyolaryngeal elevation via manual palpation, and apparent delayed pharyngeal clearing. Thin liquids revealed suspected premature loss of bolus to pharynx, delayed swallow initiation, and reduced hyolaryngeal elevation. Thin liquids in isolation tolerated with no overt clinical s/s of aspiration/penetration. Coughing assessed x1 with thin liquids in combination with solid textures. Regular textures revealed reduced/prolonged mastication and decreased A-P tongue propulsion. Soft solids revealed improved bolus control and mastication.   Pt utilized liquid \"wash\" to clear oral cavity of solid textures. Suspected reduced pharyngeal clearing with solid textures as evidenced by delayed coughing. Slight increased WOB assessed during mastication. Pt encouraged to slow down, take breaks, and not talk during meals to conserve energy. Recommend continue Regular texture diet with thin liquids; No straws; No mixed consistencies (i.e. Chicken noodle soup, cereal with milk, pill with water, etc.) and downgrade to HONEY thick /Moderately thick liquids if decline in respiratory status occurs. Pt would benefit from dysphagia tx to improve breathing/swallow coordination and airway protection. Reviewed MBS video loops with pt and normal vs impaired swallow. Pt trained in breathing/swallowing coordination exercise, effortful swallow, and airway protection exercises. Pt provided handouts on treatment for dysphagia and diet recommendations. Pt/wife verbalized understanding/agreement. Treatment Plan  Requires SLP Intervention: Yes  Duration/Frequency of Treatment: 2x week x4-6 weeks         Recommended Diet and Intervention  Solid: Diet Solids Recommendation: Regular  Liquid: Liquid Consistency Recommendation: Thin  Medication:Recommended Form of Meds: Meds in puree  Therapeutic Interventions: Vital Stim/NMES, Bolus control exercises, Laryngeal exercises, Patient/Family education, Pharyngeal exercises, Diet tolerance monitoring, Oral care, Thermal gustatory stimulation    Compensatory Swallowing Strategies Attempted  Compensatory Swallowing Strategies: Small bites/sips;Eat/Feed slowly; No straws;Remain upright for 30-45 minutes after meals;Upright as possible for all oral intake(No talking during meals.)      Treatment/Goals  Dysphagia Goals:   1.) The patient will tolerate recommended diet without observed clinical signs of aspiration. 2.) The patient will recall and perform compensatory strategies, with no cues.   3.)The patient will improve oral preparation phase via bolus control/manipulation exercises to 5/5 each trial.    General  Chart Reviewed: Yes  Visit Information  Onset Date: 01/29/20  SLP Insurance Information: Medicare- as medically necessary  Behavior/Cognition  Behavior/Cognition: Alert; Cooperative;Pleasant mood  Temperature Spikes Noted: No  Respiratory Status: O2 via nasual cannula  O2 Device: Nasal cannula  Liters of Oxygen: (Pt on 7L at home however transport O2 only goes up to 5L. )  Communication Observation: Functional  Follows Directions: Simple  Dentition: Dentures top;Dentures bottom  Patient Positioning: Upright in chair  Baseline Vocal Quality: Dysphonic  Volitional Cough: Wet;Congested  Volitional Swallow: Delayed  Prior Dysphagia History: Pt evaluated and treated for dysphagia in inpatient during hospitalization. A MBS was completed with laryngeal penetration with all liquids. Outpatient speech therapy recommended. Consistencies Administered: Dysphagia Soft and Bite-Sized (Dysphagia III); Thin - cup;Reg solid         Vision/Hearing  Vision  Vision: Impaired  Vision Exceptions: Wears glasses for reading  Hearing  Hearing: Exceptions to [facility]  Hearing Exceptions: Left hearing aid;Right hearing aid    Oral Motor Deficits  Oral/Motor  Oral Motor: Exceptions to [facility]  Lingual ROM: Reduced right  Lingual Strength: Reduced    Oral Phase Dysfunction  Oral Phase  Oral Phase: Exceptions  Oral Phase Dysfunction  Impaired Mastication: Reg Solid  Decreased Anterior to Posterior Transit: All  Suspected Premature Bolus Loss: All     Indicators of Pharyngeal Phase Dysfunction   Pharyngeal Phase  Pharyngeal Phase: Exceptions  Indicators of Pharyngeal Phase Dysfunction  Delayed Swallow: All  Decreased Laryngeal Elevation: All  Cough - Delayed:  All    Prognosis  Prognosis  Prognosis for safe diet advancement: good  Barriers to reach goals: fatigue  Individuals consulted  Consulted and agree with results and recommendations: Family member  Family member consulted: Spouse    Education  Patient

## 2020-02-12 ENCOUNTER — HOSPITAL ENCOUNTER (OUTPATIENT)
Dept: SPEECH THERAPY | Age: 85
Setting detail: THERAPIES SERIES
Discharge: HOME | End: 2020-02-12
Payer: MEDICARE

## 2020-02-12 PROCEDURE — 92526 ORAL FUNCTION THERAPY: CPT

## 2020-02-12 NOTE — FLOWSHEET NOTE
Speech-Language Pathology  Daily Treatment Note    Date:  2020    Patient Name:  Redd Peñaloza    :  10/3/1932  MRN: [id number]  Restrictions/Precautions:  Aspiration; fall risk  Diagnosis:  Oral phase dysphagia (R13.11)  Treatment Diagnosis:  Mild-Moderate Oropharyngeal Dysphagia  Insurance/Certification information: Medicare- as medically needed   Referring Physician:  Dr. Tim Moran of care signed (Y/N): Y; 20  Visit# / total visits:    Pain level: 0/10  Medicare $ including today's session: $268.14        Progress Note: []  Yes  [x]  No  Next due by: Visit #2/10     Subjective:  Pt's wife accompanied pt to tx. Pt reports soreness of throat muscles from completing exercises at home since evaluation. SLP reviewed how often to complete exercises each day to reduce muscle soreness. Additional exercises provided today. Encouraged pt to take breaks during exercises and to stop if lightheadedness/dizziness and/or SOB occurs. Pt verbalized understanding and appreciation. Objective:   1.) The patient will tolerate recommended diet without observed clinical signs of aspiration.   - Pt tolerated 8/9 trials of thin liquids via a cup with no overt clinical s/s of aspiration/penetration. Pt with delayed throat clear x1.    - Pt tolerated 8/9 trials of regular texture solids (peanut butter crackers) with no overt clinical s/s of aspiration/penetration. Pt produced throat clear x1.   - Pt utilized a liquid \"wash\" x4 during regular texture trials. 2.) The patient will recall and perform compensatory strategies, with no cues. - Pt required verbal cues x1 to not talk while eating.  - Pt independently utilized slow rate, small bites/sips, and took breaks when SOB. 3.)The patient will improve oral preparation phase via bolus control/manipulation exercises to 5/5 each trial.  - Goal not addressed this tx session.      Other Treatment:  - Neuromuscular electrical stimulation (VitalStim) was

## 2020-02-14 ENCOUNTER — HOSPITAL ENCOUNTER (OUTPATIENT)
Dept: SPEECH THERAPY | Age: 85
Setting detail: THERAPIES SERIES
Discharge: HOME | End: 2020-02-14
Payer: MEDICARE

## 2020-02-14 PROCEDURE — 92526 ORAL FUNCTION THERAPY: CPT

## 2020-02-14 NOTE — FLOWSHEET NOTE
Speech-Language Pathology  Daily Treatment Note    Date:  2020    Patient Name:  Patito Redd    :  10/3/1932  MRN: [id number]  Restrictions/Precautions:  Aspiration; fall risk  Diagnosis:  Oral phase dysphagia (R13.11)  Treatment Diagnosis:  Mild-Moderate Oropharyngeal Dysphagia  Insurance/Certification information: Medicare- as medically needed   Referring Physician:  Dr. Eliana Albarado of care signed (Y/N): Y; 20  Visit# / total visits:  3/12  Pain level: 0/10  Medicare $ including today's session: $357.64        Progress Note: []  Yes  [x]  No  Next due by: Visit #3/10     Subjective:  Pt's wife accompanied pt to tx. Pt/wife reports completion of airway closure exercises at home. Objective:   1.) The patient will tolerate recommended diet without observed clinical signs of aspiration.   - Pt tolerated 13/15 trials of thin liquids via a cup with no overt clinical s/s of aspiration/penetration. Pt with delayed throat clears x2. - Pt tolerated 8/8 trials of regular texture solids (peanut butter crackers) with no overt clinical s/s of aspiration/penetration.   - Pt utilized a liquid \"wash\" x1 during regular texture trials and a lingual sweep x2 after regular texture trials. 2.) The patient will recall and perform compensatory strategies, with no cues. - Pt recalled breathing and swallowing pattern while eating.  - Pt independently utilized slow rate and small bites/sips. - Pt required cues x2 to not talk while eating.    3.)The patient will improve oral preparation phase via bolus control/manipulation exercises to 5/5 each trial.  - Pt instructed and trained in completion of bolus control exercises via pink swab as follows:   - Lateralization x10 repetitions   - A-P propulsions x10 repetitions   - Diagonal A-P propulsions left x10 and right x10 repetitions  - Visual/verbal cues required.     Other Treatment:  - Neuromuscular electrical stimulation (VitalStim) was initiated in

## 2020-02-17 ENCOUNTER — HOSPITAL ENCOUNTER (OUTPATIENT)
Dept: SPEECH THERAPY | Age: 85
Setting detail: THERAPIES SERIES
Discharge: HOME | End: 2020-02-17
Payer: MEDICARE

## 2020-02-17 PROCEDURE — 92526 ORAL FUNCTION THERAPY: CPT

## 2020-02-18 ENCOUNTER — TELEPHONE (OUTPATIENT)
Dept: PULMONOLOGY | Age: 85
End: 2020-02-18

## 2020-02-18 RX ORDER — BUDESONIDE AND FORMOTEROL FUMARATE DIHYDRATE 160; 4.5 UG/1; UG/1
2 AEROSOL RESPIRATORY (INHALATION) 2 TIMES DAILY
Qty: 3 INHALER | Refills: 3 | Status: SHIPPED | OUTPATIENT
Start: 2020-02-18 | End: 2020-02-21 | Stop reason: ALTCHOICE

## 2020-02-18 RX ORDER — PREDNISONE 20 MG/1
20 TABLET ORAL DAILY
Qty: 30 TABLET | Refills: 6 | Status: SHIPPED | OUTPATIENT
Start: 2020-02-18 | End: 2020-03-19

## 2020-02-21 ENCOUNTER — HOSPITAL ENCOUNTER (OUTPATIENT)
Dept: SPEECH THERAPY | Age: 85
Setting detail: THERAPIES SERIES
Discharge: HOME | End: 2020-02-21
Payer: MEDICARE

## 2020-02-21 PROCEDURE — 92526 ORAL FUNCTION THERAPY: CPT

## 2020-02-21 RX ORDER — BUDESONIDE AND FORMOTEROL FUMARATE DIHYDRATE 160; 4.5 UG/1; UG/1
2 AEROSOL RESPIRATORY (INHALATION) 2 TIMES DAILY
Qty: 3 INHALER | Refills: 3 | Status: SHIPPED | OUTPATIENT
Start: 2020-02-21 | End: 2021-01-01 | Stop reason: ALTCHOICE

## 2020-02-21 NOTE — FLOWSHEET NOTE
Speech-Language Pathology  Daily Treatment Note    Date:  2020    Patient Name:  Tish Pastor    :  10/3/1932  MRN: [id number]  Restrictions/Precautions:  Aspiration; fall risk  Diagnosis:  Oral phase dysphagia (R13.11)  Treatment Diagnosis:  Mild-Moderate Oropharyngeal Dysphagia  Insurance/Certification information: Medicare- as medically needed   Referring Physician:  Dr. Katya Brothers of care signed (Y/N): Y; 20  Visit# / total visits:    Pain level: 0/10  Medicare $ including today's session: $536.67        Progress Note: []  Yes  [x]  No  Next due by: Visit #5/10     Subjective:  Pt's wife accompanied pt to tx session per usual. Upon arrival, pt reported that WOB had decreased since previous tx session. Pt/wife reports increase in steroid dosage. Pt O2 level at 86% prior to intiating PO trials and throughout. Pt utilized inhaler 1x during tx session. Objective:   1.) The patient will tolerate recommended diet without observed clinical signs of aspiration.   - Pt tolerated 10/10 trials of thin liquids via a cup with no overt clinical s/s of aspiration/penetration. Pt utilized liquid \"wash\" x3.  - Pt tolerated 8/9 trials of regular texture solids (peanut butter crackers) with no overt clinical s/s of aspiration/penetration. Liquid \"wash\" utilized after regular texture trials. During exercises, pt expectorated possible peanut-butter cracker/phlegm.  -No increased WOB or SOB assessed during trials. 2.) The patient will recall and perform compensatory strategies, with no cues. - Pt independently utilized slow rate and small bites/sips. - Pt independently took breaks to breath during PO trials.   - Pt required cues x2 to not talk while eating.    3.)The patient will improve oral preparation phase via bolus control/manipulation exercises to 5/5 each trial.  - Pt instructed and trained in completion of bolus control exercises via pink swab as follows:   - Lateralization x10 repetitions   - A-P propulsions x10 repetitions   - Diagonal A-P propulsions left x10 and right x10 repetitions    Other Treatment:  - Neuromuscular electrical stimulation (VitalStim) was initiated in conjunction with dysphagia treatment via placement 3a, 11.0 mA for 41 minutes, PO trials consumed including thin liquids x6oz and regular solids x10-15.    - Pt instructed and trained in laryngeal/pharyngeal strengthening exercises including:   - Hillary maneuver x10 repetitions    - Effortful swallow x10 repetitions     - Throat clear assessed 3x and cough 2x. - Pt instructed and trained in only x3 airway closure exercises x10 repetitions. Assessment: Pt with increased coughing and throat clears during exercises today. Pt suspects increased coughing and throat clearing is due to missed use of nebulizer prior to therapy today. Recommend continue current diet. Encouraged pt/wife to continue to follow-up with Pulmonologist if respiratory status declines. Pt would benefit from continued speech therapy to improve coordination of breathing and swallowing and to reduce risk of aspiration/penetration. Plan: Continue dysphagia tx 2x a week x4-6 weeks per plan of care. Timed Code Treatment: 0 minutes    Total Treatment Time: 54 minutes    Signature:      YASSINE Cabello [facility]  Speech-Language Pathology Student    The speech-language pathologist was present, directed the patient's care, made skilled judgment and was responsible for assessment and treatment. Frankey Coulter M. A. CCC-SLP S.P. [id number]  Speech-Language Pathologist

## 2020-02-24 ENCOUNTER — HOSPITAL ENCOUNTER (OUTPATIENT)
Dept: SPEECH THERAPY | Age: 85
Setting detail: THERAPIES SERIES
Discharge: HOME | End: 2020-02-24
Payer: MEDICARE

## 2020-02-24 PROCEDURE — 92526 ORAL FUNCTION THERAPY: CPT

## 2020-02-24 NOTE — FLOWSHEET NOTE
will improve oral preparation phase via bolus control/manipulation exercises to 5/5 each trial.  - Pt instructed and trained in completion of bolus control exercises via pink swab as follows:   - Lateralization x10 repetitions   - A-P propulsions x10 repetitions   - Diagonal A-P propulsions left x10 and right x10 repetitions    Other Treatment:  - Neuromuscular electrical stimulation (VitalStim) was initiated in conjunction with dysphagia treatment via placement 3a, 10.0 mA for 47 minutes, PO trials consumed including thin liquids x6oz and regular solids x10-15.    - Pt instructed and trained in laryngeal/pharyngeal strengthening exercises including:   - Hillary maneuver x10 repetitions    - Effortful swallow x10 repetitions       - Pt instructed and trained in only x1 airway closure exercises x10 repetitions due to respiratory status this date. Assessment: Pt with decreased coughing during tx today. Pt required multiple breaks to rest and breate during session today. Tx session ended early due to fatigue/respiratory status. Recommend continue current diet. Again encouraged pt/wife to continue to follow-up with Pulmonologist if respiratory status declines. Pt would benefit from continued speech therapy to improve coordination of breathing and swallowing and to reduce risk of aspiration/penetration. Plan: Continue dysphagia tx 2x a week x4-6 weeks per plan of care. Timed Code Treatment: 0 minutes    Total Treatment Time: 55 minutes    Signature:      Meredith MOISE CCC-SLP S.P. [id number]  Speech-Language Pathologist

## 2020-02-26 ENCOUNTER — OFFICE VISIT (OUTPATIENT)
Dept: CARDIOLOGY CLINIC | Age: 85
End: 2020-02-26
Payer: MEDICARE

## 2020-02-26 VITALS
DIASTOLIC BLOOD PRESSURE: 60 MMHG | SYSTOLIC BLOOD PRESSURE: 111 MMHG | HEIGHT: 65 IN | WEIGHT: 188.8 LBS | OXYGEN SATURATION: 85 % | HEART RATE: 82 BPM | BODY MASS INDEX: 31.46 KG/M2

## 2020-02-26 PROBLEM — I27.20 PULMONARY HTN (HCC): Status: ACTIVE | Noted: 2020-02-26

## 2020-02-26 PROCEDURE — 1123F ACP DISCUSS/DSCN MKR DOCD: CPT | Performed by: INTERNAL MEDICINE

## 2020-02-26 PROCEDURE — G8417 CALC BMI ABV UP PARAM F/U: HCPCS | Performed by: INTERNAL MEDICINE

## 2020-02-26 PROCEDURE — G8484 FLU IMMUNIZE NO ADMIN: HCPCS | Performed by: INTERNAL MEDICINE

## 2020-02-26 PROCEDURE — 1111F DSCHRG MED/CURRENT MED MERGE: CPT | Performed by: INTERNAL MEDICINE

## 2020-02-26 PROCEDURE — 4040F PNEUMOC VAC/ADMIN/RCVD: CPT | Performed by: INTERNAL MEDICINE

## 2020-02-26 PROCEDURE — 99214 OFFICE O/P EST MOD 30 MIN: CPT | Performed by: INTERNAL MEDICINE

## 2020-02-26 PROCEDURE — G8427 DOCREV CUR MEDS BY ELIG CLIN: HCPCS | Performed by: INTERNAL MEDICINE

## 2020-02-26 PROCEDURE — 1036F TOBACCO NON-USER: CPT | Performed by: INTERNAL MEDICINE

## 2020-02-26 RX ORDER — DILTIAZEM HYDROCHLORIDE 120 MG/1
120 CAPSULE, COATED, EXTENDED RELEASE ORAL DAILY
Qty: 90 CAPSULE | Refills: 4 | Status: SHIPPED | OUTPATIENT
Start: 2020-02-26 | End: 2020-04-06 | Stop reason: SDUPTHER

## 2020-02-26 NOTE — PATIENT INSTRUCTIONS
If having an episode of heart racing (10 min) can take another Metoprolol (if lasting longer than 1 hr needs ER)  Continue current medications  Follow up in 1 year or sooner if needed

## 2020-02-26 NOTE — PROGRESS NOTES
[facility]  Cardiac Consult     Referring Provider:  Marco A Hendricks MD     Chief Complaint   Patient presents with    Coronary Artery Disease     Patient return to office for 4 month follow up     Hypertension        History of Present Illness:  80 y.o. male with end stage lung disease seen in f/u for SVT. He has had episodes of tachycardia for 20 years. Sudden onset heart racing. Lasts for a few minutes. No exacerbating factors. Relieved by valsalva. No associated chest pain. HR from 150-190. He has been on Toprol for this for years. This was decreased to 25 mg in May due to low BP apparently. He was seen and ECHO and MCOT ordered. ECHO with normal LV. Enlarged RV with severe pulmonary HTN, RVSP=70 mmHg. MCOT shows episodes of SVT and possible NSVT. One episode SVT 45 minutes. Very symptomatic. Rates 170. Usually can valsalva with resolution. Cardizem 120 added with improvement. He was admitted 1 month ago with pneumonia. Since then his O2 sats have been lower. He is on 8 liters at home. He has portable O2 that provides 5 liters. His SaO2 runs in low to mid 80's on the 5 liters. His BP has been OK but low normal SBP running low 100s. No dizziness or lightheadedness. He does have SVT every one- weeks. Usually lasts 2-3 minutes. He had one episode lasting 60 minutes. He feels heart racing but no other real symptoms. Past Medical History:   has a past medical history of Arthritis, Emphysema of lung ([address] Full dentures, Hearing aid worn, Hypertension, On home oxygen therapy, Pulmonary fibrosis ([address] and Sleep apnea. 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      Social History:  Social History     Tobacco Use    Smoking status: Former Smoker extended release capsule Take 1 capsule by mouth daily Yes Michelle Franklin MD   simvastatin (ZOCOR) 20 MG tablet Take 20 mg by mouth nightly Yes Historical Provider, MD Daily [place] strip USE TO TEST DAILY Yes Faustino Garcia MD   calcium carbonate 600 MG TABS tablet Take 1 tablet by mouth daily  Yes Historical Provider, MD Bernabe Rojas LANCETS 56S MISC Use to check blood sugar once daily. E11.9 Yes Faustino Garcia MD   Selenium 200 MCG CAPS Take  by mouth daily. Yes Historical Provider, MD   Zinc 50 MG TABS Take 50 mg by mouth daily. Yes Historical Provider, MD   Ascorbic Acid (VITAMIN C) 500 MG tablet Take 500 mg by mouth daily. Yes Historical Provider, MD   VITAMIN D, ERGOCALCIFEROL, PO Take 5,000 Units by mouth Daily  Yes Historical Provider, MD   Cyanocobalamin (VITAMIN B 12 PO) Take 500 mcg by mouth daily  Yes Historical Provider, MD       [x] Medications and dosages reviewed. ROS:  [x]Full ROS obtained and negative except as mentioned in HPI      Physical Examination:    Vitals:    02/26/20 0955   BP: 111/60   Site: Left Upper Arm   Position: Sitting   Cuff Size: Large Adult   Pulse: 82   SpO2: (!) 85%   Weight: 188 lb 12.8 oz (85.6 kg)   Height: 5' 5\" (1.651 m)        · GENERAL: Elderly, chronically ill appearing male on O2 appearing mildly dyspneic   · NEUROLOGICAL: Alert and oriented  · PSYCH: Calm, pleasant affect  · SKIN: Warm and dry, No visible rash,   · EYES: Pupils equal and round, Sclera non-icteric,   · HENT:  External ears and nose unremarkable, mucus membranes moist  · MUSCULOSKELETAL: Normal cephalic, neck supple  · CAROTID: Normal upstroke, no bruits  · CARDIAC: JVP normal, Normal PMI, Regular rate and rhythm, normal S1S2, No murmur, rub, or gallop  · RESPIRATORY: Normal respiratory effort, Poor air movement.  Minimal wheezing  · EXTREMITIES: No LE edema  · GASTROINTESTINAL: normal bowel sounds, soft, non-tender, No hepatomegaly         Assessment: Tachycardia/palpitation:  Stable. Same treatmnet. Not much BP room to increase meds and relatively asymptomatic. Mostly SVT  Cardizem added with symptomatic improvement. Poor candidate to consider ablation. Also some NSVT-Discussed with pt and daughter. Not candidate for invasive evaluation. Try to increase metoprolol to 25 BID. Monitor BP. Need SBP>100    HTN:  /60 (Site: Left Upper Arm, Position: Sitting, Cuff Size: Large Adult)   Pulse 82   Ht 5' 5\" (1.651 m)   Wt 188 lb 12.8 oz (85.6 kg)   SpO2 (!) 85%   BMI 31.42 kg/m²   Well controlled. On low side. No room to increase meds    Pulmonary HTN:  Severe. Secondary to pulmonary fibrosis  Unchanged. F/u pulmonary    Plan:  Same Rx  F/u 1 year  Not a lot of options and stressful for him to come to office.   Call for issues    Thank you for allowing me to participate in the care of this individual.      Jaja Alexander M.D., [facility] [place]

## 2020-02-28 ENCOUNTER — HOSPITAL ENCOUNTER (OUTPATIENT)
Dept: SPEECH THERAPY | Age: 85
Setting detail: THERAPIES SERIES
Discharge: HOME | End: 2020-02-28
Payer: MEDICARE

## 2020-02-28 PROCEDURE — 92526 ORAL FUNCTION THERAPY: CPT

## 2020-02-28 NOTE — FLOWSHEET NOTE
Speech-Language Pathology  Daily Treatment Note    Date:  2020    Patient Name:  Reid Oconnor    :  10/3/1932  MRN: [id number]  Restrictions/Precautions:  Aspiration; fall risk  Diagnosis:  Oral phase dysphagia (R13.11)  Treatment Diagnosis:  Mild-Moderate Oropharyngeal Dysphagia  Insurance/Certification information: Medicare- as medically needed   Referring Physician:  Dr. [facility] of care signed (Y/N): Y; 20  Visit# / total visits:    Pain level: 0/10  Medicare $ including today's session: $715.67        Progress Note: []  Yes  [x]  No  Next due by: Visit #7/10     Subjective:  Pt's wife accompanied pt to tx session per usual. Upon arrival, pt again reported feeling SOB with exertion, recovering to 86% once seated. Pt on highest level of O2 via transport O2 (5L). Pt on 8L of O2 at home. Pt/wife looking into getting transport O2 tanks in order to allow pt to utilize needed O2 vs limited 5L he currently has. Encouraged pt/wife to continue to monitor O2 sats and notify Pulmonologist.  Both verbalized agreement. Objective:   1.) The patient will tolerate recommended diet without observed clinical signs of aspiration.   - Pt tolerated 10/10 trials of thin liquids via a cup with no overt clinical s/s of aspiration/penetration.   - Pt tolerated 9/9 trials of soft solids with no overt clinical s/s of aspiration/penetration.    -Pt/wife deny coughing during meals at home since implementing strategies. 2.) The patient will recall and perform compensatory strategies, with no cues. - Pt independently utilized slow rate, small bites/sips, took breaks to breathe, and did not talk.      3.)The patient will improve oral preparation phase via bolus control/manipulation exercises to 5/5 each trial.  - Pt instructed and trained in completion of bolus control exercises via pink swab as follows:   - Lateralization x10 repetitions   - A-P propulsions x10 repetitions   - Diagonal A-P propulsions left x10 and right x10 repetitions    Other Treatment:  - Neuromuscular electrical stimulation (VitalStim) was initiated in conjunction with dysphagia treatment via placement 3b, 10.0 mA for 41 minutes, PO trials consumed including thin liquids x4oz and soft solids x10-15.    - Pt instructed and trained in laryngeal/pharyngeal strengthening exercises including:   - Hillary maneuver x20 repetitions    - Effortful swallow x20 repetitions       - Pt instructed and trained in airway closure exercises x3 x10 repetitions. Assessment: No delayed coughing assessed with PO this date. Pt's progress limited by respiratory status. Pt again required multiple breaks to rest and breate during session today. Recommend continue current diet. Again encouraged pt/wife to continue to follow-up with Pulmonologist if respiratory status declines. Pt would benefit from continued speech therapy to improve coordination of breathing and swallowing and to reduce risk of aspiration/penetration. Discussed upcoming re-peat Modified Barium Swallow study. Pt/wife verbalized agreement with recommendation. Plan: Continue dysphagia tx 2x a week x4-6 weeks per plan of care. Timed Code Treatment: 0 minutes    Total Treatment Time: 60 minutes    Signature:      Rinku MOISE CCC-SLP S.P. [id number]  Speech-Language Pathologist

## 2020-03-02 ENCOUNTER — HOSPITAL ENCOUNTER (OUTPATIENT)
Dept: SPEECH THERAPY | Age: 85
Setting detail: THERAPIES SERIES
Discharge: HOME | End: 2020-03-02
Payer: MEDICARE

## 2020-03-02 PROCEDURE — 92526 ORAL FUNCTION THERAPY: CPT

## 2020-03-05 ENCOUNTER — HOSPITAL ENCOUNTER (OUTPATIENT)
Dept: SPEECH THERAPY | Age: 85
Setting detail: THERAPIES SERIES
Discharge: HOME | End: 2020-03-05
Payer: MEDICARE

## 2020-03-05 PROCEDURE — 92526 ORAL FUNCTION THERAPY: CPT

## 2020-03-05 NOTE — FLOWSHEET NOTE
placement 3b, 9.0 mA for 38 minutes, PO trials consumed including thin liquids x8oz and regular textures x10-15.    - Pt instructed and trained in laryngeal/pharyngeal strengthening exercises including:   - Hillary maneuver x30 repetitions    - Effortful swallow x30 repetitions       - Pt instructed and trained in airway closure exercises x3 x10 repetitions. Pt able to complete independently with breaks to rest due to fatigue/breathing      Assessment: Pt again with no delayed coughing with PO this date. Re-peat MBS scheduled for 3/10/20. Pt would benefit from continued speech therapy to improve coordination of breathing and swallowing and to reduce risk of aspiration/penetration. Plan: Continue dysphagia tx 2x a week x4-6 weeks per plan of care. Timed Code Treatment: 0 minutes    Total Treatment Time: 50 minutes    Signature:      Juany MOISE CCC-SLP S.P. [id number]  Speech-Language Pathologist

## 2020-03-09 ENCOUNTER — HOSPITAL ENCOUNTER (OUTPATIENT)
Dept: SPEECH THERAPY | Age: 85
Setting detail: THERAPIES SERIES
Discharge: HOME | End: 2020-03-09
Payer: MEDICARE

## 2020-03-09 PROCEDURE — 92526 ORAL FUNCTION THERAPY: CPT

## 2020-03-09 NOTE — PROGRESS NOTES
aspiration viewed during study.  Thin liquids via cup revealed premature spillage over tip of epiglottis to pyriforms, delayed swallow initiation was noted with laryngeal penetration viewed during the swallow.  Laryngeal penetration was largely self-clearing with no aspiration viewed.  After soft solids were introduced, deeper laryngeal penetration was noted with thin liquid trials.  A chin tuck was trialed, however this was ineffective in clearing penetrated material.  Nectar thick liquids revealed mild improvement in swallow timing, however laryngeal penetration was viewed during the swallow.  Mild vallecular residue was present post swallow with nectar thick liquids.  Honey thick liquids also revealed intermittent laryngeal penetration during the swallow. Soft and regular solid textures revealed prolonged mastication, effective oral and pharyngeal clearing was noted.  Although no aspiration was viewed with any texture during study, Pt is at increased risk due to delayed swallow initiation and reduced airway protection, recommend strict use of aspiration precautions. *Pt scheduled for re-peat MBS 3/10/20.     Assessment:   Summary: Pt has met 2/3 established tx goals this phase of care. At time of progress note, patient exhibiting oropharyngeal dysphagia characterized by suspected premature loss of bolus to pharynx, delayed swallow initiation, and suspected intermittent delayed pharyngeal clearing. Modified Barium Swallow study completed prior to initiation of outpatient tx 1/31/20 with recommendation for Regular texture diet with thin liquids; No straws; No mixed consistencies (i.e. Chicken noodle soup, cereal with milk, pill with water, etc.). Pt scheduled for re-peat MBS 3/10/20 to further assess progress made and determine need for continued dysphagia tx.   Recommend continue strict use of aspiration precautions including 1) upright for meals, 2) bolus hold, 3) small bites/sips, 4) No straws, 5) No mixed consistencies (i.e. Chicken noodle soup, cereal with milk, pill with water, etc.). Pt able to complete all laryngeal/pharyngeal strengthening and bolus control exercises with occasional cues. Pt also with apparent improved breathing/swallowing coordination. Will determine further need for tx pending results of re-peat MBS. Pt and wife in agreement with recommendations.  Patient's response to treatment: Good    Progress towards goals:    1.) The patient will tolerate recommended diet without observed clinical signs of aspiration.   - Pt tolerated 14/16 trials of thin liquids via cup with no overt clinical s/s of aspiration/penetration. Pt with immediate throat clear x2. Of importance, this only occurs when pt \"swishes\" liquid in mouth to clear solid residue prior to swallowing.    - Pt tolerated 6/6 trials of regular textures with no overt clinical s/s of aspiration/penetration. GOAL PARTIALLY MET     2. ) The patient will recall and perform compensatory strategies, with no cues. - Pt independently utilized slow rate, small bites/sips, took breaks to breathe, and did not talk. GOAL MET     3. )The patient will improve oral preparation phase via bolus control/manipulation exercises to 5/5 each trial.  - Pt instructed and trained in completion of bolus control exercises via pink swab as follows:              - Lateralization x10 repetitions              - A-P propulsions x10 repetitions              - Diagonal A-P propulsions left x10 and right x10 repetitions  -Pt completed independently  GOAL MET     Other Treatment:  - Neuromuscular electrical stimulation (VitalStim) was initiated in conjunction with dysphagia treatment via placement 3b, 10.0 mA for 42 minutes, PO trials consumed including thin liquids x8oz and regular textures x10-15.     - Pt instructed and trained in laryngeal/pharyngeal strengthening exercises including:              - Hillary maneuver x30 repetitions               - Effortful

## 2020-03-10 ENCOUNTER — HOSPITAL ENCOUNTER (OUTPATIENT)
Dept: SPEECH THERAPY | Age: 85
Setting detail: THERAPIES SERIES
Discharge: HOME | End: 2020-03-10
Payer: MEDICARE

## 2020-03-10 ENCOUNTER — HOSPITAL ENCOUNTER (OUTPATIENT)
Dept: GENERAL RADIOLOGY | Age: 85
Discharge: HOME | End: 2020-03-10
Payer: MEDICARE

## 2020-03-10 PROCEDURE — 74230 X-RAY XM SWLNG FUNCJ C+: CPT

## 2020-03-10 PROCEDURE — 92526 ORAL FUNCTION THERAPY: CPT

## 2020-03-10 PROCEDURE — 92611 MOTION FLUOROSCOPY/SWALLOW: CPT

## 2020-03-10 NOTE — PROCEDURES
regarding impact of respiratory status on airway protection and potential decline in function was explained to the Pt, he and his wife verbalized understanding and potential agreement with trial of honey thick liquids to assess for potential improvement in respiratory status. Pt is scheduled for a follow up Outpatient SLP treatment on Friday, 3/13 with further diet and treatment recommendations to resume at that time. Aspiration/Penetration Risk:  High risk with thin and nectar thick liquids over successive po trials and in combination with other textures    Recommendations:    Diet Level:   1) Pt agreeable to ONLY thin water with good oral care (ie [employer] Protocol) in isolation, pending follow up SLP treatment on 3/13/20  2) Dysphagia III Soft and Bite-Sized with HONEY thick liquids (for a trial period to assess for improved respiratory status)/meds with puree/Allow thin water between meals per [employer] Protocol, if Pt is agreeable to aggressive treatment intervention    Referral:Continue Outpatient Speech Therapy for Dysphagia Treatment if Pt is agreeable to aggressive treatment intervention    Strategies: Aspiration precautions; Frequent oral care    Treatments: Speech Therapy for dysphagia treatment is recommendation if Pt is agreeable to aggressive treatment intervention    Consistencies given: Thin, Mildly Thick (Nectar) Liquids, Moderately Thick (honey) Liquids, Puree, Soft solid, Solid    Oral Phase  -Reduced bolus control  -Piecemeal and premature bolus loss to pharynx  -Reduced mastication  -Reduced A-P bolus propulsion with delayed palatal retraction for bolus transfer to pharynx;  Tongue rocking/pumping noted to propel textures > honey thick liquids to pharynx    Pharyngeal Phase  -Delayed swallow onset with increased delays noted with increased textures (ie 16 sec delay with soft solids)  -Pooling to the underside of epiglottis/laryngeal inlet/pyriform sinuses with thins and nectars;

## 2020-03-13 ENCOUNTER — PATIENT MESSAGE (OUTPATIENT)
Dept: FAMILY MEDICINE CLINIC | Age: 85
End: 2020-03-13

## 2020-03-13 ENCOUNTER — HOSPITAL ENCOUNTER (OUTPATIENT)
Dept: SPEECH THERAPY | Age: 85
Setting detail: THERAPIES SERIES
Discharge: HOME | End: 2020-03-13
Payer: MEDICARE

## 2020-03-13 PROCEDURE — 92526 ORAL FUNCTION THERAPY: CPT

## 2020-03-13 NOTE — TELEPHONE ENCOUNTER
From: Deisi Marquez  To: Tatyana Bethea MD  Sent: 3/13/2020 1:19 PM EDT  Subject: Prescription Question    I'm currently doing speech therapy for an aspiration issue. When taking the modified barium swallow test it was noted that I am having issues swallowing dry solid foods such as [employer] crackers. 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 Would you issue a prescription for Nystatin? Please call my wife if you have any questions.  [phone]    Thanks   Amparo

## 2020-03-13 NOTE — FLOWSHEET NOTE
Speech-Language Pathology  Daily Treatment Note    Date:  3/13/2020    Patient Name:  Fernando Machado    :  10/3/1932  MRN: [id number]  Restrictions/Precautions:  Aspiration; fall risk  Diagnosis:  Oral phase dysphagia (R13.11)  Treatment Diagnosis:  Mild-Moderate Oropharyngeal Dysphagia  Insurance/Certification information: Medicare- as medically needed   Referring Physician:  Dr. Barry Williamson of care signed (Y/N): Y; 20  Visit# / total visits:    Pain level: 0/10  Medicare $ including today's session: $1073.67        Progress Note: []  Yes  [x]  No  Next due by: Visit #1/10     Subjective:  Pt's wife accompanied pt to tx session per usual. Pt had re-peat MBS 3/10/20. Results are as follows: \"Results indicate moderate/severe oropharyngeal dysphagia with progressive SILENT laryngeal penetration and SILENT aspiration noted with thin and nectar thick liquids. Initially, Pt demonstrated only SILENT laryngeal penetration with nectar thick liquids in isolation. However, SILENT aspiration also noted with nectar thick liquids in isolation and in combination with other textures over successive po trials. Sensation for reflexive cough is ABSENT in the presence of progressive and gross aspiration with thin and nectar thick liquids. Use of compensatory chin tuck is NOT effective in eliminating SILENT penetration/aspiration with thins and nectars. Pt also noted with evidence of mechanism fatigue over successive po trials which contributes to reduced airway protection and increased episodes of SILENT aspiration with thin and nectar thick liquids as mechanism fatigue occurs. Results of current MBS indicate a decline in oropharyngeal function from prior MBS of 20. Pt's wife reports a decline in Pt's respiratory function over the past month.    Extensive education regarding results of current MBS as well as new recommendations for HONEY thick liquids, explained to the Pt and his wife following this

## 2020-03-19 ENCOUNTER — APPOINTMENT (OUTPATIENT)
Dept: SPEECH THERAPY | Age: 85
End: 2020-03-19
Payer: MEDICARE

## 2020-03-24 ENCOUNTER — APPOINTMENT (OUTPATIENT)
Dept: SPEECH THERAPY | Age: 85
End: 2020-03-24
Payer: MEDICARE

## 2020-03-26 ENCOUNTER — APPOINTMENT (OUTPATIENT)
Dept: SPEECH THERAPY | Age: 85
End: 2020-03-26
Payer: MEDICARE

## 2020-03-30 ENCOUNTER — APPOINTMENT (OUTPATIENT)
Dept: SPEECH THERAPY | Age: 85
End: 2020-03-30
Payer: MEDICARE

## 2020-04-07 RX ORDER — DILTIAZEM HYDROCHLORIDE 120 MG/1
120 CAPSULE, COATED, EXTENDED RELEASE ORAL DAILY
Qty: 90 CAPSULE | Refills: 3 | Status: SHIPPED | OUTPATIENT
Start: 2020-04-07 | End: 2021-01-01 | Stop reason: SDUPTHER

## 2020-04-27 ENCOUNTER — TELEPHONE (OUTPATIENT)
Dept: OTHER | Facility: CLINIC | Age: 85
End: 2020-04-27

## 2020-05-27 NOTE — PROGRESS NOTES
[facility]  Progress Note  Karly Ambrosio MD       Catalina Sharp   YOB: 1932    Date of Visit:  5/27/2020    Assessment/Plan:    1. COPD, severe  Stable  PFT 2/18:  INTERPRETATION:  Spirometry attempts were acceptable and reproducible. FVC  was normal at 3.48 liters, 119% predicted and normal FEV1 of 2.02 liters,  101% predicted. FEV1/FVC ratio was decreased at 58%. Lung volumes showed  normal total lung capacity of 112% predicted. Diffusion capacity showed  decreased DLCO of 48% predicted.     IMPRESSION:  Mild obstructive defect on spirometry with normal lung volumes  but moderate-to-severe decrease in diffusion capacity. In comparison to  the test that was done in 01/2016, no significant change in FVC or FEV1. Total lung capacity has improved by 9% and DLCO decreased by 10%. Mild by PFT but severe by Symptoms    Symbicort  Spiriva  HHN QAM and prn  Albuterol HFA in advance of activity  Mucinex  Prednisone 10 mg per day. Seems to be Prednisone dependent. Discussed long term complications of steroid therapy. Will continue level dose of Prednisone 10 per day. 2. Centrilobular emphysema (HCC)  Stable  CT Chest:8/18    Impression   No significant change in fibrosis which is inconsistent with UIP pattern due   to extensive ground-glass component.  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       Slight interval increase in size of the 8 mm nodule in the left lower lobe. This has been present for greater than 2 years, though consider continued   follow-up imaging given slight increase.       Unchanged severe emphysema. 3. Pulmonary fibrosis (HCC)  Stable  CT Chest: 8/18:  Impression   No significant change in fibrosis which is inconsistent with UIP pattern due   to extensive ground-glass component.  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       Slight interval increase in size of the 8 mm nodule in the left lower lobe.    This has been present for greater than 2 years, though consider continued   follow-up imaging given slight increase.       Unchanged severe emphysema. 4. Chronic respiratory failure with hypoxia (HCC)  Stable  O2 sats are acceptable on supplemental O2. The patient benefits from the use of supplemental O2. He does have a high flow concentrator at home. He uses POC at 5  When he is away from home. His sats are marginal on that.    5. Obstructive sleep apnea  Stable  Wears BiPAP with O2 nightly and benefits from that    6. Pulmonary Nodule  Stable  I have independently reviewed radiographic images for this patient as part of this visit. CT imaging does show improvement in the nodular density on the right. This is most likely inflammatory. FOLLOW UP: 3 months      Chief Complaint   Patient presents with    Shortness of Breath     3 month f. u. Had his CT done       HPI  The patient presents with a chief complaint of severe shortness of breath related to severe COPD of many years duration. He has mild associated cough. He as bronchiectasis and pulmonary fibrosis as a modifying facotr. He has chronic hypoxemic respiratory failure. He has LORA and is on BiPAP as well. No Chest pain, Nausea or vomiting reported    Review of Systems  As documented in HPI     Physical Exam:  Well developed, well nourished  Alert and oriented  Sclera is clear  No cervical adenopathy  No JVD. Chest examination is diffuse wheezing. Cardiac examination reveals regular rate and rhythm without murmur, gallop or rub. The abdomen is soft, nontender and nondistended. There is no clubbing, cyanosis of the extremities. Trace edema  There is no obvious skin rash. No focal neuro deficicts  Normal mood and affect    Allergies   Allergen Reactions    Aspirin Hives and Swelling    Dilaudid [Hydromorphone Hcl] Other (See Comments)     Severe hallucination     Prior to Visit Medications    Medication Sig Taking?  Authorizing Provider   simvastatin (ZOCOR) 20 MG

## 2020-06-18 NOTE — PATIENT INSTRUCTIONS
meals. It is a good, quick way to make sure that you have a balanced meal. It also helps you spread carbs throughout the day. ? Divide your plate by types of foods. Put non-starchy vegetables on half the plate, meat or other protein food on one-quarter of the plate, and a grain or starchy vegetable in the final quarter of the plate. To this you can add a small piece of fruit and 1 cup of milk or yogurt, depending on how many carbs you are supposed to eat at a meal.  · Try to eat about the same amount of carbs at each meal. Do not \"save up\" your daily allowance of carbs to eat at one meal.  · Proteins have very little or no carbs per serving. Examples of proteins are beef, chicken, turkey, fish, eggs, tofu, cheese, cottage cheese, and peanut butter. A serving size of meat is 3 ounces, which is about the size of a deck of cards. Examples of meat substitute serving sizes (equal to 1 ounce of meat) are 1/4 cup of cottage cheese, 1 egg, 1 tablespoon of peanut butter, and ½ cup of tofu. How can you eat out and still eat healthy? · Learn to estimate the serving sizes of foods that have carbohydrate. If you measure food at home, it will be easier to estimate the amount in a serving of restaurant food. · If the meal you order has too much carbohydrate (such as potatoes, corn, or baked beans), ask to have a low-carbohydrate food instead. Ask for a salad or green vegetables. · If you use insulin, check your blood sugar before and after eating out to help you plan how much to eat in the future. · If you eat more carbohydrate at a meal than you had planned, take a walk or do other exercise. This will help lower your blood sugar. What else should you know? · Limit saturated fat, such as the fat from meat and dairy products. This is a healthy choice because people who have diabetes are at higher risk of heart disease. So choose lean cuts of meat and nonfat or low-fat dairy products.  Use olive or canola oil instead of may help you in other ways too. It can help you reach and stay at a healthy weight. It also helps improve blood pressure and cholesterol, which can reduce the risk of heart disease. Exercise can make you feel stronger and happier. It can help you relax and sleep better, and give you confidence in other things you do. How can you exercise safely? Before you start a new exercise program, talk to your doctor about how and when to exercise. You may need to have a medical exam and tests before you begin. Some types of exercise can be harmful if your diabetes is causing other problems, such as problems with your feet. Your doctor can tell you what types of exercise are good choices for you. These tips can help you exercise safely when you have diabetes. If your diabetes is controlled by diet or medicine that doesn't lower your blood sugar, you don't need to eat a snack before you exercise. · Check your blood sugar before you exercise. And be careful about what you eat. ? If your blood sugar is less than 100, eat a carbohydrate snack before you exercise. ? Be careful when you exercise if your blood sugar is over 300. High blood sugar can make you dehydrated. And that makes your blood sugar levels go even higher. If you have ketones in your blood or urine and your blood sugar is over 300, do not exercise. · Don't try to do too much at first. Build up your exercise program bit by bit. Try to get at least 30 minutes of exercise on most days of the week. Walking is a good choice. You also may want to do other activities, such as riding a bike or swimming. You might try running or gardening. Try to include muscle-strengthening exercises at least 2 times a week. These exercises include push-ups and weight training. You can also use rubber tubing or stretch bands. You stretch or pull the tubing or band to build muscle strength. If you want to exercise more, slowly increase how hard or long you exercise.   · You may get symptoms of low blood sugar during exercise or up to 24 hours later. Some symptoms of low blood sugar, such as sweating, a fast heartbeat, or feeling tired, can be confused with what can happen anytime you exercise. Other symptoms may include feeling anxious, dizzy, weak, or shaky. So it's a good idea to check your blood sugar again. · You can treat low blood sugar by eating or drinking something that has 15 grams of carbohydrate. These should be quick-sugar foods. Quick-sugar foods such as glucose tablets, table sugar, honey, fruit juice, regular (not diet) soda pop, or hard candy can help raise blood sugar. Check your blood sugar level again 15 minutes after having a quick-sugar food to make sure your level is getting back to your target range. · Drink plenty of water before, during, and after you exercise. · Wear medical alert jewelry that says you have diabetes. You can buy this at most drugstores. · Pay attention to your body. If you are used to exercise and notice that you can't do as much as usual, talk to your doctor. Follow-up care is a key part of your treatment and safety. Be sure to make and go to all appointments, and call your doctor if you are having problems. It's also a good idea to know your test results and keep a list of the medicines you take. Where can you learn more? Go to https://[employer]. org and sign in to your [employer] account. Enter C330 in the [facility] box to learn more about \"Learning About Diabetes and Exercise. \"     If you do not have an account, please click on the \"Sign Up Now\" link. Current as of: December 20, 2019               Content Version: 12.5  © 6793-2265 [employer], [employer]. Care instructions adapted under license by [facility] ([facility]).  If you have questions about a medical condition or this instruction, always ask your healthcare professional. [address] any warranty or liability for your use of this information.

## 2020-06-23 NOTE — PROGRESS NOTES
Speech Language Pathology  Facility/Department: [facility] SPEECH THERAPY   CLINICAL BEDSIDE SWALLOW EVALUATION    NAME: Sarwat Soliz  : 10/3/1932  MRN: [id number]  Onset Date: 20  Date of Eval: 2020  Evaluating Therapist: Sal Sims  Pain:  Stomach discomfort- denied intervention     Insurance/Certification Information:  Medicare- as medically necessary   Plan of Care Submitted: (y/n) Yes -faxed   Vist # / total visits:    Referring MD: Casandra Grewal MD     Past Medical History:  has a past medical history of Arthritis, Emphysema of lung ([address] Full dentures, Hearing aid worn, Hypertension, On home oxygen therapy, Pulmonary fibrosis ([address] and Sleep apnea. Past Surgical History:  has a past surgical history that includes Abdominal aortic aneurysm repair (2009); Carpal tunnel release (Right, [id number]); Cataract removal (Bilateral, ); Finger amputation (Right, ); ventral hernia repair (2009); and Skin cancer excision (2019). Recent Chest Xray/CT of Chest:  20:   1. Pulmonary nodule described previously right upper lobe measures smaller   and is almost certainly on a postinfectious/inflammatory basis.  Continued CT   surveillance recommended in 6-12 months. 2. Severe emphysema and sequela from pulmonary fibrosis similar to prior   exams. 3. Mucous plugging within the bronchus to the lingula left upper lobe. 4. Main pulmonary artery dilatation which can be seen with pulmonary   hypertension but is nonspecific. 5. Calcific atherosclerosis aorta and coronary arteries. 6. Cardiomegaly.         Recent MBS completed on 3/10/2020:   \"Modified Barium Swallow evaluation completed on 3/10/2020. Results indicate moderate/severe oropharyngeal dysphagia with progressive SILENT laryngeal penetration and SILENT aspiration noted with thin and nectar thick liquids. Initially, Pt demonstrated only SILENT laryngeal penetration with nectar thick liquids in isolation.  However, meals. Pt had chest CT completed on 5/20/20 (see results). Pt has denied any difficulty with eating/drinking nor change in pulmonary function since his last visit. Impression  Dysphagia Diagnosis: Mild to moderate oral stage dysphagia;Mild to moderate pharyngeal stage dysphagia  Dysphagia Impression : Pt presents with mild to moderate oropharyngeal dysphagia related to COPD and pulmonary function. Pt demonstrated signs of premature bolus loss of thin liquids, prolonged mastication of solids, delayed and reduced laryngeal elevation with all presentations. Delayed coughing was noted however unable to attribute to oral intake, appeared related to COPD/chronic cough. Intermittent throat clearing was noted after intake of regular solids, suspect pharyngeal residue. Pt's O2 at 92% after oral intake during assessment. Given that pt has been tolerating a thin liquid and soft and bite sized diet with modified feeding patterns (slow, rest breaks, supraglottic swallow), and chest CT remains stable, recommend to remain on that diet with ongoing use of aspiration precautions. Provided detailed education to pt and pt's wife re aspiration risks, dysphagia correlation to COPD, and treatment plan; both verbalized understanding and agreement. Treatment Plan  Requires SLP Intervention: Yes  Duration/Frequency of Treatment: 1x week; 4-6 weeks; or until goals met     Recommended Diet and Intervention  Solid:  Regular;Dysphagia Soft and Bite-Sized (Dysphagia III)(Allow small amounts of regular textures with natural modifications (i.e. salads with dressing to add moisture) but continue to restrict mixed consistencies. Liquid:  Thin  Medication: Meds in puree(Reviewed alternative puree options to use for medications as pt reported applesauce causing increased GERD)  Therapeutic Interventions: Vital Stim/NMES, Bolus control exercises, Laryngeal exercises, Patient/Family education, Pharyngeal exercises, Diet tolerance monitoring,

## 2020-07-13 NOTE — FLOWSHEET NOTE
Speech-Language Pathology  Daily Treatment Note    Date:  2020    Patient Name:  Jerzy Huntley    :  10/3/1932  MRN: [id number]  Restrictions/Precautions: n/a   Diagnosis:   Oropharyngeal Dysphagia   Treatment Diagnosis:  Oropharyngeal Dysphagia   Insurance/Certification information:  Medicare- as medically necessary   Referring Physician:  Stephenie Arriaza MD   Plan of care signed (Y/N):  N  Visit# / total visits:  3/10  Pain level: 0/10  Medicare $ including today's session: $259.23         Progress Note: []  Yes  [x]  No  Next due by: Visit #10     Subjective:  Pt and wife present for visit. Alert and willing to participate. Reported improved tolerance while drinking milk and is completing home exercises 1-2 times per day. O2 Remained >87% during session per home pulse oximeter. Objective:   Goal 1: Pt will tolerate recommended diet and (thin liquids and regular-soft solids) with no overt s/s of aspiration, noticeable fatigue or decline in pulmonary status. - Pt tolerated thin liquid via bottle, dry and soft solid without overt s/s of aspiration;    - intermittent congested coughing noted prior to trial and upon completion of excise with production of phlegm; did not appear to correlate with po intake    - targeted via oropharyngeal strengthening: pudding via straw x3 (encouraged to increase resistance with pinch of straw); CTAR x10 (use of stressball and towel)      Goal 2: The patient/caregiver will demonstrate understanding of compensatory strategies for improved swallowing safety.    - provided education at length to pt and wife re correlation of dysphagia, aspiration risk and pulmonary function; long term risk for dysphagia based upon pulmonary function    - provided education re modifying textures to meet dietary wants/needs while reducing aspiration risks (draining mixed consistencies)     Assessment:   Pt continues to present with oropharyngeal dysphagia and at elevated risk for aspiration related to pulmonary function. Pt continues to benefit from oropharyngeal strengthening to maintain muscle strength and ensure adequate airway protection to reduce risk for episodic aspiration. Completing home exercise program and improving diet tolerance. May d/c after next visit. Plan:   1x week; 4-6 weeks; or until goals met - Pt reported plan that he will not be able to attend next week, visit planned for 2 weeks out     Timed Code Treatment:   0    Total Treatment Time:   45 minutes     Signature:   Urszula Alejo M.A. CCC-SLP [employer] [id number]  Speech-Language Pathologist [phone]  7/13/2020 12:58 PM     Note: If patient does not return for scheduled/ recommended follow up visits, this note will serve as a discharge from care along with most recent update on progress.

## 2020-07-27 NOTE — PROGRESS NOTES
Outpatient Speech Therapy   Phone: [phone] Fax: [phone]    Speech Therapy Discharge Note  Date: 2020        Patient Name:  Helena Cuevas    :  10/3/1932  MRN: [id number]  Restrictions/Precautions: n/a   Diagnosis:   Oropharyngeal Dysphagia   Treatment Diagnosis:  Oropharyngeal Dysphagia   Insurance/Certification information:  Medicare- as medically necessary   Referring Physician:  Abran Gomes MD   Plan of care signed (Y/N):  N; Faxed 20  Visit# / total visits:  3/10  Pain level:      0/10  Medicare $ including today's session: $1422.40 (fixed from previous tx note)        Time Period for Report:  20-20  Cancels/No-shows to date:  0    Plan of Care/Treatment to date:  [] Speech-Language Evaluation/Treatment    [x] Dysphagia Evaluation/Treatment        [x] Dysphagia Treatment via Neuromuscular Electrical Stimulation (NMES)   [] Modified Barium Swallowing Study   [] Cognitive-Linguistic Skills Development  [] Voice evaluation and Treatment      [] Evaluation, modification, and Training of Voice Prosthetic     [] Evaluation for Speech-Generating Augmentative and Alternative Communication Device   [] Therapeutic Services for the use of Speech-Generating Device. [] Other:     Significant Findings At Last Visit/Comments:    Subjective: Pt's wife accompanied pt to tx session per usual.  Discussed discharge from dysphagia tx due to demonstration of diet tolerance and ability to complete laryngeal/pharyngeal exercises independently. Pt verbalized preference for discharge from tx. Objective:   Test measurements:     Recent Chest Xray/CT of Chest:  20:   1. Pulmonary nodule described previously right upper lobe measures smaller   and is almost certainly on a postinfectious/inflammatory basis.  Continued CT   surveillance recommended in 6-12 months. 2. Severe emphysema and sequela from pulmonary fibrosis similar to prior   exams.    3. Mucous plugging within the bronchus to the lingula left upper lobe. 4. Main pulmonary artery dilatation which can be seen with pulmonary   hypertension but is nonspecific. 5. Calcific atherosclerosis aorta and coronary arteries. 6. Cardiomegaly.          Recent MBS completed on 3/10/2020:   \"Modified Barium Swallow evaluation completed on 3/10/2020.  Results indicate moderate/severe oropharyngeal dysphagia with progressive SILENT laryngeal penetration and SILENT aspiration noted with thin and nectar thick liquids. Initially, Pt demonstrated only SILENT laryngeal penetration with nectar thick liquids in isolation. However, SILENT aspiration also noted with nectar thick liquids in isolation and in combination with other textures over successive po trials. Sensation for reflexive cough is ABSENT in the presence of progressive and gross aspiration with thin and nectar thick liquids. Use of compensatory chin tuck is NOT effective in eliminating SILENT penetration/aspiration with thins and nectars. Pt also noted with evidence of mechanism fatigue over successive po trials which contributes to reduced airway protection and increased episodes of SILENT aspiration with thin and nectar thick liquids as mechanism fatigue occurs. Results of current MBS indicate a decline in oropharyngeal function from prior MBS of 1/31/20. Pt's wife reports a decline in Pt's respiratory function over the past month. Extensive education regarding results of current MBS as well as new recommendations for HONEY thick liquids, explained to the Pt and his wife following this study. Pt initially stated \"I won't take those thickened liquids\" when recommendations explained to him.  However, when further education regarding impact of respiratory status on airway protection and potential decline in function was explained to the Pt, he and his wife verbalized understanding and potential agreement with trial of honey thick liquids to assess for potential improvement in respiratory status. Pt is scheduled for a follow up Outpatient SLP treatment on Friday, 3/13 with further diet and treatment recommendations to resume at that time. \"    Assessment:   Summary: Pt has met established tx goals. At time of discharge, patient exhibiting mild to moderate oropharyngeal dysphagia related to COPD and pulmonary function with signs of premature bolus loss of thin liquids, prolonged mastication of solids, and delayed and reduced laryngeal elevation with all textures. Pt tolerating Dysphagia III (soft and bite-sized) diet and thin liquids with no overt clinical s/s of aspiration/penetration. Pt able to recall and demonstrate home exercise program and denies further difficulty with current diet. Recommend continue strict use of aspiration precautions including 1) upright for meals, 2) small bites/sips, 3) No straws, 4) No mixed consistencies (i.e. Chicken noodle soup, cereal with milk, pill with water, etc.). Extensive education provided regarding risk of aspiration, especially in times of respiratory compromise. Pt and wife verbalized understanding. Therefore, planned discharge from speech therapy services completed this date. Patient's response to treatment: Good    Progress towards goals:    Goal 1: Pt will tolerate recommended diet and (thin liquids and regular-soft solids) with no overt s/s of aspiration, noticeable fatigue or decline in pulmonary status.                 - Pt tolerated 4 oz of thin liquid via bottle with no overt clinical s/s of aspiration/penetration.     - Pt tolerated 10/10 trials of soft solids with no overt clinical s/s of aspiration/penetration.              - Pt continues with intermittent congested coughing prior to trial and upon completion of excise with production of phlegm; did not appear to correlate with po intake   GOAL MET                            Goal 2: The patient/caregiver will demonstrate understanding of compensatory strategies for improved swallowing safety.              - Extensive education provided to pt and wife re: correlation of dysphagia, aspiration risk and pulmonary function; long term risk for dysphagia based upon pulmonary function               - provided education re: modifying textures to meet dietary wants/needs while reducing aspiration risks (draining mixed consistencies)   GOAL MET     Other Treatment:  - targeted via oropharyngeal strengthening: pudding via straw x3 (encouraged to increase resistance with pinch of straw); CTAR x10 (use of stressball)    Current Frequency/Duration:  # Days per week: [x] 1 day # Weeks: [] 1 week [x] 4 weeks      [] 2 days   [] 2 weeks [x] 5 weeks      [] 3 days   [] 3 weeks [x] 6 weeks     Rehab Potential: [] Excellent [x] Good [] Fair  [] Poor     Goal Status:  [x] Achieved [] Partially Achieved  [] Not Achieved     Patient Status: [] Continue per initial plan of Care     [x] Patient now discharged      [] Additional visits requested, Please re-certify for additional visits:      Requested frequency/duration:  X/week for weeks    Electronically signed by:  ELIZABETH Arnold    If you have any questions or concerns, please don't hesitate to call. Thank you for your referral.    Physician Signature:________________________________Date:__________________  By signing above, therapists plan is approved by physician    Note: If patient does not return for scheduled/ recommended follow up visits, this note will serve as a discharge from care along with most recent update on progress.

## 2020-08-10 NOTE — PROGRESS NOTES
[facility] - Swallowing      Patient: Nitish Sánchez  : 10/3/1932  MRN: [id number]  Date: 8/10/2020  Electronically Signed by: Lazara Madrid MA, CCC-SLP       Note: In Levels 3-5, some patients may meet only one of the \"and/or\" criteria listed. If you have difficulty deciding on the most appropriate level for an individual, use dietary level as the most important criterion if the dietary level is the result of swallow function rather than dentition only. Dietary levels at [facility] 6 and 7 should be judged only on swallow function, and any influence of poor dentition should be disregarded. []  Level 1 Individual is not able to swallow anything safely by mouth. All nutrition and hydration is received through non-oral means (e.g., nasogastric tube, PEG)    []  Level 2 Individual is not able to swallow safely by mouth for nutrition and hydration, but may take some consistency with consistent maximal cues in therapy only. Alternative method of feeding required    []  Level 3  Alternative method of feeding required as individual takes less than 50% of nutrition and hydration by mouth, and/or swallowing is safe with consistent use of moderate cues to use compensatory strategies and/or requires maximum diet restriction    []  Level 4 Swallowing is safe, but usually requires moderate cues to use compensatory strategies, and/or the individual has moderate diet restrictions and /or still requires tube feeding and/or oral supplements    []  Level 5 Swallowing is safe with minimal diet restriction and/or occasionally requires minimal cueing to use compensatory strategies. The individual may occasionally self-cue. All nutrition and hydration needs are met by mouth at mealtime    [x]  Level 6 Swallowing is safe, and the individual eats and drinks independently and may rarely require minimal cueing. The individual usually self-cues when difficulty occurs.   May need to avoid specific food items (e.g., popcorn and nuts), or require additional time (due to dysphasia)    []  Level 7 The individual's ability to eat independently is not limited by swallow function. Swallowing would be safe and efficient for all consistencies. Compensatory strategies are effectively used when needed       Diet levels/restrictions are defined on next page. Please use levels as a guide in scoring this FCM                                        Swallowing: Dietary Levels/Restrictions  · Maximum Restrictions: Diet is two or more levels below a regular diet status and liquid consistency  · Moderate Restrictions: Diet is two or more levels below a regular diet status in either solid or liquid consistency (but not both), OR diet is one level below in both solid and liquid consistency   · Minimum Restrictions: Diet is one level below a regular diet status in solid or liquid consistency    Solids  · Regular: No restrictions  · Reduced One Level: Meats are cooked until soft, with not tough or stringy foods. Might include meats like meat loaf, baked fish, and soft chicken. Vegetables are cooked soft  · Reduced Two Levels: Meats are chopped or ground. Vegetables are one consistency (e.g., souffle, baked potato) or are mashed with a fork  · Reduced Three Levels: Meats and vegetables are pureed    Liquids  · Regular: Thin liquids;  No restrictions  · Reduced One Level: Nectar, syrup; mildly thick    · Reduced Two Levels: Honey; moderately thick  · Reduced Three Levels: Pudding; extra thick

## 2020-09-01 NOTE — PROGRESS NOTES
Patient is being seen Virtually using [employer] [facility]. Patient is at home and Dr. Kadi Mata is at the home. The patient has consented to having a virtual visit due to the [place] 19 pandemic. Vitals are patient reported. Chief Complaint   Patient presents with    Anxiety       Patient's wife states his hands been shaking. Patient stated he did not feel good this past Sunday but he could not tell wife what was really wrong. Patient is now wearing compression socks. Patient's wife states whenever he bumps against something, he gets a major bruise and he bleeds easily. States noticing more hand shaking, has notice more off and on the past month or so. Also noticing more anxiety. If something doesn't go right will tense up, make hands into fists. More irritable and frustrated. Having dif voicing how he is feeling during these times which makes him more anxious. States it is worse in the morning, once up and moving around seems. States he doesn't feel like he is depressed. Prednisone increased Feb/March. Has also not been feeling as well past couple months. Sleeping a lot more. Oxygen is dropping more with activity and sometimes even sitting. Has been having more throat clearing since yesterday and c/o that there is something sticky in his throat and hard to get up but it is clear when comes up. Having to take more deep breaths and taking longer to recover. Takes mucinex  nightly. Hasn't been coughing. Patient's medications, allergies, past medical, surgical, social and family histories were reviewed and updated in the EHR as appropriate. There were no vitals filed for this visit. Wt Readings from Last 3 Encounters:   06/18/20 189 lb (85.7 kg)   02/26/20 188 lb 12.8 oz (85.6 kg)   01/31/20 185 lb 6.4 oz (84.1 kg)     There is no height or weight on file to calculate BMI.   PHQ Scores 12/10/2019 11/30/2018 11/30/2017   PHQ2 Score 0 0 0   PHQ9 Score 0 0 0     Patient-Reported Vitals 9/2/2020 Patient-Reported Systolic 653   Patient-Reported Diastolic 60   Patient-Reported Pulse 88   Patient-Reported Temperature 99.0   Patient-Reported SpO2 95          GEN: Alert and oriented x 4 NAD, affect appropriate and obese, well developed. Sob with talking but baseline        ASSESSMENT AND PLAN:       Dorine Leyden was seen today for anxiety. Diagnoses and all orders for this visit:    Anxiety  -     escitalopram (LEXAPRO) 5 MG tablet; Take 1 tablet by mouth daily  -     busPIRone (BUSPAR) 5 MG tablet; Take 1 tablet by mouth 3 times daily  Start lexapro and buspar  Recheck 1 month      COPD, severe ([facility] [address]  ? Early exacerbation  Monitor sx  Start abx if worsening            Pursuant to the emergency declaration under the [facility] waiver authority and the [employer] and [employer] Act, this Virtual  Visit was conducted, with patient's consent, to reduce the patient's risk of exposure to COVID-19 and provide continuity of care for an established patient. Services were provided through a video synchronous discussion virtually to substitute for in-person clinic visit. Patient was instructed that the AVS is available on My Chart or was emailed to the patient if not on My Chart. Lab orders were emailed to patient if they do not use a [facility] lab. Any work notes were sent to patient through My Chart or email.

## 2020-09-08 NOTE — PROGRESS NOTES
Amparo Dobbs received a viral test for COVID-19. They were educated on isolation and quarantine as appropriate. For any symptoms, they were directed to seek care from their PCP, given contact information to establish with a doctor, directed to an urgent care or the emergency room.

## 2020-09-08 NOTE — PROGRESS NOTES
2020  Franchesca Dobbs (:  10/3/1932)    Allergies: Allergies   Allergen Reactions    Aspirin Hives and Swelling    Dilaudid [Hydromorphone Hcl] Other (See Comments)     Severe hallucination         FLU/RESPIRATORY/COVID-19 CLINIC EVALUATION    HPI:   Chief Complaint   Patient presents with    Cough        SYMPTOMS:    INSTRUCTIONS:  \"[x]\" Indicates a positive item  \"[]\" Indicates a negative item      [] Denies Fever, Cough, SOB, Loss of Taste or Smell, Body Aches, Diarrhea      Symptom duration, days:  [] 1   [] 2   [] 3   [x] 4 - 7 (Tuesday)   [] 8 - 10   [] 11 - 13   [] >14    [x] Fevers    [] Symptom (not measured)  [x] Measured (Result: 99.7 degrees)  [] Chills  [x] Cough [] Dry [x] Productive- thick clear/white   [x]Loss of Taste (just tastes bad  [] Loss of Smell  []Decreased Appetite  [] Coughing up blood  }  [] Chest Congestion  [] Nasal Congestion  [] Runny  Nose  [] Sneezing  [] Feeling short of breath   []Sometimes    [] Frequently    [] All the time     [] Chest pain     [x] Headaches  []Tolerable  [] Severe     [x] Fatigue  [x] Sore throat- in AM, better today  [] Muscle aches  [] Nausea  [] Vomiting  []Unable to keep fluids down     [] Diarrhea  [] Mild  []Severe         [] OTHER SYMPTOMS:  Over weekend feet looked purple but better today  Started doxy on Saturday      Symptom course:   [] Worsening     [] Stable     [x] Improving - seems better today    RISK FACTORS:1INSTRUCTIONS:  \"[x]\" Indicates a positive item. Negative  for risk factors if not checked.     [] Close contact with a lab confirmed COVID-19 patient within 14 days of symptom onset  [] History of travel from affected geographical areas within 14 days of symptom onset        PHYSICAL EXAMINATION:    Vitals:    20 1657   Pulse: 64   Temp: 98.2 °F (36.8 °C)   SpO2: 98%            [x] Alert  [x] Oriented to person/place/time    [x] No apparent distress   [] Toxic appearing  [] Face flushed appearing     [x] Normal Mood  [] Anxious appearing      [] Sclera clear    [x] Pinna, TMs,  Canals normal bilaterally  [] TM Red  [] Right [] Left [] Bilateral  [] TM Bulging [] Right [] Left []  Billateral    [x] Oropharynx [] Clear [] Red [x] Exudate  (tongue black) [] Swollen    [] No adenopathy [] Adenopathy __________    [] Lungs clear with good movement and effort  [x] Breathing appears normal     [x] Speaks in complete sentences  [x] Appears tachypneic   [] Wheezing           [] Rhonchi   [x] Decreased but clear    [x] CV RRR  [] No Murmur  [] Murmur  [] Irregular  [] Tachycardic    [] OTHER:  1}      TESTS ORDERED:    [] POCT FLU  [] POCT STREP  [] COVID-19 Test sent  [x] Appointment made at testing clinic for patient to get a COVID test. (sent this AM)      TEST RESULTS:    POCT FLU test:  [] Positive  [] Negative  POCT STREP test:  [] Positive  [] Negative    ASSESSMENT:  [] Allergic Rhinitis  [] Asthma Exacerbation  [] Bronchitis  [x] COPD Exacerbation  [] Gastroenteritis  [] Influenza  [] Sinusitis  [] Strep Throat [] Sore Throat  [] Viral URI   [] Possible COVID-19   [] Exposure to COVID -19  [] Positive for COVID  [] Screening for Viral Disease (COVID test no sx)        Christensen was seen today for cough. Diagnoses and all orders for this visit:    COPD with acute exacerbation ([facility] [address]  Finish doxy  Call if worsening  Sat ok and not wheezing so no change in steroids for now    Other orders  -     doxycycline hyclate (VIBRA-TABS) 100 MG tablet;  Take 1 tablet by mouth 2 times daily for 10 days

## 2020-09-09 NOTE — TELEPHONE ENCOUNTER
Jordana called about doxycycline hyclate (VIBRA-TABS) 100 MG tablet     They state the qty & sig does not match. They want to know if it needs to be for 14 days or #20.      Provider out of office      Please advise

## 2020-10-01 NOTE — PROGRESS NOTES
Vaccine Information Sheet, \"Influenza - Inactivated\"  given to Janet Sparrow, or parent/legal guardian of  Janet Sparrow and verbalized understanding. Patient responses:    Have you ever had a reaction to a flu vaccine? No  Do you have any current illness? No  Have you ever had Guillian Vineland Syndrome? No  Do you have a serious allergy to any of the follow: Neomycin, Polymyxin, Thimerosal, eggs or egg products? No    Flu vaccine given per order. Please see immunization tab. Risks and benefits explained. Current VIS given.       Immunizations Administered     Name Date Dose Route    Influenza, Quadv, adjuvanted, 65 yrs +, IM, PF (Fluad) 10/1/2020 0.5 mL Intramuscular    Site: Deltoid- Right    Lot: [id number]    NDC: 69426-477-18

## 2020-10-07 PROBLEM — F41.9 ANXIETY: Status: ACTIVE | Noted: 2020-01-01

## 2020-10-07 NOTE — PROGRESS NOTES
MG/3ML) 0.083% nebulizer solution; Take 3 mLs by nebulization every 6 hours as needed for Wheezing DX J44.9, J43.2, J84.10                Pursuant to the emergency declaration under the [facility], [facility] waiver authority and the [employer] Act, this Virtual  Visit was conducted, with patient's consent, to reduce the patient's risk of exposure to COVID-19 and provide continuity of care for an established patient. Services were provided through a video synchronous discussion virtually to substitute for in-person clinic visit. Patient was instructed that the AVS is available on My Chart or was emailed to the patient if not on My Chart. Lab orders were emailed to patient if they do not use a [facility] lab. Any work notes were sent to patient through My Chart or email.        Note per IVELISSE Rhodes and Scribe with corrections and edits per Edie Olszewski, MD.  I agree with entirety of note and was present and performed history and physical.  I also confirm that the note above accurately reflects all work, treatment, procedures, and medical decision making performed by me, Edie Olszewski, MD

## 2020-11-24 NOTE — TELEPHONE ENCOUNTER
From: Zeeshan Graff  To: Marlon Funk MD  Sent: 11/24/2020 7:07 AM EST  Subject: Prescription Question    Christensen is in need of a prescription refill of Buspirone 5mg. Currently it is not on his list of medications. Would you please add and call one in.     Thanks for your help     Amisha

## 2020-11-25 NOTE — PROGRESS NOTES
[facility]  Progress Note  Fred Richards MD       Mesfin Ramírez   YOB: 1932    Date of Visit:  11/25/2020    Assessment/Plan:    1. COPD, severe  Stable  PFT 2/18:  INTERPRETATION:  Spirometry attempts were acceptable and reproducible. FVC  was normal at 3.48 liters, 119% predicted and normal FEV1 of 2.02 liters,  101% predicted. FEV1/FVC ratio was decreased at 58%. Lung volumes showed  normal total lung capacity of 112% predicted. Diffusion capacity showed  decreased DLCO of 48% predicted.     IMPRESSION:  Mild obstructive defect on spirometry with normal lung volumes  but moderate-to-severe decrease in diffusion capacity. In comparison to  the test that was done in 01/2016, no significant change in FVC or FEV1. Total lung capacity has improved by 9% and DLCO decreased by 10%. Mild by PFT but severe by Symptoms    Symbicort  Spiriva  HHN QAM and prn  Albuterol HFA in advance of activity  Mucinex  Prednisone 20 mg per day. Seems to be Prednisone dependent. Now on 20 mg/day    2. Centrilobular emphysema (HCC)  Stable  CT Chest:5/20:       Impression    1. Pulmonary nodule described previously right upper lobe measures smaller    and is almost certainly on a postinfectious/inflammatory basis.  Continued CT    surveillance recommended in 6-12 months. 2. Severe emphysema and sequela from pulmonary fibrosis similar to prior    exams. 3. Mucous plugging within the bronchus to the lingula left upper lobe. 4. Main pulmonary artery dilatation which can be seen with pulmonary    hypertension but is nonspecific. 5. Calcific atherosclerosis aorta and coronary arteries. 6. Cardiomegaly. 3. Pulmonary fibrosis (HCC)  Stable  CT Chest: 5/20:     Impression    1.  Pulmonary nodule described previously right upper lobe measures smaller    and is almost certainly on a postinfectious/inflammatory basis.  Continued CT    surveillance recommended in 6-12 months. 2. Severe emphysema and sequela from pulmonary fibrosis similar to prior    exams. 3. Mucous plugging within the bronchus to the lingula left upper lobe. 4. Main pulmonary artery dilatation which can be seen with pulmonary    hypertension but is nonspecific. 5. Calcific atherosclerosis aorta and coronary arteries. 6. Cardiomegaly. 4. Chronic respiratory failure with hypoxia (HCC)  Stable  O2 sats are acceptable on supplemental O2. The patient benefits from the use of supplemental O2. He does have a high flow concentrator at home. He is on 8 LPM and still drops when he moves around    5. Obstructive sleep apnea  Stable  Wears BiPAP with O2 nightly and benefits from that    6. Pulmonary Nodule  Stable  I have independently reviewed radiographic images for this patient as part of this visit. CT imaging does show improvement in the nodular density on the right. This is most likely inflammatory. FOLLOW UP: 3 months      Chief Complaint   Patient presents with    COPD       HPI  The patient presents with a chief complaint of severe shortness of breath related to severe COPD of many years duration. He has mild associated cough. He as bronchiectasis and pulmonary fibrosis as a modifying facotr. He has chronic hypoxemic respiratory failure. He has LORA and is on BiPAP as well. He went on Abx a couple days ago because of thick yellow sputum. No Chest pain, Nausea or vomiting reported    Review of Systems  As documented in HPI     Physical Exam:  Well developed, well nourished  Alert and oriented  Sclera is clear  No cervical adenopathy  No JVD. Chest examination is diffuse wheezing. Cardiac examination reveals regular rate and rhythm without murmur, gallop or rub. The abdomen is soft, nontender and nondistended. There is no clubbing, cyanosis of the extremities. Trace edema  There is no obvious skin rash.   No focal neuro deficicts  Normal mood and affect    Allergies   Allergen Reactions    Aspirin Hives and Swelling    Dilaudid [Hydromorphone Hcl] Other (See Comments)     Severe hallucination     Prior to Visit Medications    Medication Sig Taking?  Authorizing Provider   busPIRone (BUSPAR) 5 MG tablet Take 1 tablet by mouth 3 times daily Yes Savannah Shabazz MD   albuterol (PROVENTIL) (2.5 MG/3ML) 0.083% nebulizer solution Take 3 mLs by nebulization every 6 hours as needed for Wheezing DX J44.9, J43.2, J84.10 Yes Savannah Shabazz MD   escitalopram (LEXAPRO) 5 MG tablet Take 1 tablet by mouth daily Yes Savannah Shabazz MD   melatonin 3 MG TABS tablet Take 5 mg by mouth daily Yes Historical Provider, MD   predniSONE (DELTASONE) 20 MG tablet Take 20 mg by mouth daily Yes Historical Provider, MD   simvastatin (ZOCOR) 20 MG tablet Take 1 tablet by mouth nightly Yes Shraifa Chase APRN - CNP   metoprolol tartrate (LOPRESSOR) 25 MG tablet Take 1 tablet by mouth 2 times daily Yes Claudia Montoya MD   dilTIAZem (CARDIZEM CD) 120 MG extended release capsule Take 1 capsule by mouth daily Yes Claudia Montoya MD   SYMBICORT 160-4.5 MCG/ACT AERO Inhale 2 puffs into the lungs 2 times daily Yes Skylar Li MD   allopurinol (ZYLOPRIM) 100 MG tablet TAKE ONE TABLET BY MOUTH DAILY Yes Savannah Shbaazz MD   clopidogrel (PLAVIX) 75 MG tablet Take 1 tablet by mouth daily Yes Savannah Shabazz MD   lisinopril (PRINIVIL;ZESTRIL) 5 MG tablet Take 1 tablet by mouth daily Yes Savannah Shabazz MD   alendronate (FOSAMAX) 70 MG tablet Take 1 tablet by mouth every 7 days Yes Savannah Shabazz MD   albuterol sulfate HFA (PROAIR HFA) 108 (90 Base) MCG/ACT inhaler Inhale 2 puffs into the lungs daily as needed for Shortness of Breath Yes Savannah Shabazz MD   tiotropium (SPIRIVA HANDIHALER) 18 MCG inhalation capsule Inhale 1 capsule into the lungs daily Yes Savannah Shabazz MD   ONETOUCH VERIO strip USE TO TEST DAILY Yes Savannah Shabazz MD   calcium carbonate 600 MG TABS tablet Take 1 tablet by mouth daily  Yes Historical Provider, MD Allan White LANCETS 88S MISC Use to check blood sugar once daily. E11.9 Yes Edie Olszewski, MD   Zinc 50 MG TABS Take 50 mg by mouth daily. Yes Historical Provider, MD   Ascorbic Acid (VITAMIN C) 500 MG tablet Take 500 mg by mouth daily. Yes Historical Provider, MD   VITAMIN D, ERGOCALCIFEROL, PO Take 5,000 Units by mouth Daily  Yes Historical Provider, MD   Cyanocobalamin (VITAMIN B 12 PO) Take 500 mcg by mouth daily  Yes Historical Provider, MD       There were no vitals filed for this visit. There is no height or weight on file to calculate BMI.      Wt Readings from Last 3 Encounters:   20 189 lb (85.7 kg)   20 188 lb 12.8 oz (85.6 kg)   20 185 lb 6.4 oz (84.1 kg)     BP Readings from Last 3 Encounters:   20 (!) 110/56   20 100/61   20 111/60        Social History     Tobacco Use   Smoking Status Former Smoker    Packs/day: 2.00    Years: 50.00    Pack years: 100.00    Last attempt to quit: 2001    Years since quittin.9   Smokeless Tobacco Never Used

## 2020-12-07 NOTE — TELEPHONE ENCOUNTER
From: Annemarie Gonsales  Sent: 12/7/2020 8:47 AM EST  To: [facility] Clinical Staff  Subject: RE: Appt on 12/10    Yes. Thank you for making the change. Toño Estrada on behalf of [employer]     ----- Message -----  From: Jennifer Butler  Sent: 12/7/20 8:23 AM  To: Amparo Dobbs  Subject: RE: Appt on 12/10    I changed your appt to virtual, here is the consent. Can you please send this back to me with an ok to the consent. Patient was read the following consent and agreed to the virtual visit. We want to confirm that, for purposes of billing, this is a virtual visit with your provider for which we will submit a claim for reimbursement with your insurance company. You will be responsible for any copays, coinsurance amounts or other amounts not covered by your insurance company. If you do not accept this, unfortunately we will not be able to schedule a virtual visit with the provider. Do you accept?        Thanks,   Dr. Whitney Louie office                   ----- Message -----   From:Amparo Dobbs   Sent:12/5/2020 3:03 PM EST   To:Lawanda Caicedo MD   Subject:RE: Appt on 12/10    Hi Dr Lazaro Garg     I spoke with Ange Cr and he would like to do this appointment via Virtual.     Thanks so much for making this offer    Best regards   Toño Estrada       ----- Message -----   Khai Lindo MD   Sent:12/5/2020 12:02 PM EST   To:Amparo Fuller   Subject:Appt on 12/10    I see Ange Cr has an appt coming up on 12/10. Do you want to change this to Virtual? Given the current covid numbers not going to make him come in if not comfortable with it. Let us know and we will change it over.     RK

## 2020-12-10 NOTE — PATIENT INSTRUCTIONS
Patient Education        Diabetes Sick-Day Plan: Care Instructions  Your Care Instructions     If you have diabetes, many other illnesses can make your blood sugar go up. This can be dangerous. When you are sick with the flu or another illness, your body releases hormones to fight infection. These hormones raise blood sugar levels. They also make it hard for insulin or other medicines to lower your blood sugar. Work with your doctor to make a plan for what to do on days when you are sick. Follow-up care is a key part of your treatment and safety. Be sure to make and go to all appointments, and call your doctor if you are having problems. It's also a good idea to know your test results and keep a list of the medicines you take. How can you care for yourself at home? · Work with your doctor to write up a sick-day plan for what to do on days when you are sick. Your blood sugar can go up or down, depending on your illness and whether you can keep food down. Call your doctor when you are sick. Ask if you need to adjust your pills or insulin. · Write down the diabetes medicines you have been taking and whether you have changed the dose based on your sick-day plan. Have this information ready when you call your doctor. · Eat your normal types and amounts of food. Drink extra fluids, such as water, broth, and fruit juice, to prevent dehydration. ? If your blood sugar level is higher than the blood sugar level your doctor recommends (for example, above 240 milligrams per deciliter [mg/dL]), drink extra liquids that do not contain sugar. Examples are water and sugar-free cola. ? If you can't eat your usual foods, drink extra liquids, such as soup, sports drinks, or milk. You may also eat food that is gentle on the stomach. These foods include crackers, gelatin dessert, and applesauce. Try to eat or drink 50 grams of carbohydrates every 3 to 4 hours.  For example, 6 saltine crackers, 1 cup (8 ounces) of milk, and ½ cup (4 ounces) of orange juice each contain about 15 grams of carbohydrate. · Check your blood sugar at least every 3 to 4 hours. If it goes up fast, check it more often. And check it even through the night. Take insulin if your doctor told you to do so. If you and your doctor did not have a sick-day plan for taking extra insulin, call him or her for advice. · If you take insulin, check your urine or blood for ketones. This is even more important if your blood sugar is high. · Do not take any over-the-counter medicines, such as pain relievers, decongestants, or herbal products or other natural medicines, without talking with your doctor first.  · Do not drive. If you need to see your doctor or go anywhere else, ask a family member or friend to drive you. When should you call for help? Call 911 anytime you think you may need emergency care. For example, call if:    · You passed out (lost consciousness).     · You are confused or cannot think clearly.     · Your blood sugar is very high or very low. Watch closely for changes in your health, and be sure to contact your doctor if:    · Your blood sugar stays outside the level your doctor set for you.     · You have any problems. Where can you learn more? Go to https://[employer].com. org and sign in to your [employer] account. Enter D761 in the Drop Messages box to learn more about \"Diabetes Sick-Day Plan: Care Instructions. \"     If you do not have an account, please click on the \"Sign Up Now\" link. Current as of: December 20, 2019               Content Version: 12.6  © 9708-7730 [employer]. Care instructions adapted under license by [facility] ([facility]). If you have questions about a medical condition or this instruction, always ask your healthcare professional. [address] any warranty or liability for your use of this information.

## 2020-12-10 NOTE — PROGRESS NOTES
Patient is being seen Virtually using [facility]. Patient is at home and Dr. Elizabeth Angel is at the office. The patient has consented to having a virtual visit due to the [place] 19 pandemic. Vitals are patient reported. Pursuant to the emergency declaration under the [facility] [address] authority and the [employer] Act, this Virtual  Visit was conducted, with patient's consent, to reduce the patient's risk of exposure to COVID-19 and provide continuity of care for an established patient. Services were provided through a video synchronous discussion virtually to substitute for in-person clinic visit. Patient was instructed that the AVS is available on My Chart or was emailed to the patient if not on My Chart. Lab orders were emailed to patient if they do not use a [facility] lab. Any work notes were sent to patient through My Chart or email. Chief Complaint   Patient presents with    Diabetes     Vit B12 level high        Karoline Self is a 80 y.o. male who presents for follow-up of Type 2 diabetes mellitus. Current medication use: not currently on medications for this problem  Eye exam current (within one year): yes  Current diet: in general, a \"healthy\" diet    Current exercise: minimal  Sees podiatrist: n/a    Checks sugars at home: No  Hypoglycemia symptoms: No    Taking chol med reg, no SE. Anxiety has been well controlled on buspar and lexapro. Wife reports less grumpy and less anxious. Wife reports recently has noticed that right great toe is purplish when takes off compression stockings. Doesn't hurt but does feel cold but feet always do. By the morning looks normal again. No pain with it. The stockings are working great controlling his swelling.      No CP, no palpitations, + chornic but stable sob, occ cough, no change in bowel or bladder,  no nausea, no vomiting, no abdominal pain,    Patient's medications, allergies, past medical, surgical, social and family histories were reviewed and updated in the EHR as appropriate. Wt Readings from Last 3 Encounters:   06/18/20 189 lb (85.7 kg)   02/26/20 188 lb 12.8 oz (85.6 kg)   01/31/20 185 lb 6.4 oz (84.1 kg)     There is no height or weight on file to calculate BMI. PHQ Scores 12/10/2019 11/30/2018 11/30/2017   PHQ2 Score 0 0 0   PHQ9 Score 0 0 0     Patient-Reported Vitals 12/10/2020   Patient-Reported Weight 179 lbs   Patient-Reported Height -   Patient-Reported Systolic 819   Patient-Reported Diastolic 80   Patient-Reported Pulse 69   Patient-Reported Temperature 97.5   Patient-Reported SpO2 93          GEN: Alert and oriented x 4 NAD, affect appropriate and obese, well developed. ASSESSMENT AND PLAN:       Eleuteiro Brown was seen today for diabetes. Diagnoses and all orders for this visit:    Type 2 diabetes mellitus with microalbuminuria, without long-term current use of insulin (HCC)  -     Hemoglobin A1C; Future  -     Basic Metabolic Panel; Future      -A1C good  -continue current meds  -reviewed labs with patient  Lab Results   Component Value Date    LABA1C 7.3 12/04/2020     -lab slip given for next visit  -patient to check sugars as needed  -encouraged a healthy diet, regular exercise and maintaining a healthy weight  -RTO  6 months  -eye form given:  No    Anxiety  -Stable, continue current medications. Essential hypertension  -     CBC; Future  -Stable, continue current medications. Hyperlipidemia  -Stable, continue current medications. Reviewed recent labs with patient. Discoloration of skin of toe  ? Compression causing circulation issues. Increased risk for PVD  rec trying stocking with toes cut out  If still issues stop the stockings and let us know to refer to vascular          Return in about 6 months (around 6/10/2021) for 30 min, Diabetes.

## 2020-12-21 NOTE — TELEPHONE ENCOUNTER
Pt's wife called in stating that Pt is having a lot of difficulty with his breathing, wanted to speak with  directly, informed he is outt-of-office. Sofie Wood requesting a call back from [facility], stated they really need a doctor.      Sofie Wood # [phone]

## 2020-12-21 NOTE — TELEPHONE ENCOUNTER
Called Mrs Janis Hager back and pt is wheezing in his chest and has very thick white phlegm Sx's started 4 days ago. Started the antibiotic he has had on hand today. Usually on 8 liters of O2 but currently on 9 liters and with any type of movement he is dropping in the low 80's Takes 20 mg a day of Prednisone. Mrs Janis Hager statated that the last visit they did with Dr Luna Rizzo he mentioned changing Symbicort to the nebulizer form. ? If this can be done now. Spoke with Dr Luna Rizzo and prescriptions sent for Brovana and Pulmicort and a Pred Taper. However, Mrs Janis Hager cautioned that these changes might be too late and if pt continues to drop in the 80's on 9 liters of O2 then he should go to the ER. She stated she understood and if he continues with these sx's or sx's get worse she will call 911 and get him to ER.

## 2020-12-24 NOTE — TELEPHONE ENCOUNTER
Medication:   Requested Prescriptions     Pending Prescriptions Disp Refills    allopurinol (ZYLOPRIM) 100 MG tablet [Pharmacy Med Name: ALLOPURINOL TABS 100MG] 90 tablet 3     Sig: TAKE 1 TABLET DAILY          Patient Phone Number: [phone] (home) [phone] (work)    Last appt: 12/10/2020   Next appt: Visit date not found    Last OARRS: No flowsheet data found.   PDMP Monitoring:    Last PDMP Isis Mar as Reviewed [facility]):  Review User Review Instant Review Result          Preferred Pharmacy:   [address] [place] [address] 143, [address] [facility] [phone] Albino Pringle [phone] [address] [facility]  Delisa Evans [address]  Phone: [phone] Fax: [phone] - [facility], [address] [place] [address] [phone] -  [phone]  [address] [place] [address] Drive [address]  Phone: [phone] Fax: [phone]

## 2021-01-01 ENCOUNTER — HOSPITAL ENCOUNTER (INPATIENT)
Age: 86
LOS: 10 days | Discharge: HOSPICE | DRG: 291 | End: 2021-04-28
Attending: EMERGENCY MEDICINE | Admitting: INTERNAL MEDICINE
Payer: MEDICARE

## 2021-01-01 ENCOUNTER — APPOINTMENT (OUTPATIENT)
Dept: GENERAL RADIOLOGY | Age: 86
DRG: 291 | End: 2021-01-01
Payer: MEDICARE

## 2021-01-01 ENCOUNTER — OFFICE VISIT (OUTPATIENT)
Dept: FAMILY MEDICINE CLINIC | Age: 86
End: 2021-01-01
Payer: MEDICARE

## 2021-01-01 ENCOUNTER — TELEPHONE (OUTPATIENT)
Dept: PULMONOLOGY | Age: 86
End: 2021-01-01

## 2021-01-01 ENCOUNTER — APPOINTMENT (OUTPATIENT)
Dept: CT IMAGING | Age: 86
DRG: 291 | End: 2021-01-01
Payer: MEDICARE

## 2021-01-01 ENCOUNTER — TELEPHONE (OUTPATIENT)
Dept: FAMILY MEDICINE CLINIC | Age: 86
End: 2021-01-01

## 2021-01-01 ENCOUNTER — PATIENT MESSAGE (OUTPATIENT)
Dept: FAMILY MEDICINE CLINIC | Age: 86
End: 2021-01-01

## 2021-01-01 ENCOUNTER — OFFICE VISIT (OUTPATIENT)
Dept: CARDIOLOGY CLINIC | Age: 86
End: 2021-01-01
Payer: MEDICARE

## 2021-01-01 ENCOUNTER — TELEPHONE (OUTPATIENT)
Dept: CARDIOLOGY CLINIC | Age: 86
End: 2021-01-01

## 2021-01-01 ENCOUNTER — IMMUNIZATION (OUTPATIENT)
Dept: PRIMARY CARE CLINIC | Age: 86
End: 2021-01-01
Payer: MEDICARE

## 2021-01-01 VITALS
HEIGHT: 65 IN | SYSTOLIC BLOOD PRESSURE: 104 MMHG | DIASTOLIC BLOOD PRESSURE: 56 MMHG | TEMPERATURE: 98.5 F | BODY MASS INDEX: 29.09 KG/M2 | OXYGEN SATURATION: 88 % | RESPIRATION RATE: 34 BRPM | WEIGHT: 174.6 LBS | HEART RATE: 87 BPM

## 2021-01-01 VITALS
TEMPERATURE: 97.3 F | BODY MASS INDEX: 31.38 KG/M2 | WEIGHT: 188.6 LBS | SYSTOLIC BLOOD PRESSURE: 90 MMHG | OXYGEN SATURATION: 90 % | HEART RATE: 75 BPM | DIASTOLIC BLOOD PRESSURE: 48 MMHG

## 2021-01-01 VITALS
HEIGHT: 65 IN | DIASTOLIC BLOOD PRESSURE: 60 MMHG | HEART RATE: 90 BPM | BODY MASS INDEX: 31.96 KG/M2 | OXYGEN SATURATION: 98 % | SYSTOLIC BLOOD PRESSURE: 102 MMHG | WEIGHT: 191.8 LBS | RESPIRATION RATE: 23 BRPM

## 2021-01-01 DIAGNOSIS — J44.9 COPD, SEVERE (HCC): ICD-10-CM

## 2021-01-01 DIAGNOSIS — R00.2 PALPITATIONS: ICD-10-CM

## 2021-01-01 DIAGNOSIS — F41.9 ANXIETY: ICD-10-CM

## 2021-01-01 DIAGNOSIS — I27.20 PULMONARY HTN (HCC): ICD-10-CM

## 2021-01-01 DIAGNOSIS — S81.812A SKIN TEAR OF LEFT LOWER LEG WITHOUT COMPLICATION, INITIAL ENCOUNTER: ICD-10-CM

## 2021-01-01 DIAGNOSIS — J40 BRONCHITIS: ICD-10-CM

## 2021-01-01 DIAGNOSIS — I47.29 NSVT (NONSUSTAINED VENTRICULAR TACHYCARDIA): ICD-10-CM

## 2021-01-01 DIAGNOSIS — R53.81 DEBILITY: ICD-10-CM

## 2021-01-01 DIAGNOSIS — R06.02 SOB (SHORTNESS OF BREATH): ICD-10-CM

## 2021-01-01 DIAGNOSIS — I47.1 SVT (SUPRAVENTRICULAR TACHYCARDIA) (HCC): ICD-10-CM

## 2021-01-01 DIAGNOSIS — I50.9 ACUTE ON CHRONIC CONGESTIVE HEART FAILURE, UNSPECIFIED HEART FAILURE TYPE (HCC): ICD-10-CM

## 2021-01-01 DIAGNOSIS — I47.1 SVT (SUPRAVENTRICULAR TACHYCARDIA) (HCC): Primary | ICD-10-CM

## 2021-01-01 DIAGNOSIS — R60.9 PERIPHERAL EDEMA: ICD-10-CM

## 2021-01-01 DIAGNOSIS — J96.91 RESPIRATORY FAILURE WITH HYPOXIA, UNSPECIFIED CHRONICITY (HCC): ICD-10-CM

## 2021-01-01 DIAGNOSIS — I10 ESSENTIAL HYPERTENSION: ICD-10-CM

## 2021-01-01 DIAGNOSIS — J44.1 COPD EXACERBATION (HCC): Primary | ICD-10-CM

## 2021-01-01 DIAGNOSIS — J43.2 CENTRILOBULAR EMPHYSEMA (HCC): ICD-10-CM

## 2021-01-01 DIAGNOSIS — E11.9 TYPE 2 DIABETES MELLITUS WITHOUT COMPLICATION, WITHOUT LONG-TERM CURRENT USE OF INSULIN (HCC): ICD-10-CM

## 2021-01-01 DIAGNOSIS — I95.2 HYPOTENSION DUE TO DRUGS: Primary | ICD-10-CM

## 2021-01-01 DIAGNOSIS — R32 URINARY INCONTINENCE, UNSPECIFIED TYPE: Primary | ICD-10-CM

## 2021-01-01 DIAGNOSIS — J44.9 COPD, SEVERE (HCC): Primary | ICD-10-CM

## 2021-01-01 DIAGNOSIS — I27.20 PULMONARY HYPERTENSION (HCC): ICD-10-CM

## 2021-01-01 DIAGNOSIS — J96.22 ACUTE ON CHRONIC RESPIRATORY FAILURE WITH HYPOXIA AND HYPERCAPNIA (HCC): ICD-10-CM

## 2021-01-01 DIAGNOSIS — J96.21 ACUTE ON CHRONIC RESPIRATORY FAILURE WITH HYPOXIA AND HYPERCAPNIA (HCC): ICD-10-CM

## 2021-01-01 DIAGNOSIS — J84.10 PULMONARY FIBROSIS (HCC): ICD-10-CM

## 2021-01-01 LAB
A/G RATIO: 1.1 (ref 1.1–2.2)
A/G RATIO: 1.2 (ref 1.1–2.2)
ALBUMIN SERPL-MCNC: 3.1 G/DL (ref 3.4–5)
ALBUMIN SERPL-MCNC: 3.1 G/DL (ref 3.4–5)
ALBUMIN SERPL-MCNC: 3.4 G/DL (ref 3.4–5)
ALBUMIN SERPL-MCNC: 3.7 G/DL (ref 3.4–5)
ALP BLD-CCNC: 101 U/L (ref 40–129)
ALP BLD-CCNC: 81 U/L (ref 40–129)
ALP BLD-CCNC: 83 U/L (ref 40–129)
ALP BLD-CCNC: 91 U/L (ref 40–129)
ALT SERPL-CCNC: 12 U/L (ref 10–40)
ALT SERPL-CCNC: 12 U/L (ref 10–40)
ALT SERPL-CCNC: 14 U/L (ref 10–40)
ALT SERPL-CCNC: 9 U/L (ref 10–40)
ANION GAP SERPL CALCULATED.3IONS-SCNC: 10 MMOL/L (ref 3–16)
ANION GAP SERPL CALCULATED.3IONS-SCNC: 5 MMOL/L (ref 3–16)
ANION GAP SERPL CALCULATED.3IONS-SCNC: 5 MMOL/L (ref 3–16)
ANION GAP SERPL CALCULATED.3IONS-SCNC: 6 MMOL/L (ref 3–16)
ANION GAP SERPL CALCULATED.3IONS-SCNC: 7 MMOL/L (ref 3–16)
ANION GAP SERPL CALCULATED.3IONS-SCNC: 7 MMOL/L (ref 3–16)
ANION GAP SERPL CALCULATED.3IONS-SCNC: 8 MMOL/L (ref 3–16)
ANISOCYTOSIS: ABNORMAL
APPEARANCE FLUID: NORMAL
APTT: 23.5 SEC (ref 24.2–36.2)
AST SERPL-CCNC: 10 U/L (ref 15–37)
AST SERPL-CCNC: 13 U/L (ref 15–37)
AST SERPL-CCNC: 15 U/L (ref 15–37)
AST SERPL-CCNC: 17 U/L (ref 15–37)
BASE EXCESS ARTERIAL: 11.8 MMOL/L (ref -3–3)
BASE EXCESS VENOUS: 9.6 MMOL/L (ref -3–3)
BASOPHILS ABSOLUTE: 0 K/UL (ref 0–0.2)
BASOPHILS ABSOLUTE: 0.2 K/UL (ref 0–0.2)
BASOPHILS RELATIVE PERCENT: 0.1 %
BASOPHILS RELATIVE PERCENT: 0.1 %
BASOPHILS RELATIVE PERCENT: 0.4 %
BASOPHILS RELATIVE PERCENT: 1.8 %
BILIRUB SERPL-MCNC: 0.4 MG/DL (ref 0–1)
BILIRUB SERPL-MCNC: 0.5 MG/DL (ref 0–1)
BILIRUB SERPL-MCNC: 0.6 MG/DL (ref 0–1)
BILIRUB SERPL-MCNC: 0.6 MG/DL (ref 0–1)
BILIRUBIN URINE: NEGATIVE
BILIRUBIN, POC: NORMAL
BLOOD CULTURE, ROUTINE: NORMAL
BLOOD URINE, POC: NORMAL
BLOOD, URINE: NEGATIVE
BUN BLDV-MCNC: 107 MG/DL (ref 7–20)
BUN BLDV-MCNC: 43 MG/DL (ref 7–20)
BUN BLDV-MCNC: 44 MG/DL (ref 7–20)
BUN BLDV-MCNC: 47 MG/DL (ref 7–20)
BUN BLDV-MCNC: 64 MG/DL (ref 7–20)
BUN BLDV-MCNC: 72 MG/DL (ref 7–20)
BUN BLDV-MCNC: 91 MG/DL (ref 7–20)
BUN BLDV-MCNC: 92 MG/DL (ref 7–20)
BUN BLDV-MCNC: 95 MG/DL (ref 7–20)
BUN BLDV-MCNC: 99 MG/DL (ref 7–20)
CALCIUM SERPL-MCNC: 8.3 MG/DL (ref 8.3–10.6)
CALCIUM SERPL-MCNC: 8.4 MG/DL (ref 8.3–10.6)
CALCIUM SERPL-MCNC: 8.4 MG/DL (ref 8.3–10.6)
CALCIUM SERPL-MCNC: 8.6 MG/DL (ref 8.3–10.6)
CALCIUM SERPL-MCNC: 8.6 MG/DL (ref 8.3–10.6)
CALCIUM SERPL-MCNC: 8.7 MG/DL (ref 8.3–10.6)
CALCIUM SERPL-MCNC: 8.8 MG/DL (ref 8.3–10.6)
CALCIUM SERPL-MCNC: 9.5 MG/DL (ref 8.3–10.6)
CARBOXYHEMOGLOBIN ARTERIAL: 1.3 % (ref 0–1.5)
CARBOXYHEMOGLOBIN: 3.2 % (ref 0–1.5)
CHLORIDE BLD-SCNC: 100 MMOL/L (ref 99–110)
CHLORIDE BLD-SCNC: 103 MMOL/L (ref 99–110)
CHLORIDE BLD-SCNC: 104 MMOL/L (ref 99–110)
CHLORIDE BLD-SCNC: 97 MMOL/L (ref 99–110)
CHLORIDE BLD-SCNC: 98 MMOL/L (ref 99–110)
CHLORIDE BLD-SCNC: 99 MMOL/L (ref 99–110)
CHOLESTEROL, TOTAL: 118 MG/DL (ref 0–199)
CLARITY, POC: CLEAR
CLARITY: CLEAR
CO2: 34 MMOL/L (ref 21–32)
CO2: 37 MMOL/L (ref 21–32)
CO2: 37 MMOL/L (ref 21–32)
CO2: 38 MMOL/L (ref 21–32)
CO2: 39 MMOL/L (ref 21–32)
CO2: 40 MMOL/L (ref 21–32)
CO2: 40 MMOL/L (ref 21–32)
CO2: 42 MMOL/L (ref 21–32)
COLOR, POC: YELLOW
COLOR: YELLOW
CREAT SERPL-MCNC: 1.1 MG/DL (ref 0.8–1.3)
CREAT SERPL-MCNC: 1.1 MG/DL (ref 0.8–1.3)
CREAT SERPL-MCNC: 1.2 MG/DL (ref 0.8–1.3)
CREAT SERPL-MCNC: 1.3 MG/DL (ref 0.8–1.3)
CREAT SERPL-MCNC: 1.3 MG/DL (ref 0.8–1.3)
CREAT SERPL-MCNC: 1.4 MG/DL (ref 0.8–1.3)
CREAT SERPL-MCNC: 1.5 MG/DL (ref 0.8–1.3)
CREAT SERPL-MCNC: 1.5 MG/DL (ref 0.8–1.3)
CULTURE, BLOOD 2: NORMAL
CULTURE, RESPIRATORY: ABNORMAL
CULTURE, RESPIRATORY: ABNORMAL
EKG ATRIAL RATE: 340 BPM
EKG ATRIAL RATE: 92 BPM
EKG DIAGNOSIS: NORMAL
EKG DIAGNOSIS: NORMAL
EKG Q-T INTERVAL: 354 MS
EKG Q-T INTERVAL: 354 MS
EKG QRS DURATION: 100 MS
EKG QRS DURATION: 86 MS
EKG QTC CALCULATION (BAZETT): 403 MS
EKG QTC CALCULATION (BAZETT): 447 MS
EKG R AXIS: 72 DEGREES
EKG R AXIS: 82 DEGREES
EKG T AXIS: 135 DEGREES
EKG T AXIS: 211 DEGREES
EKG VENTRICULAR RATE: 78 BPM
EKG VENTRICULAR RATE: 96 BPM
EOSINOPHILS ABSOLUTE: 0 K/UL (ref 0–0.6)
EOSINOPHILS RELATIVE PERCENT: 0 %
EPITHELIAL CELLS, UA: 1 /HPF (ref 0–5)
ESTIMATED AVERAGE GLUCOSE: 177.2 MG/DL
FOLATE: 12.8 NG/ML (ref 4.78–24.2)
GFR AFRICAN AMERICAN: 53
GFR AFRICAN AMERICAN: 53
GFR AFRICAN AMERICAN: 58
GFR AFRICAN AMERICAN: >60
GFR NON-AFRICAN AMERICAN: 44
GFR NON-AFRICAN AMERICAN: 44
GFR NON-AFRICAN AMERICAN: 48
GFR NON-AFRICAN AMERICAN: 52
GFR NON-AFRICAN AMERICAN: 52
GFR NON-AFRICAN AMERICAN: 57
GFR NON-AFRICAN AMERICAN: >60
GFR NON-AFRICAN AMERICAN: >60
GLOBULIN: 2.8 G/DL
GLOBULIN: 2.8 G/DL
GLOBULIN: 2.9 G/DL
GLOBULIN: 3.3 G/DL
GLUCOSE BLD-MCNC: 104 MG/DL (ref 70–99)
GLUCOSE BLD-MCNC: 107 MG/DL (ref 70–99)
GLUCOSE BLD-MCNC: 113 MG/DL (ref 70–99)
GLUCOSE BLD-MCNC: 113 MG/DL (ref 70–99)
GLUCOSE BLD-MCNC: 146 MG/DL (ref 70–99)
GLUCOSE BLD-MCNC: 155 MG/DL (ref 70–99)
GLUCOSE BLD-MCNC: 162 MG/DL (ref 70–99)
GLUCOSE BLD-MCNC: 164 MG/DL (ref 70–99)
GLUCOSE BLD-MCNC: 165 MG/DL (ref 70–99)
GLUCOSE BLD-MCNC: 169 MG/DL (ref 70–99)
GLUCOSE BLD-MCNC: 171 MG/DL (ref 70–99)
GLUCOSE BLD-MCNC: 174 MG/DL (ref 70–99)
GLUCOSE BLD-MCNC: 177 MG/DL (ref 70–99)
GLUCOSE BLD-MCNC: 179 MG/DL (ref 70–99)
GLUCOSE BLD-MCNC: 180 MG/DL (ref 70–99)
GLUCOSE BLD-MCNC: 185 MG/DL (ref 70–99)
GLUCOSE BLD-MCNC: 187 MG/DL (ref 70–99)
GLUCOSE BLD-MCNC: 189 MG/DL (ref 70–99)
GLUCOSE BLD-MCNC: 192 MG/DL (ref 70–99)
GLUCOSE BLD-MCNC: 195 MG/DL (ref 70–99)
GLUCOSE BLD-MCNC: 196 MG/DL (ref 70–99)
GLUCOSE BLD-MCNC: 199 MG/DL (ref 70–99)
GLUCOSE BLD-MCNC: 200 MG/DL (ref 70–99)
GLUCOSE BLD-MCNC: 201 MG/DL (ref 70–99)
GLUCOSE BLD-MCNC: 202 MG/DL (ref 70–99)
GLUCOSE BLD-MCNC: 202 MG/DL (ref 70–99)
GLUCOSE BLD-MCNC: 203 MG/DL (ref 70–99)
GLUCOSE BLD-MCNC: 204 MG/DL (ref 70–99)
GLUCOSE BLD-MCNC: 220 MG/DL (ref 70–99)
GLUCOSE BLD-MCNC: 229 MG/DL (ref 70–99)
GLUCOSE BLD-MCNC: 230 MG/DL (ref 70–99)
GLUCOSE BLD-MCNC: 231 MG/DL (ref 70–99)
GLUCOSE BLD-MCNC: 233 MG/DL (ref 70–99)
GLUCOSE BLD-MCNC: 234 MG/DL (ref 70–99)
GLUCOSE BLD-MCNC: 238 MG/DL (ref 70–99)
GLUCOSE BLD-MCNC: 244 MG/DL (ref 70–99)
GLUCOSE BLD-MCNC: 245 MG/DL (ref 70–99)
GLUCOSE BLD-MCNC: 262 MG/DL (ref 70–99)
GLUCOSE BLD-MCNC: 275 MG/DL (ref 70–99)
GLUCOSE BLD-MCNC: 280 MG/DL (ref 70–99)
GLUCOSE BLD-MCNC: 286 MG/DL (ref 70–99)
GLUCOSE BLD-MCNC: 295 MG/DL (ref 70–99)
GLUCOSE BLD-MCNC: 315 MG/DL (ref 70–99)
GLUCOSE BLD-MCNC: 317 MG/DL (ref 70–99)
GLUCOSE BLD-MCNC: 326 MG/DL (ref 70–99)
GLUCOSE BLD-MCNC: 326 MG/DL (ref 70–99)
GLUCOSE BLD-MCNC: 327 MG/DL (ref 70–99)
GLUCOSE BLD-MCNC: 357 MG/DL (ref 70–99)
GLUCOSE BLD-MCNC: 371 MG/DL (ref 70–99)
GLUCOSE BLD-MCNC: 378 MG/DL (ref 70–99)
GLUCOSE URINE, POC: 100
GLUCOSE URINE: NEGATIVE MG/DL
GRAM STAIN RESULT: ABNORMAL
HBA1C MFR BLD: 7.8 %
HCO3 ARTERIAL: 40.5 MMOL/L (ref 21–29)
HCO3 VENOUS: 39.1 MMOL/L (ref 23–29)
HCT VFR BLD CALC: 36.9 % (ref 40.5–52.5)
HCT VFR BLD CALC: 37.1 % (ref 40.5–52.5)
HCT VFR BLD CALC: 38.9 % (ref 40.5–52.5)
HCT VFR BLD CALC: 42.4 % (ref 40.5–52.5)
HDLC SERPL-MCNC: 47 MG/DL (ref 40–60)
HEMOGLOBIN, ART, EXTENDED: 12.1 G/DL (ref 13.5–17.5)
HEMOGLOBIN, VEN, REDUCED: 3 %
HEMOGLOBIN: 11.4 G/DL (ref 13.5–17.5)
HEMOGLOBIN: 11.6 G/DL (ref 13.5–17.5)
HEMOGLOBIN: 12.1 G/DL (ref 13.5–17.5)
HEMOGLOBIN: 12.8 G/DL (ref 13.5–17.5)
HYALINE CASTS: 9 /LPF (ref 0–8)
INR BLD: 1.11 (ref 0.86–1.14)
IRON SATURATION: 9 % (ref 20–50)
IRON: 28 UG/DL (ref 59–158)
KETONES, POC: NORMAL
KETONES, URINE: NEGATIVE MG/DL
LACTIC ACID, SEPSIS: 1.2 MMOL/L (ref 0.4–1.9)
LACTIC ACID, SEPSIS: 1.7 MMOL/L (ref 0.4–1.9)
LDL CHOLESTEROL CALCULATED: 56 MG/DL
LEUKOCYTE EST, POC: NORMAL
LEUKOCYTE ESTERASE, URINE: NEGATIVE
LV EF: 58 %
LVEF MODALITY: NORMAL
LYMPHOCYTES ABSOLUTE: 0.1 K/UL (ref 1–5.1)
LYMPHOCYTES ABSOLUTE: 0.2 K/UL (ref 1–5.1)
LYMPHOCYTES ABSOLUTE: 0.2 K/UL (ref 1–5.1)
LYMPHOCYTES ABSOLUTE: 0.3 K/UL (ref 1–5.1)
LYMPHOCYTES RELATIVE PERCENT: 1.3 %
LYMPHOCYTES RELATIVE PERCENT: 1.6 %
LYMPHOCYTES RELATIVE PERCENT: 1.6 %
LYMPHOCYTES RELATIVE PERCENT: 2.6 %
MACROCYTES: ABNORMAL
MAGNESIUM: 1.8 MG/DL (ref 1.8–2.4)
MAGNESIUM: 1.9 MG/DL (ref 1.8–2.4)
MAGNESIUM: 2.5 MG/DL (ref 1.8–2.4)
MAGNESIUM: 2.7 MG/DL (ref 1.8–2.4)
MCH RBC QN AUTO: 26.2 PG (ref 26–34)
MCH RBC QN AUTO: 26.4 PG (ref 26–34)
MCH RBC QN AUTO: 26.6 PG (ref 26–34)
MCH RBC QN AUTO: 26.7 PG (ref 26–34)
MCHC RBC AUTO-ENTMCNC: 30.2 G/DL (ref 31–36)
MCHC RBC AUTO-ENTMCNC: 30.7 G/DL (ref 31–36)
MCHC RBC AUTO-ENTMCNC: 31.1 G/DL (ref 31–36)
MCHC RBC AUTO-ENTMCNC: 31.3 G/DL (ref 31–36)
MCV RBC AUTO: 84.9 FL (ref 80–100)
MCV RBC AUTO: 85.5 FL (ref 80–100)
MCV RBC AUTO: 85.9 FL (ref 80–100)
MCV RBC AUTO: 87.4 FL (ref 80–100)
METHEMOGLOBIN ARTERIAL: 0.5 %
METHEMOGLOBIN VENOUS: 0.1 %
MICROCYTES: ABNORMAL
MICROSCOPIC EXAMINATION: YES
MONOCYTES ABSOLUTE: 0.1 K/UL (ref 0–1.3)
MONOCYTES ABSOLUTE: 0.3 K/UL (ref 0–1.3)
MONOCYTES ABSOLUTE: 0.4 K/UL (ref 0–1.3)
MONOCYTES ABSOLUTE: 0.5 K/UL (ref 0–1.3)
MONOCYTES RELATIVE PERCENT: 1.1 %
MONOCYTES RELATIVE PERCENT: 3 %
MONOCYTES RELATIVE PERCENT: 3.8 %
MONOCYTES RELATIVE PERCENT: 4.1 %
NEUTROPHILS ABSOLUTE: 10.9 K/UL (ref 1.7–7.7)
NEUTROPHILS ABSOLUTE: 12 K/UL (ref 1.7–7.7)
NEUTROPHILS ABSOLUTE: 9.2 K/UL (ref 1.7–7.7)
NEUTROPHILS ABSOLUTE: 9.6 K/UL (ref 1.7–7.7)
NEUTROPHILS RELATIVE PERCENT: 93.2 %
NEUTROPHILS RELATIVE PERCENT: 94.2 %
NEUTROPHILS RELATIVE PERCENT: 95.5 %
NEUTROPHILS RELATIVE PERCENT: 95.6 %
NITRITE, POC: NORMAL
NITRITE, URINE: NEGATIVE
O2 CONTENT ARTERIAL: 17 ML/DL
O2 CONTENT, VEN: 17 VOL %
O2 SAT, ARTERIAL: 99.8 %
O2 SAT, VEN: 97 %
O2 THERAPY: ABNORMAL
O2 THERAPY: ABNORMAL
ORGANISM: ABNORMAL
OVALOCYTES: ABNORMAL
PCO2 ARTERIAL: 74.6 MMHG (ref 35–45)
PCO2, VEN: 80.4 MMHG (ref 40–50)
PDW BLD-RTO: 18.1 % (ref 12.4–15.4)
PDW BLD-RTO: 18.1 % (ref 12.4–15.4)
PDW BLD-RTO: 18.5 % (ref 12.4–15.4)
PDW BLD-RTO: 18.5 % (ref 12.4–15.4)
PERFORMED ON: ABNORMAL
PH ARTERIAL: 7.34 (ref 7.35–7.45)
PH UA: 5 (ref 5–8)
PH VENOUS: 7.29 (ref 7.35–7.45)
PH, POC: 5.5
PHOSPHORUS: 3.4 MG/DL (ref 2.5–4.9)
PHOSPHORUS: 3.8 MG/DL (ref 2.5–4.9)
PHOSPHORUS: 4.6 MG/DL (ref 2.5–4.9)
PHOSPHORUS: 4.7 MG/DL (ref 2.5–4.9)
PLATELET # BLD: 111 K/UL (ref 135–450)
PLATELET # BLD: 119 K/UL (ref 135–450)
PLATELET # BLD: 122 K/UL (ref 135–450)
PLATELET # BLD: 146 K/UL (ref 135–450)
PLATELET SLIDE REVIEW: ADEQUATE
PMV BLD AUTO: 10.6 FL (ref 5–10.5)
PMV BLD AUTO: 10.6 FL (ref 5–10.5)
PMV BLD AUTO: 11 FL (ref 5–10.5)
PMV BLD AUTO: 11.2 FL (ref 5–10.5)
PO2 ARTERIAL: 176 MMHG (ref 75–108)
PO2, VEN: 99.6 MMHG (ref 25–40)
POLYCHROMASIA: ABNORMAL
POTASSIUM REFLEX MAGNESIUM: 4.2 MMOL/L (ref 3.5–5.1)
POTASSIUM REFLEX MAGNESIUM: 4.2 MMOL/L (ref 3.5–5.1)
POTASSIUM REFLEX MAGNESIUM: 4.6 MMOL/L (ref 3.5–5.1)
POTASSIUM REFLEX MAGNESIUM: 4.7 MMOL/L (ref 3.5–5.1)
POTASSIUM REFLEX MAGNESIUM: 4.8 MMOL/L (ref 3.5–5.1)
POTASSIUM SERPL-SCNC: 3.9 MMOL/L (ref 3.5–5.1)
POTASSIUM SERPL-SCNC: 3.9 MMOL/L (ref 3.5–5.1)
POTASSIUM SERPL-SCNC: 4 MMOL/L (ref 3.5–5.1)
POTASSIUM SERPL-SCNC: 4.7 MMOL/L (ref 3.5–5.1)
POTASSIUM SERPL-SCNC: 5.3 MMOL/L (ref 3.5–5.1)
PRO-BNP: 1557 PG/ML (ref 0–449)
PRO-BNP: 1665 PG/ML (ref 0–449)
PRO-BNP: 2089 PG/ML (ref 0–449)
PRO-BNP: 2322 PG/ML (ref 0–449)
PRO-BNP: 2458 PG/ML (ref 0–449)
PROCALCITONIN: 0.1 NG/ML (ref 0–0.15)
PROCALCITONIN: 0.12 NG/ML (ref 0–0.15)
PROTEIN UA: ABNORMAL MG/DL
PROTEIN, POC: NORMAL
PROTHROMBIN TIME: 12.9 SEC (ref 10–13.2)
RBC # BLD: 4.34 M/UL (ref 4.2–5.9)
RBC # BLD: 4.34 M/UL (ref 4.2–5.9)
RBC # BLD: 4.53 M/UL (ref 4.2–5.9)
RBC # BLD: 4.86 M/UL (ref 4.2–5.9)
RBC UA: 1 /HPF (ref 0–4)
REASON FOR REJECTION: NORMAL
REJECTED TEST: NORMAL
SARS-COV-2, NAAT: NOT DETECTED
SLIDE REVIEW: ABNORMAL
SODIUM BLD-SCNC: 140 MMOL/L (ref 136–145)
SODIUM BLD-SCNC: 143 MMOL/L (ref 136–145)
SODIUM BLD-SCNC: 143 MMOL/L (ref 136–145)
SODIUM BLD-SCNC: 144 MMOL/L (ref 136–145)
SODIUM BLD-SCNC: 145 MMOL/L (ref 136–145)
SODIUM BLD-SCNC: 145 MMOL/L (ref 136–145)
SODIUM BLD-SCNC: 146 MMOL/L (ref 136–145)
SODIUM BLD-SCNC: 147 MMOL/L (ref 136–145)
SODIUM BLD-SCNC: 147 MMOL/L (ref 136–145)
SODIUM BLD-SCNC: 148 MMOL/L (ref 136–145)
SPECIFIC GRAVITY UA: 1.02 (ref 1–1.03)
SPECIFIC GRAVITY, POC: 1.03
TCO2 ARTERIAL: 95.8 MMOL/L
TCO2 CALC VENOUS: 93 MMOL/L
TOTAL IRON BINDING CAPACITY: 301 UG/DL (ref 260–445)
TOTAL PROTEIN: 5.9 G/DL (ref 6.4–8.2)
TOTAL PROTEIN: 5.9 G/DL (ref 6.4–8.2)
TOTAL PROTEIN: 6.3 G/DL (ref 6.4–8.2)
TOTAL PROTEIN: 7 G/DL (ref 6.4–8.2)
TRIGL SERPL-MCNC: 77 MG/DL (ref 0–150)
TROPONIN: 0.02 NG/ML
TROPONIN: <0.01 NG/ML
TROPONIN: <0.01 NG/ML
TSH REFLEX: 1.63 UIU/ML (ref 0.27–4.2)
URINE REFLEX TO CULTURE: ABNORMAL
URINE TYPE: ABNORMAL
UROBILINOGEN, POC: 1
UROBILINOGEN, URINE: 0.2 E.U./DL
VITAMIN B-12: >2000 PG/ML (ref 211–911)
VLDLC SERPL CALC-MCNC: 15 MG/DL
WBC # BLD: 10.1 K/UL (ref 4–11)
WBC # BLD: 11.5 K/UL (ref 4–11)
WBC # BLD: 12.8 K/UL (ref 4–11)
WBC # BLD: 9.7 K/UL (ref 4–11)
WBC UA: 1 /HPF (ref 0–5)

## 2021-01-01 PROCEDURE — 71045 X-RAY EXAM CHEST 1 VIEW: CPT

## 2021-01-01 PROCEDURE — 6360000002 HC RX W HCPCS: Performed by: INTERNAL MEDICINE

## 2021-01-01 PROCEDURE — 99233 SBSQ HOSP IP/OBS HIGH 50: CPT | Performed by: INTERNAL MEDICINE

## 2021-01-01 PROCEDURE — 94640 AIRWAY INHALATION TREATMENT: CPT

## 2021-01-01 PROCEDURE — 2580000003 HC RX 258: Performed by: INTERNAL MEDICINE

## 2021-01-01 PROCEDURE — 6370000000 HC RX 637 (ALT 250 FOR IP): Performed by: INTERNAL MEDICINE

## 2021-01-01 PROCEDURE — 94761 N-INVAS EAR/PLS OXIMETRY MLT: CPT

## 2021-01-01 PROCEDURE — 2700000000 HC OXYGEN THERAPY PER DAY

## 2021-01-01 PROCEDURE — 85025 COMPLETE CBC W/AUTO DIFF WBC: CPT

## 2021-01-01 PROCEDURE — 71250 CT THORAX DX C-: CPT

## 2021-01-01 PROCEDURE — 0002A COVID-19, PFIZER VACCINE 30MCG/0.3ML DOSE: CPT | Performed by: FAMILY MEDICINE

## 2021-01-01 PROCEDURE — 92610 EVALUATE SWALLOWING FUNCTION: CPT

## 2021-01-01 PROCEDURE — 87040 BLOOD CULTURE FOR BACTERIA: CPT

## 2021-01-01 PROCEDURE — 3023F SPIROM DOC REV: CPT | Performed by: FAMILY MEDICINE

## 2021-01-01 PROCEDURE — 99214 OFFICE O/P EST MOD 30 MIN: CPT | Performed by: FAMILY MEDICINE

## 2021-01-01 PROCEDURE — 1036F TOBACCO NON-USER: CPT | Performed by: INTERNAL MEDICINE

## 2021-01-01 PROCEDURE — 80048 BASIC METABOLIC PNL TOTAL CA: CPT

## 2021-01-01 PROCEDURE — 2000000000 HC ICU R&B

## 2021-01-01 PROCEDURE — 84145 PROCALCITONIN (PCT): CPT

## 2021-01-01 PROCEDURE — 36415 COLL VENOUS BLD VENIPUNCTURE: CPT

## 2021-01-01 PROCEDURE — G8484 FLU IMMUNIZE NO ADMIN: HCPCS | Performed by: INTERNAL MEDICINE

## 2021-01-01 PROCEDURE — 82803 BLOOD GASES ANY COMBINATION: CPT

## 2021-01-01 PROCEDURE — G8427 DOCREV CUR MEDS BY ELIG CLIN: HCPCS | Performed by: INTERNAL MEDICINE

## 2021-01-01 PROCEDURE — 93005 ELECTROCARDIOGRAM TRACING: CPT | Performed by: EMERGENCY MEDICINE

## 2021-01-01 PROCEDURE — 94669 MECHANICAL CHEST WALL OSCILL: CPT

## 2021-01-01 PROCEDURE — 99291 CRITICAL CARE FIRST HOUR: CPT | Performed by: INTERNAL MEDICINE

## 2021-01-01 PROCEDURE — 6360000002 HC RX W HCPCS: Performed by: PHYSICIAN ASSISTANT

## 2021-01-01 PROCEDURE — 87186 SC STD MICRODIL/AGAR DIL: CPT

## 2021-01-01 PROCEDURE — 83540 ASSAY OF IRON: CPT

## 2021-01-01 PROCEDURE — 83735 ASSAY OF MAGNESIUM: CPT

## 2021-01-01 PROCEDURE — 99284 EMERGENCY DEPT VISIT MOD MDM: CPT

## 2021-01-01 PROCEDURE — 91300 COVID-19, PFIZER VACCINE 30MCG/0.3ML DOSE: CPT | Performed by: FAMILY MEDICINE

## 2021-01-01 PROCEDURE — 97166 OT EVAL MOD COMPLEX 45 MIN: CPT

## 2021-01-01 PROCEDURE — 99222 1ST HOSP IP/OBS MODERATE 55: CPT | Performed by: INTERNAL MEDICINE

## 2021-01-01 PROCEDURE — 83036 HEMOGLOBIN GLYCOSYLATED A1C: CPT

## 2021-01-01 PROCEDURE — 93010 ELECTROCARDIOGRAM REPORT: CPT | Performed by: INTERNAL MEDICINE

## 2021-01-01 PROCEDURE — G8926 SPIRO NO PERF OR DOC: HCPCS | Performed by: FAMILY MEDICINE

## 2021-01-01 PROCEDURE — 94660 CPAP INITIATION&MGMT: CPT

## 2021-01-01 PROCEDURE — 92526 ORAL FUNCTION THERAPY: CPT

## 2021-01-01 PROCEDURE — 81001 URINALYSIS AUTO W/SCOPE: CPT

## 2021-01-01 PROCEDURE — 84484 ASSAY OF TROPONIN QUANT: CPT

## 2021-01-01 PROCEDURE — 2100000000 HC CCU R&B

## 2021-01-01 PROCEDURE — G8427 DOCREV CUR MEDS BY ELIG CLIN: HCPCS | Performed by: FAMILY MEDICINE

## 2021-01-01 PROCEDURE — 6360000002 HC RX W HCPCS

## 2021-01-01 PROCEDURE — 82607 VITAMIN B-12: CPT

## 2021-01-01 PROCEDURE — 4040F PNEUMOC VAC/ADMIN/RCVD: CPT | Performed by: FAMILY MEDICINE

## 2021-01-01 PROCEDURE — 85730 THROMBOPLASTIN TIME PARTIAL: CPT

## 2021-01-01 PROCEDURE — 97110 THERAPEUTIC EXERCISES: CPT

## 2021-01-01 PROCEDURE — 83880 ASSAY OF NATRIURETIC PEPTIDE: CPT

## 2021-01-01 PROCEDURE — 99223 1ST HOSP IP/OBS HIGH 75: CPT | Performed by: INTERNAL MEDICINE

## 2021-01-01 PROCEDURE — 99232 SBSQ HOSP IP/OBS MODERATE 35: CPT | Performed by: INTERNAL MEDICINE

## 2021-01-01 PROCEDURE — 84100 ASSAY OF PHOSPHORUS: CPT

## 2021-01-01 PROCEDURE — 85610 PROTHROMBIN TIME: CPT

## 2021-01-01 PROCEDURE — 82746 ASSAY OF FOLIC ACID SERUM: CPT

## 2021-01-01 PROCEDURE — G8417 CALC BMI ABV UP PARAM F/U: HCPCS | Performed by: FAMILY MEDICINE

## 2021-01-01 PROCEDURE — 87635 SARS-COV-2 COVID-19 AMP PRB: CPT

## 2021-01-01 PROCEDURE — 87070 CULTURE OTHR SPECIMN AEROBIC: CPT

## 2021-01-01 PROCEDURE — 97530 THERAPEUTIC ACTIVITIES: CPT

## 2021-01-01 PROCEDURE — 6360000002 HC RX W HCPCS: Performed by: STUDENT IN AN ORGANIZED HEALTH CARE EDUCATION/TRAINING PROGRAM

## 2021-01-01 PROCEDURE — 84443 ASSAY THYROID STIM HORMONE: CPT

## 2021-01-01 PROCEDURE — 81002 URINALYSIS NONAUTO W/O SCOPE: CPT | Performed by: FAMILY MEDICINE

## 2021-01-01 PROCEDURE — 87205 SMEAR GRAM STAIN: CPT

## 2021-01-01 PROCEDURE — G8417 CALC BMI ABV UP PARAM F/U: HCPCS | Performed by: INTERNAL MEDICINE

## 2021-01-01 PROCEDURE — 0001A COVID-19, PFIZER VACCINE 30MCG/0.3ML DOSE: CPT | Performed by: FAMILY MEDICINE

## 2021-01-01 PROCEDURE — 80053 COMPREHEN METABOLIC PANEL: CPT

## 2021-01-01 PROCEDURE — 80061 LIPID PANEL: CPT

## 2021-01-01 PROCEDURE — 83605 ASSAY OF LACTIC ACID: CPT

## 2021-01-01 PROCEDURE — 97535 SELF CARE MNGMENT TRAINING: CPT

## 2021-01-01 PROCEDURE — 1123F ACP DISCUSS/DSCN MKR DOCD: CPT | Performed by: FAMILY MEDICINE

## 2021-01-01 PROCEDURE — G8428 CUR MEDS NOT DOCUMENT: HCPCS | Performed by: FAMILY MEDICINE

## 2021-01-01 PROCEDURE — 83550 IRON BINDING TEST: CPT

## 2021-01-01 PROCEDURE — 99214 OFFICE O/P EST MOD 30 MIN: CPT | Performed by: INTERNAL MEDICINE

## 2021-01-01 PROCEDURE — 4040F PNEUMOC VAC/ADMIN/RCVD: CPT | Performed by: INTERNAL MEDICINE

## 2021-01-01 PROCEDURE — 87077 CULTURE AEROBIC IDENTIFY: CPT

## 2021-01-01 PROCEDURE — 99233 SBSQ HOSP IP/OBS HIGH 50: CPT | Performed by: NURSE PRACTITIONER

## 2021-01-01 PROCEDURE — 99211 OFF/OP EST MAY X REQ PHY/QHP: CPT | Performed by: FAMILY MEDICINE

## 2021-01-01 PROCEDURE — 1036F TOBACCO NON-USER: CPT | Performed by: FAMILY MEDICINE

## 2021-01-01 PROCEDURE — 97162 PT EVAL MOD COMPLEX 30 MIN: CPT

## 2021-01-01 PROCEDURE — 2580000003 HC RX 258: Performed by: STUDENT IN AN ORGANIZED HEALTH CARE EDUCATION/TRAINING PROGRAM

## 2021-01-01 PROCEDURE — 93306 TTE W/DOPPLER COMPLETE: CPT

## 2021-01-01 PROCEDURE — 96374 THER/PROPH/DIAG INJ IV PUSH: CPT

## 2021-01-01 PROCEDURE — 6370000000 HC RX 637 (ALT 250 FOR IP): Performed by: PHYSICIAN ASSISTANT

## 2021-01-01 PROCEDURE — 1123F ACP DISCUSS/DSCN MKR DOCD: CPT | Performed by: INTERNAL MEDICINE

## 2021-01-01 PROCEDURE — 93005 ELECTROCARDIOGRAM TRACING: CPT | Performed by: INTERNAL MEDICINE

## 2021-01-01 RX ORDER — ALLOPURINOL 100 MG/1
100 TABLET ORAL DAILY
Status: DISCONTINUED | OUTPATIENT
Start: 2021-01-01 | End: 2021-01-01 | Stop reason: HOSPADM

## 2021-01-01 RX ORDER — ESCITALOPRAM OXALATE 10 MG/1
5 TABLET ORAL DAILY
Status: DISCONTINUED | OUTPATIENT
Start: 2021-01-01 | End: 2021-01-01 | Stop reason: HOSPADM

## 2021-01-01 RX ORDER — SODIUM CHLORIDE 0.9 % (FLUSH) 0.9 %
10 SYRINGE (ML) INJECTION PRN
Status: DISCONTINUED | OUTPATIENT
Start: 2021-01-01 | End: 2021-01-01 | Stop reason: HOSPADM

## 2021-01-01 RX ORDER — FUROSEMIDE 10 MG/ML
20 INJECTION INTRAMUSCULAR; INTRAVENOUS DAILY
Status: DISCONTINUED | OUTPATIENT
Start: 2021-01-01 | End: 2021-01-01 | Stop reason: HOSPADM

## 2021-01-01 RX ORDER — MORPHINE SULFATE 15 MG/1
15 TABLET, FILM COATED, EXTENDED RELEASE ORAL 2 TIMES DAILY
Qty: 6 EACH | Refills: 0 | Status: SHIPPED | OUTPATIENT
Start: 2021-01-01 | End: 2021-05-01

## 2021-01-01 RX ORDER — SODIUM CHLORIDE 9 MG/ML
25 INJECTION, SOLUTION INTRAVENOUS PRN
Status: DISCONTINUED | OUTPATIENT
Start: 2021-01-01 | End: 2021-01-01 | Stop reason: HOSPADM

## 2021-01-01 RX ORDER — POLYETHYLENE GLYCOL 3350 17 G/17G
17 POWDER, FOR SOLUTION ORAL EVERY OTHER DAY
Qty: 527 G | Refills: 1 | Status: SHIPPED | OUTPATIENT
Start: 2021-01-01 | End: 2021-05-28

## 2021-01-01 RX ORDER — DILTIAZEM HYDROCHLORIDE 120 MG/1
120 CAPSULE, COATED, EXTENDED RELEASE ORAL DAILY
Status: DISCONTINUED | OUTPATIENT
Start: 2021-01-01 | End: 2021-01-01 | Stop reason: HOSPADM

## 2021-01-01 RX ORDER — ATORVASTATIN CALCIUM 20 MG/1
20 TABLET, FILM COATED ORAL NIGHTLY
Status: DISCONTINUED | OUTPATIENT
Start: 2021-01-01 | End: 2021-01-01 | Stop reason: HOSPADM

## 2021-01-01 RX ORDER — IPRATROPIUM BROMIDE AND ALBUTEROL SULFATE 2.5; .5 MG/3ML; MG/3ML
3 SOLUTION RESPIRATORY (INHALATION)
Qty: 360 ML | Refills: 3 | Status: SHIPPED | OUTPATIENT
Start: 2021-01-01

## 2021-01-01 RX ORDER — LEVOFLOXACIN 5 MG/ML
500 INJECTION, SOLUTION INTRAVENOUS ONCE
Status: DISCONTINUED | OUTPATIENT
Start: 2021-01-01 | End: 2021-01-01

## 2021-01-01 RX ORDER — PROMETHAZINE HYDROCHLORIDE 25 MG/1
12.5 TABLET ORAL EVERY 6 HOURS PRN
Status: DISCONTINUED | OUTPATIENT
Start: 2021-01-01 | End: 2021-01-01 | Stop reason: HOSPADM

## 2021-01-01 RX ORDER — DOXYCYCLINE HYCLATE 100 MG
100 TABLET ORAL DAILY
Qty: 10 TABLET | Refills: 0 | Status: SHIPPED | OUTPATIENT
Start: 2021-01-01 | End: 2021-01-01

## 2021-01-01 RX ORDER — DEXTROSE MONOHYDRATE 50 MG/ML
100 INJECTION, SOLUTION INTRAVENOUS PRN
Status: DISCONTINUED | OUTPATIENT
Start: 2021-01-01 | End: 2021-01-01 | Stop reason: HOSPADM

## 2021-01-01 RX ORDER — POTASSIUM CHLORIDE 20 MEQ/1
40 TABLET, EXTENDED RELEASE ORAL PRN
Status: DISCONTINUED | OUTPATIENT
Start: 2021-01-01 | End: 2021-01-01 | Stop reason: HOSPADM

## 2021-01-01 RX ORDER — BUSPIRONE HYDROCHLORIDE 5 MG/1
5 TABLET ORAL 3 TIMES DAILY
Status: DISCONTINUED | OUTPATIENT
Start: 2021-01-01 | End: 2021-01-01 | Stop reason: HOSPADM

## 2021-01-01 RX ORDER — FUROSEMIDE 10 MG/ML
40 INJECTION INTRAMUSCULAR; INTRAVENOUS ONCE
Status: CANCELLED | OUTPATIENT
Start: 2021-01-01 | End: 2021-01-01

## 2021-01-01 RX ORDER — ALBUTEROL SULFATE 90 UG/1
2 AEROSOL, METERED RESPIRATORY (INHALATION) DAILY PRN
Status: DISCONTINUED | OUTPATIENT
Start: 2021-01-01 | End: 2021-01-01 | Stop reason: HOSPADM

## 2021-01-01 RX ORDER — BUDESONIDE 0.5 MG/2ML
500 INHALANT ORAL 2 TIMES DAILY
Status: DISCONTINUED | OUTPATIENT
Start: 2021-01-01 | End: 2021-01-01 | Stop reason: HOSPADM

## 2021-01-01 RX ORDER — FUROSEMIDE 20 MG/1
20 TABLET ORAL DAILY
Qty: 30 TABLET | Refills: 1 | Status: SHIPPED | OUTPATIENT
Start: 2021-01-01

## 2021-01-01 RX ORDER — PREDNISONE 20 MG/1
40 TABLET ORAL DAILY
Status: DISCONTINUED | OUTPATIENT
Start: 2021-01-01 | End: 2021-01-01

## 2021-01-01 RX ORDER — BLOOD SUGAR DIAGNOSTIC
STRIP MISCELLANEOUS
Qty: 50 STRIP | Refills: 11 | Status: SHIPPED | OUTPATIENT
Start: 2021-01-01

## 2021-01-01 RX ORDER — INSULIN LISPRO 100 [IU]/ML
0-12 INJECTION, SOLUTION INTRAVENOUS; SUBCUTANEOUS
Status: DISCONTINUED | OUTPATIENT
Start: 2021-01-01 | End: 2021-01-01 | Stop reason: HOSPADM

## 2021-01-01 RX ORDER — BUDESONIDE 0.5 MG/2ML
INHALANT ORAL
Status: COMPLETED
Start: 2021-01-01 | End: 2021-01-01

## 2021-01-01 RX ORDER — ASCORBIC ACID 500 MG
500 TABLET ORAL DAILY
Status: DISCONTINUED | OUTPATIENT
Start: 2021-01-01 | End: 2021-01-01

## 2021-01-01 RX ORDER — CALCIUM CARBONATE 500(1250)
1 TABLET ORAL DAILY
Status: DISCONTINUED | OUTPATIENT
Start: 2021-01-01 | End: 2021-01-01

## 2021-01-01 RX ORDER — SODIUM CHLORIDE 0.9 % (FLUSH) 0.9 %
5-40 SYRINGE (ML) INJECTION EVERY 12 HOURS SCHEDULED
Status: DISCONTINUED | OUTPATIENT
Start: 2021-01-01 | End: 2021-01-01 | Stop reason: HOSPADM

## 2021-01-01 RX ORDER — DEXTROSE MONOHYDRATE 25 G/50ML
12.5 INJECTION, SOLUTION INTRAVENOUS PRN
Status: DISCONTINUED | OUTPATIENT
Start: 2021-01-01 | End: 2021-01-01 | Stop reason: HOSPADM

## 2021-01-01 RX ORDER — METHYLPREDNISOLONE SODIUM SUCCINATE 40 MG/ML
40 INJECTION, POWDER, LYOPHILIZED, FOR SOLUTION INTRAMUSCULAR; INTRAVENOUS EVERY 12 HOURS
Status: DISCONTINUED | OUTPATIENT
Start: 2021-01-01 | End: 2021-01-01 | Stop reason: HOSPADM

## 2021-01-01 RX ORDER — INSULIN LISPRO 100 [IU]/ML
0.05 INJECTION, SOLUTION INTRAVENOUS; SUBCUTANEOUS
Status: DISCONTINUED | OUTPATIENT
Start: 2021-01-01 | End: 2021-01-01 | Stop reason: HOSPADM

## 2021-01-01 RX ORDER — INSULIN LISPRO 100 [IU]/ML
0-3 INJECTION, SOLUTION INTRAVENOUS; SUBCUTANEOUS NIGHTLY
Status: DISCONTINUED | OUTPATIENT
Start: 2021-01-01 | End: 2021-01-01

## 2021-01-01 RX ORDER — DILTIAZEM HYDROCHLORIDE 120 MG/1
120 CAPSULE, COATED, EXTENDED RELEASE ORAL DAILY
Qty: 90 CAPSULE | Refills: 3 | Status: SHIPPED | OUTPATIENT
Start: 2021-01-01

## 2021-01-01 RX ORDER — MAGNESIUM SULFATE 1 G/100ML
1000 INJECTION INTRAVENOUS ONCE
Status: COMPLETED | OUTPATIENT
Start: 2021-01-01 | End: 2021-01-01

## 2021-01-01 RX ORDER — FUROSEMIDE 10 MG/ML
40 INJECTION INTRAMUSCULAR; INTRAVENOUS DAILY
Status: DISCONTINUED | OUTPATIENT
Start: 2021-01-01 | End: 2021-01-01

## 2021-01-01 RX ORDER — SPIRONOLACTONE 25 MG/1
12.5 TABLET ORAL DAILY
Status: DISCONTINUED | OUTPATIENT
Start: 2021-01-01 | End: 2021-01-01 | Stop reason: HOSPADM

## 2021-01-01 RX ORDER — SPIRONOLACTONE 25 MG/1
12.5 TABLET ORAL DAILY
Qty: 30 TABLET | Refills: 3 | Status: SHIPPED | OUTPATIENT
Start: 2021-01-01

## 2021-01-01 RX ORDER — POLYETHYLENE GLYCOL 3350 17 G/17G
17 POWDER, FOR SOLUTION ORAL DAILY PRN
Status: DISCONTINUED | OUTPATIENT
Start: 2021-01-01 | End: 2021-01-01

## 2021-01-01 RX ORDER — FUROSEMIDE 10 MG/ML
40 INJECTION INTRAMUSCULAR; INTRAVENOUS 2 TIMES DAILY
Status: DISCONTINUED | OUTPATIENT
Start: 2021-01-01 | End: 2021-01-01 | Stop reason: DRUGHIGH

## 2021-01-01 RX ORDER — LIDOCAINE HYDROCHLORIDE 10 MG/ML
5 INJECTION, SOLUTION EPIDURAL; INFILTRATION; INTRACAUDAL; PERINEURAL ONCE
Status: DISCONTINUED | OUTPATIENT
Start: 2021-01-01 | End: 2021-01-01 | Stop reason: ALTCHOICE

## 2021-01-01 RX ORDER — ALPRAZOLAM 0.5 MG/1
0.5 TABLET ORAL EVERY 8 HOURS PRN
Status: DISCONTINUED | OUTPATIENT
Start: 2021-01-01 | End: 2021-01-01 | Stop reason: HOSPADM

## 2021-01-01 RX ORDER — SODIUM CHLORIDE 0.9 % (FLUSH) 0.9 %
5-40 SYRINGE (ML) INJECTION PRN
Status: DISCONTINUED | OUTPATIENT
Start: 2021-01-01 | End: 2021-01-01 | Stop reason: HOSPADM

## 2021-01-01 RX ORDER — IPRATROPIUM BROMIDE AND ALBUTEROL SULFATE 2.5; .5 MG/3ML; MG/3ML
1 SOLUTION RESPIRATORY (INHALATION)
Status: DISCONTINUED | OUTPATIENT
Start: 2021-01-01 | End: 2021-01-01 | Stop reason: HOSPADM

## 2021-01-01 RX ORDER — FUROSEMIDE 20 MG/1
20 TABLET ORAL DAILY
Status: DISCONTINUED | OUTPATIENT
Start: 2021-01-01 | End: 2021-01-01

## 2021-01-01 RX ORDER — ZINC SULFATE 50(220)MG
50 CAPSULE ORAL DAILY
Status: DISCONTINUED | OUTPATIENT
Start: 2021-01-01 | End: 2021-01-01

## 2021-01-01 RX ORDER — ALBUTEROL SULFATE 2.5 MG/3ML
5 SOLUTION RESPIRATORY (INHALATION) ONCE
Status: COMPLETED | OUTPATIENT
Start: 2021-01-01 | End: 2021-01-01

## 2021-01-01 RX ORDER — INSULIN LISPRO 100 [IU]/ML
0-6 INJECTION, SOLUTION INTRAVENOUS; SUBCUTANEOUS
Status: DISCONTINUED | OUTPATIENT
Start: 2021-01-01 | End: 2021-01-01

## 2021-01-01 RX ORDER — CLOPIDOGREL BISULFATE 75 MG/1
75 TABLET ORAL DAILY
Status: DISCONTINUED | OUTPATIENT
Start: 2021-01-01 | End: 2021-01-01

## 2021-01-01 RX ORDER — ARFORMOTEROL TARTRATE 15 UG/2ML
15 SOLUTION RESPIRATORY (INHALATION) 2 TIMES DAILY
Status: DISCONTINUED | OUTPATIENT
Start: 2021-01-01 | End: 2021-01-01 | Stop reason: HOSPADM

## 2021-01-01 RX ORDER — IPRATROPIUM BROMIDE AND ALBUTEROL SULFATE 2.5; .5 MG/3ML; MG/3ML
1 SOLUTION RESPIRATORY (INHALATION) ONCE
Status: COMPLETED | OUTPATIENT
Start: 2021-01-01 | End: 2021-01-01

## 2021-01-01 RX ORDER — INSULIN LISPRO 100 [IU]/ML
0-6 INJECTION, SOLUTION INTRAVENOUS; SUBCUTANEOUS NIGHTLY
Status: DISCONTINUED | OUTPATIENT
Start: 2021-01-01 | End: 2021-01-01 | Stop reason: HOSPADM

## 2021-01-01 RX ORDER — PANTOPRAZOLE SODIUM 40 MG/1
40 TABLET, DELAYED RELEASE ORAL
Status: DISCONTINUED | OUTPATIENT
Start: 2021-01-01 | End: 2021-01-01 | Stop reason: HOSPADM

## 2021-01-01 RX ORDER — POLYETHYLENE GLYCOL 3350 17 G/17G
17 POWDER, FOR SOLUTION ORAL EVERY OTHER DAY
Status: DISCONTINUED | OUTPATIENT
Start: 2021-01-01 | End: 2021-01-01 | Stop reason: HOSPADM

## 2021-01-01 RX ORDER — LANCETS 33 GAUGE
EACH MISCELLANEOUS
Qty: 100 EACH | Refills: 12 | Status: SHIPPED | OUTPATIENT
Start: 2021-01-01

## 2021-01-01 RX ORDER — ACETAMINOPHEN 650 MG/1
650 SUPPOSITORY RECTAL EVERY 6 HOURS PRN
Status: DISCONTINUED | OUTPATIENT
Start: 2021-01-01 | End: 2021-01-01 | Stop reason: HOSPADM

## 2021-01-01 RX ORDER — ONDANSETRON 2 MG/ML
4 INJECTION INTRAMUSCULAR; INTRAVENOUS EVERY 6 HOURS PRN
Status: DISCONTINUED | OUTPATIENT
Start: 2021-01-01 | End: 2021-01-01 | Stop reason: HOSPADM

## 2021-01-01 RX ORDER — ACETAMINOPHEN 325 MG/1
650 TABLET ORAL EVERY 6 HOURS PRN
Status: DISCONTINUED | OUTPATIENT
Start: 2021-01-01 | End: 2021-01-01 | Stop reason: HOSPADM

## 2021-01-01 RX ORDER — FUROSEMIDE 10 MG/ML
40 INJECTION INTRAMUSCULAR; INTRAVENOUS ONCE
Status: COMPLETED | OUTPATIENT
Start: 2021-01-01 | End: 2021-01-01

## 2021-01-01 RX ORDER — NICOTINE POLACRILEX 4 MG
15 LOZENGE BUCCAL PRN
Status: DISCONTINUED | OUTPATIENT
Start: 2021-01-01 | End: 2021-01-01 | Stop reason: HOSPADM

## 2021-01-01 RX ORDER — METHYLPREDNISOLONE SODIUM SUCCINATE 125 MG/2ML
125 INJECTION, POWDER, LYOPHILIZED, FOR SOLUTION INTRAMUSCULAR; INTRAVENOUS ONCE
Status: COMPLETED | OUTPATIENT
Start: 2021-01-01 | End: 2021-01-01

## 2021-01-01 RX ORDER — MAGNESIUM SULFATE 1 G/100ML
1000 INJECTION INTRAVENOUS
Status: DISPENSED | OUTPATIENT
Start: 2021-01-01 | End: 2021-01-01

## 2021-01-01 RX ORDER — ATORVASTATIN CALCIUM 20 MG/1
20 TABLET, FILM COATED ORAL DAILY
Qty: 90 TABLET | Refills: 4 | Status: ON HOLD | OUTPATIENT
Start: 2021-01-01 | End: 2021-01-01 | Stop reason: HOSPADM

## 2021-01-01 RX ORDER — METHYLPREDNISOLONE SODIUM SUCCINATE 40 MG/ML
40 INJECTION, POWDER, LYOPHILIZED, FOR SOLUTION INTRAMUSCULAR; INTRAVENOUS EVERY 6 HOURS
Status: DISCONTINUED | OUTPATIENT
Start: 2021-01-01 | End: 2021-01-01

## 2021-01-01 RX ORDER — ARFORMOTEROL TARTRATE 15 UG/2ML
SOLUTION RESPIRATORY (INHALATION)
Status: COMPLETED
Start: 2021-01-01 | End: 2021-01-01

## 2021-01-01 RX ORDER — LORAZEPAM 0.5 MG/1
0.5 TABLET ORAL EVERY 8 HOURS PRN
Qty: 9 TABLET | Refills: 0 | Status: SHIPPED | OUTPATIENT
Start: 2021-01-01 | End: 2021-05-01

## 2021-01-01 RX ORDER — POTASSIUM CHLORIDE 7.45 MG/ML
10 INJECTION INTRAVENOUS PRN
Status: DISCONTINUED | OUTPATIENT
Start: 2021-01-01 | End: 2021-01-01 | Stop reason: HOSPADM

## 2021-01-01 RX ORDER — DILTIAZEM HYDROCHLORIDE 120 MG/1
120 CAPSULE, COATED, EXTENDED RELEASE ORAL DAILY
Qty: 90 CAPSULE | Refills: 3 | Status: SHIPPED | OUTPATIENT
Start: 2021-01-01 | End: 2021-01-01 | Stop reason: SDUPTHER

## 2021-01-01 RX ADMIN — CEFEPIME HYDROCHLORIDE 2000 MG: 2 INJECTION, POWDER, FOR SOLUTION INTRAVENOUS at 21:38

## 2021-01-01 RX ADMIN — POLYETHYLENE GLYCOL 3350 17 G: 17 POWDER, FOR SOLUTION ORAL at 09:58

## 2021-01-01 RX ADMIN — METHYLPREDNISOLONE SODIUM SUCCINATE 40 MG: 40 INJECTION, POWDER, FOR SOLUTION INTRAMUSCULAR; INTRAVENOUS at 21:25

## 2021-01-01 RX ADMIN — IPRATROPIUM BROMIDE AND ALBUTEROL SULFATE 1 AMPULE: .5; 3 SOLUTION RESPIRATORY (INHALATION) at 13:12

## 2021-01-01 RX ADMIN — BUDESONIDE 500 MCG: 0.5 SUSPENSION RESPIRATORY (INHALATION) at 20:25

## 2021-01-01 RX ADMIN — BUSPIRONE HYDROCHLORIDE 5 MG: 5 TABLET ORAL at 09:30

## 2021-01-01 RX ADMIN — METHYLPREDNISOLONE SODIUM SUCCINATE 40 MG: 40 INJECTION, POWDER, FOR SOLUTION INTRAMUSCULAR; INTRAVENOUS at 09:57

## 2021-01-01 RX ADMIN — METOPROLOL TARTRATE 25 MG: 25 TABLET, FILM COATED ORAL at 08:43

## 2021-01-01 RX ADMIN — ENOXAPARIN SODIUM 40 MG: 40 INJECTION SUBCUTANEOUS at 09:47

## 2021-01-01 RX ADMIN — BUSPIRONE HYDROCHLORIDE 5 MG: 5 TABLET ORAL at 09:58

## 2021-01-01 RX ADMIN — INSULIN LISPRO 1 UNITS: 100 INJECTION, SOLUTION INTRAVENOUS; SUBCUTANEOUS at 21:17

## 2021-01-01 RX ADMIN — ARFORMOTEROL TARTRATE 15 MCG: 15 SOLUTION RESPIRATORY (INHALATION) at 08:20

## 2021-01-01 RX ADMIN — ESCITALOPRAM OXALATE 5 MG: 10 TABLET ORAL at 09:05

## 2021-01-01 RX ADMIN — BUDESONIDE 500 MCG: 0.5 SUSPENSION RESPIRATORY (INHALATION) at 20:48

## 2021-01-01 RX ADMIN — INSULIN LISPRO 10 UNITS: 100 INJECTION, SOLUTION INTRAVENOUS; SUBCUTANEOUS at 11:37

## 2021-01-01 RX ADMIN — ARFORMOTEROL TARTRATE 15 MCG: 15 SOLUTION RESPIRATORY (INHALATION) at 21:23

## 2021-01-01 RX ADMIN — BUDESONIDE 500 MCG: 0.5 SUSPENSION RESPIRATORY (INHALATION) at 21:09

## 2021-01-01 RX ADMIN — INSULIN LISPRO 6 UNITS: 100 INJECTION, SOLUTION INTRAVENOUS; SUBCUTANEOUS at 12:47

## 2021-01-01 RX ADMIN — SPIRONOLACTONE 12.5 MG: 25 TABLET ORAL at 08:26

## 2021-01-01 RX ADMIN — CEFEPIME HYDROCHLORIDE 2000 MG: 2 INJECTION, POWDER, FOR SOLUTION INTRAVENOUS at 08:48

## 2021-01-01 RX ADMIN — DILTIAZEM HYDROCHLORIDE 120 MG: 120 CAPSULE, EXTENDED RELEASE ORAL at 09:02

## 2021-01-01 RX ADMIN — IPRATROPIUM BROMIDE AND ALBUTEROL SULFATE 1 AMPULE: .5; 3 SOLUTION RESPIRATORY (INHALATION) at 09:08

## 2021-01-01 RX ADMIN — ARFORMOTEROL TARTRATE 15 MCG: 15 SOLUTION RESPIRATORY (INHALATION) at 20:53

## 2021-01-01 RX ADMIN — INSULIN LISPRO 4 UNITS: 100 INJECTION, SOLUTION INTRAVENOUS; SUBCUTANEOUS at 09:57

## 2021-01-01 RX ADMIN — IPRATROPIUM BROMIDE AND ALBUTEROL SULFATE 1 AMPULE: .5; 3 SOLUTION RESPIRATORY (INHALATION) at 17:04

## 2021-01-01 RX ADMIN — METHYLPREDNISOLONE SODIUM SUCCINATE 40 MG: 40 INJECTION, POWDER, FOR SOLUTION INTRAMUSCULAR; INTRAVENOUS at 08:43

## 2021-01-01 RX ADMIN — BUDESONIDE 500 MCG: 0.5 SUSPENSION RESPIRATORY (INHALATION) at 09:30

## 2021-01-01 RX ADMIN — SPIRONOLACTONE 12.5 MG: 25 TABLET ORAL at 09:30

## 2021-01-01 RX ADMIN — Medication 10 ML: at 20:16

## 2021-01-01 RX ADMIN — ARFORMOTEROL TARTRATE 15 MCG: 15 SOLUTION RESPIRATORY (INHALATION) at 20:13

## 2021-01-01 RX ADMIN — IRON SUCROSE 200 MG: 20 INJECTION, SOLUTION INTRAVENOUS at 15:29

## 2021-01-01 RX ADMIN — SPIRONOLACTONE 12.5 MG: 25 TABLET ORAL at 09:57

## 2021-01-01 RX ADMIN — METHYLPREDNISOLONE SODIUM SUCCINATE 40 MG: 40 INJECTION, POWDER, FOR SOLUTION INTRAMUSCULAR; INTRAVENOUS at 10:14

## 2021-01-01 RX ADMIN — METHYLPREDNISOLONE SODIUM SUCCINATE 40 MG: 40 INJECTION, POWDER, FOR SOLUTION INTRAMUSCULAR; INTRAVENOUS at 15:12

## 2021-01-01 RX ADMIN — INSULIN LISPRO 4 UNITS: 100 INJECTION, SOLUTION INTRAVENOUS; SUBCUTANEOUS at 17:57

## 2021-01-01 RX ADMIN — INSULIN LISPRO 4 UNITS: 100 INJECTION, SOLUTION INTRAVENOUS; SUBCUTANEOUS at 12:26

## 2021-01-01 RX ADMIN — METOPROLOL TARTRATE 25 MG: 25 TABLET, FILM COATED ORAL at 09:30

## 2021-01-01 RX ADMIN — IPRATROPIUM BROMIDE AND ALBUTEROL SULFATE 1 AMPULE: .5; 3 SOLUTION RESPIRATORY (INHALATION) at 07:22

## 2021-01-01 RX ADMIN — METHYLPREDNISOLONE SODIUM SUCCINATE 40 MG: 40 INJECTION, POWDER, FOR SOLUTION INTRAMUSCULAR; INTRAVENOUS at 21:16

## 2021-01-01 RX ADMIN — METOPROLOL TARTRATE 25 MG: 25 TABLET, FILM COATED ORAL at 20:37

## 2021-01-01 RX ADMIN — BUDESONIDE 500 MCG: 0.5 SUSPENSION RESPIRATORY (INHALATION) at 08:24

## 2021-01-01 RX ADMIN — METHYLPREDNISOLONE SODIUM SUCCINATE 40 MG: 40 INJECTION, POWDER, FOR SOLUTION INTRAMUSCULAR; INTRAVENOUS at 09:06

## 2021-01-01 RX ADMIN — INSULIN LISPRO 6 UNITS: 100 INJECTION, SOLUTION INTRAVENOUS; SUBCUTANEOUS at 12:24

## 2021-01-01 RX ADMIN — METOPROLOL TARTRATE 25 MG: 25 TABLET, FILM COATED ORAL at 21:24

## 2021-01-01 RX ADMIN — SODIUM CHLORIDE 25 ML: 9 INJECTION, SOLUTION INTRAVENOUS at 08:26

## 2021-01-01 RX ADMIN — PANTOPRAZOLE SODIUM 40 MG: 40 TABLET, DELAYED RELEASE ORAL at 05:59

## 2021-01-01 RX ADMIN — INSULIN LISPRO 4 UNITS: 100 INJECTION, SOLUTION INTRAVENOUS; SUBCUTANEOUS at 17:20

## 2021-01-01 RX ADMIN — INSULIN GLARGINE 13 UNITS: 100 INJECTION, SOLUTION SUBCUTANEOUS at 21:44

## 2021-01-01 RX ADMIN — ALPRAZOLAM 0.5 MG: 0.5 TABLET ORAL at 13:05

## 2021-01-01 RX ADMIN — METHYLPREDNISOLONE SODIUM SUCCINATE 40 MG: 40 INJECTION, POWDER, FOR SOLUTION INTRAMUSCULAR; INTRAVENOUS at 08:23

## 2021-01-01 RX ADMIN — IPRATROPIUM BROMIDE AND ALBUTEROL SULFATE 1 AMPULE: .5; 3 SOLUTION RESPIRATORY (INHALATION) at 20:12

## 2021-01-01 RX ADMIN — BUSPIRONE HYDROCHLORIDE 5 MG: 5 TABLET ORAL at 08:43

## 2021-01-01 RX ADMIN — Medication 10 ML: at 09:32

## 2021-01-01 RX ADMIN — BUSPIRONE HYDROCHLORIDE 5 MG: 5 TABLET ORAL at 08:04

## 2021-01-01 RX ADMIN — ESCITALOPRAM OXALATE 5 MG: 10 TABLET ORAL at 08:42

## 2021-01-01 RX ADMIN — BUSPIRONE HYDROCHLORIDE 5 MG: 5 TABLET ORAL at 21:21

## 2021-01-01 RX ADMIN — INSULIN LISPRO 3 UNITS: 100 INJECTION, SOLUTION INTRAVENOUS; SUBCUTANEOUS at 10:29

## 2021-01-01 RX ADMIN — BUDESONIDE 500 MCG: 0.5 SUSPENSION RESPIRATORY (INHALATION) at 09:02

## 2021-01-01 RX ADMIN — Medication 10 ML: at 08:43

## 2021-01-01 RX ADMIN — CEFEPIME HYDROCHLORIDE 2000 MG: 2 INJECTION, POWDER, FOR SOLUTION INTRAVENOUS at 21:07

## 2021-01-01 RX ADMIN — METHYLPREDNISOLONE SODIUM SUCCINATE 40 MG: 40 INJECTION, POWDER, FOR SOLUTION INTRAMUSCULAR; INTRAVENOUS at 21:26

## 2021-01-01 RX ADMIN — IPRATROPIUM BROMIDE AND ALBUTEROL SULFATE 1 AMPULE: .5; 3 SOLUTION RESPIRATORY (INHALATION) at 16:16

## 2021-01-01 RX ADMIN — METHYLPREDNISOLONE SODIUM SUCCINATE 125 MG: 125 INJECTION, POWDER, FOR SOLUTION INTRAMUSCULAR; INTRAVENOUS at 20:02

## 2021-01-01 RX ADMIN — IPRATROPIUM BROMIDE AND ALBUTEROL SULFATE 1 AMPULE: .5; 3 SOLUTION RESPIRATORY (INHALATION) at 19:43

## 2021-01-01 RX ADMIN — BUSPIRONE HYDROCHLORIDE 5 MG: 5 TABLET ORAL at 17:57

## 2021-01-01 RX ADMIN — ENOXAPARIN SODIUM 40 MG: 40 INJECTION SUBCUTANEOUS at 08:54

## 2021-01-01 RX ADMIN — SPIRONOLACTONE 12.5 MG: 25 TABLET ORAL at 08:54

## 2021-01-01 RX ADMIN — INSULIN LISPRO 4 UNITS: 100 INJECTION, SOLUTION INTRAVENOUS; SUBCUTANEOUS at 17:24

## 2021-01-01 RX ADMIN — METOPROLOL TARTRATE 25 MG: 25 TABLET, FILM COATED ORAL at 20:16

## 2021-01-01 RX ADMIN — INSULIN LISPRO 1 UNITS: 100 INJECTION, SOLUTION INTRAVENOUS; SUBCUTANEOUS at 20:00

## 2021-01-01 RX ADMIN — ARFORMOTEROL TARTRATE 15 MCG: 15 SOLUTION RESPIRATORY (INHALATION) at 19:43

## 2021-01-01 RX ADMIN — CEFEPIME HYDROCHLORIDE 2000 MG: 2 INJECTION, POWDER, FOR SOLUTION INTRAVENOUS at 08:28

## 2021-01-01 RX ADMIN — METOPROLOL TARTRATE 25 MG: 25 TABLET, FILM COATED ORAL at 08:32

## 2021-01-01 RX ADMIN — ALLOPURINOL 100 MG: 100 TABLET ORAL at 09:38

## 2021-01-01 RX ADMIN — POLYETHYLENE GLYCOL 3350 17 G: 17 POWDER, FOR SOLUTION ORAL at 08:53

## 2021-01-01 RX ADMIN — INSULIN LISPRO 2 UNITS: 100 INJECTION, SOLUTION INTRAVENOUS; SUBCUTANEOUS at 21:42

## 2021-01-01 RX ADMIN — METOPROLOL TARTRATE 25 MG: 25 TABLET, FILM COATED ORAL at 21:51

## 2021-01-01 RX ADMIN — POLYETHYLENE GLYCOL 3350 17 G: 17 POWDER, FOR SOLUTION ORAL at 14:26

## 2021-01-01 RX ADMIN — CLOPIDOGREL BISULFATE 75 MG: 75 TABLET ORAL at 09:29

## 2021-01-01 RX ADMIN — BUSPIRONE HYDROCHLORIDE 5 MG: 5 TABLET ORAL at 09:05

## 2021-01-01 RX ADMIN — IPRATROPIUM BROMIDE AND ALBUTEROL SULFATE 1 AMPULE: .5; 3 SOLUTION RESPIRATORY (INHALATION) at 16:42

## 2021-01-01 RX ADMIN — ESCITALOPRAM OXALATE 5 MG: 10 TABLET ORAL at 09:58

## 2021-01-01 RX ADMIN — INSULIN LISPRO 4 UNITS: 100 INJECTION, SOLUTION INTRAVENOUS; SUBCUTANEOUS at 12:39

## 2021-01-01 RX ADMIN — BUSPIRONE HYDROCHLORIDE 5 MG: 5 TABLET ORAL at 08:32

## 2021-01-01 RX ADMIN — IPRATROPIUM BROMIDE AND ALBUTEROL SULFATE 1 AMPULE: .5; 3 SOLUTION RESPIRATORY (INHALATION) at 15:40

## 2021-01-01 RX ADMIN — FUROSEMIDE 5 MG/HR: 10 INJECTION, SOLUTION INTRAMUSCULAR; INTRAVENOUS at 08:14

## 2021-01-01 RX ADMIN — FUROSEMIDE 20 MG: 20 TABLET ORAL at 16:20

## 2021-01-01 RX ADMIN — ARFORMOTEROL TARTRATE 15 MCG: 15 SOLUTION RESPIRATORY (INHALATION) at 20:10

## 2021-01-01 RX ADMIN — BUSPIRONE HYDROCHLORIDE 5 MG: 5 TABLET ORAL at 21:10

## 2021-01-01 RX ADMIN — INSULIN LISPRO 1 UNITS: 100 INJECTION, SOLUTION INTRAVENOUS; SUBCUTANEOUS at 15:30

## 2021-01-01 RX ADMIN — INSULIN LISPRO 4 UNITS: 100 INJECTION, SOLUTION INTRAVENOUS; SUBCUTANEOUS at 16:41

## 2021-01-01 RX ADMIN — DILTIAZEM HYDROCHLORIDE 120 MG: 120 CAPSULE, EXTENDED RELEASE ORAL at 08:23

## 2021-01-01 RX ADMIN — METHYLPREDNISOLONE SODIUM SUCCINATE 40 MG: 40 INJECTION, POWDER, FOR SOLUTION INTRAMUSCULAR; INTRAVENOUS at 20:38

## 2021-01-01 RX ADMIN — CEFEPIME HYDROCHLORIDE 2000 MG: 2 INJECTION, POWDER, FOR SOLUTION INTRAVENOUS at 09:55

## 2021-01-01 RX ADMIN — INSULIN LISPRO 2 UNITS: 100 INJECTION, SOLUTION INTRAVENOUS; SUBCUTANEOUS at 10:26

## 2021-01-01 RX ADMIN — IPRATROPIUM BROMIDE AND ALBUTEROL SULFATE 1 AMPULE: .5; 3 SOLUTION RESPIRATORY (INHALATION) at 08:20

## 2021-01-01 RX ADMIN — SODIUM CHLORIDE 25 ML: 9 INJECTION, SOLUTION INTRAVENOUS at 10:03

## 2021-01-01 RX ADMIN — CEFEPIME HYDROCHLORIDE 2000 MG: 2 INJECTION, POWDER, FOR SOLUTION INTRAVENOUS at 21:22

## 2021-01-01 RX ADMIN — INSULIN LISPRO 2 UNITS: 100 INJECTION, SOLUTION INTRAVENOUS; SUBCUTANEOUS at 08:56

## 2021-01-01 RX ADMIN — METOPROLOL TARTRATE 25 MG: 25 TABLET, FILM COATED ORAL at 08:04

## 2021-01-01 RX ADMIN — MAGNESIUM SULFATE HEPTAHYDRATE 1000 MG: 1 INJECTION, SOLUTION INTRAVENOUS at 16:52

## 2021-01-01 RX ADMIN — ARFORMOTEROL TARTRATE 15 MCG: 15 SOLUTION RESPIRATORY (INHALATION) at 08:24

## 2021-01-01 RX ADMIN — Medication 10 ML: at 10:16

## 2021-01-01 RX ADMIN — BUDESONIDE 500 MCG: 0.5 SUSPENSION RESPIRATORY (INHALATION) at 09:19

## 2021-01-01 RX ADMIN — METHYLPREDNISOLONE SODIUM SUCCINATE 40 MG: 40 INJECTION, POWDER, FOR SOLUTION INTRAMUSCULAR; INTRAVENOUS at 21:51

## 2021-01-01 RX ADMIN — Medication 10 ML: at 21:05

## 2021-01-01 RX ADMIN — SODIUM CHLORIDE 25 ML: 9 INJECTION, SOLUTION INTRAVENOUS at 15:38

## 2021-01-01 RX ADMIN — SPIRONOLACTONE 12.5 MG: 25 TABLET ORAL at 08:32

## 2021-01-01 RX ADMIN — INSULIN LISPRO 4 UNITS: 100 INJECTION, SOLUTION INTRAVENOUS; SUBCUTANEOUS at 17:22

## 2021-01-01 RX ADMIN — INSULIN LISPRO 1 UNITS: 100 INJECTION, SOLUTION INTRAVENOUS; SUBCUTANEOUS at 21:10

## 2021-01-01 RX ADMIN — Medication 10 ML: at 20:44

## 2021-01-01 RX ADMIN — Medication 10 ML: at 21:22

## 2021-01-01 RX ADMIN — SPIRONOLACTONE 12.5 MG: 25 TABLET ORAL at 08:23

## 2021-01-01 RX ADMIN — ENOXAPARIN SODIUM 40 MG: 40 INJECTION SUBCUTANEOUS at 09:05

## 2021-01-01 RX ADMIN — BUSPIRONE HYDROCHLORIDE 5 MG: 5 TABLET ORAL at 13:06

## 2021-01-01 RX ADMIN — METOPROLOL TARTRATE 25 MG: 25 TABLET, FILM COATED ORAL at 21:02

## 2021-01-01 RX ADMIN — CLOPIDOGREL BISULFATE 75 MG: 75 TABLET ORAL at 08:24

## 2021-01-01 RX ADMIN — IPRATROPIUM BROMIDE AND ALBUTEROL SULFATE 1 AMPULE: .5; 3 SOLUTION RESPIRATORY (INHALATION) at 16:57

## 2021-01-01 RX ADMIN — DILTIAZEM HYDROCHLORIDE 120 MG: 120 CAPSULE, EXTENDED RELEASE ORAL at 08:25

## 2021-01-01 RX ADMIN — ATORVASTATIN CALCIUM 20 MG: 20 TABLET, FILM COATED ORAL at 21:01

## 2021-01-01 RX ADMIN — ESCITALOPRAM OXALATE 5 MG: 10 TABLET ORAL at 08:54

## 2021-01-01 RX ADMIN — INSULIN LISPRO 4 UNITS: 100 INJECTION, SOLUTION INTRAVENOUS; SUBCUTANEOUS at 09:32

## 2021-01-01 RX ADMIN — BUSPIRONE HYDROCHLORIDE 5 MG: 5 TABLET ORAL at 16:21

## 2021-01-01 RX ADMIN — ALLOPURINOL 100 MG: 100 TABLET ORAL at 08:53

## 2021-01-01 RX ADMIN — SODIUM CHLORIDE 25 ML: 9 INJECTION, SOLUTION INTRAVENOUS at 09:01

## 2021-01-01 RX ADMIN — ALBUTEROL SULFATE 5 MG: 2.5 SOLUTION RESPIRATORY (INHALATION) at 20:14

## 2021-01-01 RX ADMIN — IPRATROPIUM BROMIDE AND ALBUTEROL SULFATE 1 AMPULE: .5; 3 SOLUTION RESPIRATORY (INHALATION) at 19:56

## 2021-01-01 RX ADMIN — BUDESONIDE 500 MCG: 0.5 SUSPENSION RESPIRATORY (INHALATION) at 09:08

## 2021-01-01 RX ADMIN — ENOXAPARIN SODIUM 40 MG: 40 INJECTION SUBCUTANEOUS at 08:24

## 2021-01-01 RX ADMIN — METHYLPREDNISOLONE SODIUM SUCCINATE 40 MG: 40 INJECTION, POWDER, FOR SOLUTION INTRAMUSCULAR; INTRAVENOUS at 08:26

## 2021-01-01 RX ADMIN — BUDESONIDE 500 MCG: 0.5 SUSPENSION RESPIRATORY (INHALATION) at 08:25

## 2021-01-01 RX ADMIN — INSULIN LISPRO 4 UNITS: 100 INJECTION, SOLUTION INTRAVENOUS; SUBCUTANEOUS at 08:44

## 2021-01-01 RX ADMIN — INSULIN LISPRO 4 UNITS: 100 INJECTION, SOLUTION INTRAVENOUS; SUBCUTANEOUS at 09:18

## 2021-01-01 RX ADMIN — ARFORMOTEROL TARTRATE 15 MCG: 15 SOLUTION RESPIRATORY (INHALATION) at 09:08

## 2021-01-01 RX ADMIN — INSULIN LISPRO 2 UNITS: 100 INJECTION, SOLUTION INTRAVENOUS; SUBCUTANEOUS at 08:40

## 2021-01-01 RX ADMIN — ESCITALOPRAM OXALATE 5 MG: 10 TABLET ORAL at 08:32

## 2021-01-01 RX ADMIN — ENOXAPARIN SODIUM 40 MG: 40 INJECTION SUBCUTANEOUS at 09:36

## 2021-01-01 RX ADMIN — IPRATROPIUM BROMIDE AND ALBUTEROL SULFATE 1 AMPULE: .5; 3 SOLUTION RESPIRATORY (INHALATION) at 15:19

## 2021-01-01 RX ADMIN — CEFEPIME HYDROCHLORIDE 2000 MG: 2 INJECTION, POWDER, FOR SOLUTION INTRAVENOUS at 20:24

## 2021-01-01 RX ADMIN — BUDESONIDE 500 MCG: 0.5 SUSPENSION RESPIRATORY (INHALATION) at 19:44

## 2021-01-01 RX ADMIN — FUROSEMIDE 40 MG: 10 INJECTION, SOLUTION INTRAMUSCULAR; INTRAVENOUS at 23:43

## 2021-01-01 RX ADMIN — CEFEPIME HYDROCHLORIDE 2000 MG: 2 INJECTION, POWDER, FOR SOLUTION INTRAVENOUS at 08:31

## 2021-01-01 RX ADMIN — IPRATROPIUM BROMIDE AND ALBUTEROL SULFATE 1 AMPULE: .5; 3 SOLUTION RESPIRATORY (INHALATION) at 12:27

## 2021-01-01 RX ADMIN — METHYLPREDNISOLONE SODIUM SUCCINATE 40 MG: 40 INJECTION, POWDER, FOR SOLUTION INTRAMUSCULAR; INTRAVENOUS at 21:02

## 2021-01-01 RX ADMIN — METOPROLOL TARTRATE 25 MG: 25 TABLET, FILM COATED ORAL at 09:39

## 2021-01-01 RX ADMIN — INSULIN LISPRO 4 UNITS: 100 INJECTION, SOLUTION INTRAVENOUS; SUBCUTANEOUS at 13:47

## 2021-01-01 RX ADMIN — ENOXAPARIN SODIUM 40 MG: 40 INJECTION SUBCUTANEOUS at 08:04

## 2021-01-01 RX ADMIN — DILTIAZEM HYDROCHLORIDE 120 MG: 120 CAPSULE, EXTENDED RELEASE ORAL at 08:43

## 2021-01-01 RX ADMIN — ATORVASTATIN CALCIUM 20 MG: 20 TABLET, FILM COATED ORAL at 22:09

## 2021-01-01 RX ADMIN — Medication 10 ML: at 09:31

## 2021-01-01 RX ADMIN — INSULIN LISPRO 4 UNITS: 100 INJECTION, SOLUTION INTRAVENOUS; SUBCUTANEOUS at 11:54

## 2021-01-01 RX ADMIN — ALLOPURINOL 100 MG: 100 TABLET ORAL at 08:23

## 2021-01-01 RX ADMIN — BUDESONIDE 500 MCG: 0.5 SUSPENSION RESPIRATORY (INHALATION) at 07:23

## 2021-01-01 RX ADMIN — DILTIAZEM HYDROCHLORIDE 120 MG: 120 CAPSULE, EXTENDED RELEASE ORAL at 09:05

## 2021-01-01 RX ADMIN — METHYLPREDNISOLONE SODIUM SUCCINATE 40 MG: 40 INJECTION, POWDER, FOR SOLUTION INTRAMUSCULAR; INTRAVENOUS at 08:53

## 2021-01-01 RX ADMIN — DILTIAZEM HYDROCHLORIDE 120 MG: 120 CAPSULE, EXTENDED RELEASE ORAL at 09:58

## 2021-01-01 RX ADMIN — BUDESONIDE 500 MCG: 0.5 SUSPENSION RESPIRATORY (INHALATION) at 19:56

## 2021-01-01 RX ADMIN — CLOPIDOGREL BISULFATE 75 MG: 75 TABLET ORAL at 09:05

## 2021-01-01 RX ADMIN — FUROSEMIDE 5 MG/HR: 10 INJECTION, SOLUTION INTRAMUSCULAR; INTRAVENOUS at 17:46

## 2021-01-01 RX ADMIN — IPRATROPIUM BROMIDE AND ALBUTEROL SULFATE 1 AMPULE: .5; 3 SOLUTION RESPIRATORY (INHALATION) at 20:13

## 2021-01-01 RX ADMIN — ALLOPURINOL 100 MG: 100 TABLET ORAL at 09:58

## 2021-01-01 RX ADMIN — Medication 10 ML: at 21:34

## 2021-01-01 RX ADMIN — ATORVASTATIN CALCIUM 20 MG: 20 TABLET, FILM COATED ORAL at 21:34

## 2021-01-01 RX ADMIN — ESCITALOPRAM OXALATE 5 MG: 10 TABLET ORAL at 09:39

## 2021-01-01 RX ADMIN — METHYLPREDNISOLONE SODIUM SUCCINATE 40 MG: 40 INJECTION, POWDER, FOR SOLUTION INTRAMUSCULAR; INTRAVENOUS at 09:32

## 2021-01-01 RX ADMIN — IPRATROPIUM BROMIDE AND ALBUTEROL SULFATE 1 AMPULE: .5; 3 SOLUTION RESPIRATORY (INHALATION) at 20:25

## 2021-01-01 RX ADMIN — INSULIN GLARGINE 13 UNITS: 100 INJECTION, SOLUTION SUBCUTANEOUS at 21:25

## 2021-01-01 RX ADMIN — BUSPIRONE HYDROCHLORIDE 5 MG: 5 TABLET ORAL at 15:12

## 2021-01-01 RX ADMIN — INSULIN LISPRO 4 UNITS: 100 INJECTION, SOLUTION INTRAVENOUS; SUBCUTANEOUS at 09:20

## 2021-01-01 RX ADMIN — INSULIN LISPRO 4 UNITS: 100 INJECTION, SOLUTION INTRAVENOUS; SUBCUTANEOUS at 09:13

## 2021-01-01 RX ADMIN — ARFORMOTEROL TARTRATE 15 MCG: 15 SOLUTION RESPIRATORY (INHALATION) at 21:09

## 2021-01-01 RX ADMIN — IPRATROPIUM BROMIDE AND ALBUTEROL SULFATE 1 AMPULE: .5; 3 SOLUTION RESPIRATORY (INHALATION) at 13:16

## 2021-01-01 RX ADMIN — BUDESONIDE 500 MCG: 0.5 SUSPENSION RESPIRATORY (INHALATION) at 21:28

## 2021-01-01 RX ADMIN — INSULIN GLARGINE 13 UNITS: 100 INJECTION, SOLUTION SUBCUTANEOUS at 21:17

## 2021-01-01 RX ADMIN — METOPROLOL TARTRATE 25 MG: 25 TABLET, FILM COATED ORAL at 21:21

## 2021-01-01 RX ADMIN — METHYLPREDNISOLONE SODIUM SUCCINATE 40 MG: 40 INJECTION, POWDER, FOR SOLUTION INTRAMUSCULAR; INTRAVENOUS at 16:16

## 2021-01-01 RX ADMIN — FUROSEMIDE 5 MG/HR: 10 INJECTION, SOLUTION INTRAMUSCULAR; INTRAVENOUS at 23:52

## 2021-01-01 RX ADMIN — ALLOPURINOL 100 MG: 100 TABLET ORAL at 08:03

## 2021-01-01 RX ADMIN — ARFORMOTEROL TARTRATE 15 MCG: 15 SOLUTION RESPIRATORY (INHALATION) at 07:23

## 2021-01-01 RX ADMIN — BUSPIRONE HYDROCHLORIDE 5 MG: 5 TABLET ORAL at 20:37

## 2021-01-01 RX ADMIN — INSULIN LISPRO 2 UNITS: 100 INJECTION, SOLUTION INTRAVENOUS; SUBCUTANEOUS at 10:00

## 2021-01-01 RX ADMIN — METOPROLOL TARTRATE 25 MG: 25 TABLET, FILM COATED ORAL at 09:58

## 2021-01-01 RX ADMIN — INSULIN LISPRO 1 UNITS: 100 INJECTION, SOLUTION INTRAVENOUS; SUBCUTANEOUS at 22:48

## 2021-01-01 RX ADMIN — INSULIN LISPRO 4 UNITS: 100 INJECTION, SOLUTION INTRAVENOUS; SUBCUTANEOUS at 12:24

## 2021-01-01 RX ADMIN — Medication 10 ML: at 10:30

## 2021-01-01 RX ADMIN — IPRATROPIUM BROMIDE AND ALBUTEROL SULFATE 1 AMPULE: .5; 3 SOLUTION RESPIRATORY (INHALATION) at 12:04

## 2021-01-01 RX ADMIN — IPRATROPIUM BROMIDE AND ALBUTEROL SULFATE 1 AMPULE: .5; 3 SOLUTION RESPIRATORY (INHALATION) at 08:24

## 2021-01-01 RX ADMIN — BUDESONIDE 500 MCG: 0.5 SUSPENSION RESPIRATORY (INHALATION) at 08:21

## 2021-01-01 RX ADMIN — SODIUM CHLORIDE 25 ML: 9 INJECTION, SOLUTION INTRAVENOUS at 14:23

## 2021-01-01 RX ADMIN — INSULIN LISPRO 4 UNITS: 100 INJECTION, SOLUTION INTRAVENOUS; SUBCUTANEOUS at 08:43

## 2021-01-01 RX ADMIN — METHYLPREDNISOLONE SODIUM SUCCINATE 40 MG: 40 INJECTION, POWDER, FOR SOLUTION INTRAMUSCULAR; INTRAVENOUS at 21:34

## 2021-01-01 RX ADMIN — INSULIN LISPRO 2 UNITS: 100 INJECTION, SOLUTION INTRAVENOUS; SUBCUTANEOUS at 08:05

## 2021-01-01 RX ADMIN — INSULIN LISPRO 8 UNITS: 100 INJECTION, SOLUTION INTRAVENOUS; SUBCUTANEOUS at 12:22

## 2021-01-01 RX ADMIN — CEFEPIME HYDROCHLORIDE 2000 MG: 2 INJECTION, POWDER, FOR SOLUTION INTRAVENOUS at 21:00

## 2021-01-01 RX ADMIN — IPRATROPIUM BROMIDE AND ALBUTEROL SULFATE 1 AMPULE: .5; 3 SOLUTION RESPIRATORY (INHALATION) at 09:03

## 2021-01-01 RX ADMIN — ATORVASTATIN CALCIUM 20 MG: 20 TABLET, FILM COATED ORAL at 20:37

## 2021-01-01 RX ADMIN — INSULIN LISPRO 3 UNITS: 100 INJECTION, SOLUTION INTRAVENOUS; SUBCUTANEOUS at 21:43

## 2021-01-01 RX ADMIN — ATORVASTATIN CALCIUM 20 MG: 20 TABLET, FILM COATED ORAL at 21:24

## 2021-01-01 RX ADMIN — SPIRONOLACTONE 12.5 MG: 25 TABLET ORAL at 08:43

## 2021-01-01 RX ADMIN — ESCITALOPRAM OXALATE 5 MG: 10 TABLET ORAL at 08:23

## 2021-01-01 RX ADMIN — Medication 10 ML: at 21:08

## 2021-01-01 RX ADMIN — ENOXAPARIN SODIUM 40 MG: 40 INJECTION SUBCUTANEOUS at 08:32

## 2021-01-01 RX ADMIN — CLOPIDOGREL BISULFATE 75 MG: 75 TABLET ORAL at 08:23

## 2021-01-01 RX ADMIN — ENOXAPARIN SODIUM 40 MG: 40 INJECTION SUBCUTANEOUS at 08:43

## 2021-01-01 RX ADMIN — Medication 10 ML: at 09:59

## 2021-01-01 RX ADMIN — IRON SUCROSE 200 MG: 20 INJECTION, SOLUTION INTRAVENOUS at 15:19

## 2021-01-01 RX ADMIN — ALLOPURINOL 100 MG: 100 TABLET ORAL at 09:29

## 2021-01-01 RX ADMIN — FUROSEMIDE 5 MG/HR: 10 INJECTION, SOLUTION INTRAMUSCULAR; INTRAVENOUS at 07:47

## 2021-01-01 RX ADMIN — IPRATROPIUM BROMIDE AND ALBUTEROL SULFATE 1 AMPULE: .5; 3 SOLUTION RESPIRATORY (INHALATION) at 21:20

## 2021-01-01 RX ADMIN — ALLOPURINOL 100 MG: 100 TABLET ORAL at 08:32

## 2021-01-01 RX ADMIN — FUROSEMIDE 5 MG/HR: 10 INJECTION, SOLUTION INTRAMUSCULAR; INTRAVENOUS at 08:00

## 2021-01-01 RX ADMIN — METOPROLOL TARTRATE 25 MG: 25 TABLET, FILM COATED ORAL at 08:53

## 2021-01-01 RX ADMIN — FUROSEMIDE 40 MG: 10 INJECTION, SOLUTION INTRAMUSCULAR; INTRAVENOUS at 08:23

## 2021-01-01 RX ADMIN — SODIUM CHLORIDE 25 ML: 9 INJECTION, SOLUTION INTRAVENOUS at 15:28

## 2021-01-01 RX ADMIN — INSULIN LISPRO 4 UNITS: 100 INJECTION, SOLUTION INTRAVENOUS; SUBCUTANEOUS at 08:32

## 2021-01-01 RX ADMIN — IPRATROPIUM BROMIDE AND ALBUTEROL SULFATE 1 AMPULE: .5; 3 SOLUTION RESPIRATORY (INHALATION) at 21:09

## 2021-01-01 RX ADMIN — IPRATROPIUM BROMIDE AND ALBUTEROL SULFATE 1 AMPULE: .5; 3 SOLUTION RESPIRATORY (INHALATION) at 12:40

## 2021-01-01 RX ADMIN — ARFORMOTEROL TARTRATE 15 MCG: 15 SOLUTION RESPIRATORY (INHALATION) at 09:35

## 2021-01-01 RX ADMIN — IPRATROPIUM BROMIDE AND ALBUTEROL SULFATE 1 AMPULE: .5; 3 SOLUTION RESPIRATORY (INHALATION) at 12:05

## 2021-01-01 RX ADMIN — IRON SUCROSE 200 MG: 20 INJECTION, SOLUTION INTRAVENOUS at 14:23

## 2021-01-01 RX ADMIN — ESCITALOPRAM OXALATE 5 MG: 10 TABLET ORAL at 08:03

## 2021-01-01 RX ADMIN — BUSPIRONE HYDROCHLORIDE 5 MG: 5 TABLET ORAL at 14:25

## 2021-01-01 RX ADMIN — INSULIN LISPRO 3 UNITS: 100 INJECTION, SOLUTION INTRAVENOUS; SUBCUTANEOUS at 12:41

## 2021-01-01 RX ADMIN — INSULIN LISPRO 2 UNITS: 100 INJECTION, SOLUTION INTRAVENOUS; SUBCUTANEOUS at 09:11

## 2021-01-01 RX ADMIN — METOPROLOL TARTRATE 25 MG: 25 TABLET, FILM COATED ORAL at 21:33

## 2021-01-01 RX ADMIN — BUSPIRONE HYDROCHLORIDE 5 MG: 5 TABLET ORAL at 14:24

## 2021-01-01 RX ADMIN — PANTOPRAZOLE SODIUM 40 MG: 40 TABLET, DELAYED RELEASE ORAL at 09:59

## 2021-01-01 RX ADMIN — INSULIN LISPRO 4 UNITS: 100 INJECTION, SOLUTION INTRAVENOUS; SUBCUTANEOUS at 10:25

## 2021-01-01 RX ADMIN — FUROSEMIDE 5 MG/HR: 10 INJECTION, SOLUTION INTRAMUSCULAR; INTRAVENOUS at 10:22

## 2021-01-01 RX ADMIN — INSULIN LISPRO 4 UNITS: 100 INJECTION, SOLUTION INTRAVENOUS; SUBCUTANEOUS at 08:57

## 2021-01-01 RX ADMIN — IPRATROPIUM BROMIDE AND ALBUTEROL SULFATE 1 AMPULE: .5; 3 SOLUTION RESPIRATORY (INHALATION) at 13:48

## 2021-01-01 RX ADMIN — BUSPIRONE HYDROCHLORIDE 5 MG: 5 TABLET ORAL at 21:51

## 2021-01-01 RX ADMIN — ARFORMOTEROL TARTRATE 15 MCG: 15 SOLUTION RESPIRATORY (INHALATION) at 19:56

## 2021-01-01 RX ADMIN — INSULIN LISPRO 8 UNITS: 100 INJECTION, SOLUTION INTRAVENOUS; SUBCUTANEOUS at 12:43

## 2021-01-01 RX ADMIN — INSULIN LISPRO 10 UNITS: 100 INJECTION, SOLUTION INTRAVENOUS; SUBCUTANEOUS at 11:52

## 2021-01-01 RX ADMIN — IPRATROPIUM BROMIDE AND ALBUTEROL SULFATE 1 AMPULE: .5; 3 SOLUTION RESPIRATORY (INHALATION) at 15:34

## 2021-01-01 RX ADMIN — CEFEPIME HYDROCHLORIDE 2000 MG: 2 INJECTION, POWDER, FOR SOLUTION INTRAVENOUS at 10:09

## 2021-01-01 RX ADMIN — CEFEPIME HYDROCHLORIDE 2000 MG: 2 INJECTION, POWDER, FOR SOLUTION INTRAVENOUS at 20:40

## 2021-01-01 RX ADMIN — MAGNESIUM SULFATE HEPTAHYDRATE 1000 MG: 1 INJECTION, SOLUTION INTRAVENOUS at 15:41

## 2021-01-01 RX ADMIN — INSULIN LISPRO 4 UNITS: 100 INJECTION, SOLUTION INTRAVENOUS; SUBCUTANEOUS at 12:37

## 2021-01-01 RX ADMIN — IPRATROPIUM BROMIDE AND ALBUTEROL SULFATE 1 AMPULE: .5; 3 SOLUTION RESPIRATORY (INHALATION) at 16:03

## 2021-01-01 RX ADMIN — BUSPIRONE HYDROCHLORIDE 5 MG: 5 TABLET ORAL at 08:25

## 2021-01-01 RX ADMIN — ESCITALOPRAM OXALATE 5 MG: 10 TABLET ORAL at 08:26

## 2021-01-01 RX ADMIN — Medication 10 ML: at 08:33

## 2021-01-01 RX ADMIN — IPRATROPIUM BROMIDE AND ALBUTEROL SULFATE 1 AMPULE: .5; 3 SOLUTION RESPIRATORY (INHALATION) at 20:08

## 2021-01-01 RX ADMIN — BUSPIRONE HYDROCHLORIDE 5 MG: 5 TABLET ORAL at 21:24

## 2021-01-01 RX ADMIN — FUROSEMIDE 5 MG/HR: 10 INJECTION, SOLUTION INTRAMUSCULAR; INTRAVENOUS at 07:44

## 2021-01-01 RX ADMIN — METOPROLOL TARTRATE 25 MG: 25 TABLET, FILM COATED ORAL at 21:08

## 2021-01-01 RX ADMIN — DILTIAZEM HYDROCHLORIDE 120 MG: 120 CAPSULE, EXTENDED RELEASE ORAL at 08:03

## 2021-01-01 RX ADMIN — BUSPIRONE HYDROCHLORIDE 5 MG: 5 TABLET ORAL at 08:23

## 2021-01-01 RX ADMIN — CLOPIDOGREL BISULFATE 75 MG: 75 TABLET ORAL at 08:03

## 2021-01-01 RX ADMIN — IPRATROPIUM BROMIDE AND ALBUTEROL SULFATE 1 AMPULE: .5; 3 SOLUTION RESPIRATORY (INHALATION) at 12:13

## 2021-01-01 RX ADMIN — INSULIN LISPRO 2 UNITS: 100 INJECTION, SOLUTION INTRAVENOUS; SUBCUTANEOUS at 17:56

## 2021-01-01 RX ADMIN — PANTOPRAZOLE SODIUM 40 MG: 40 TABLET, DELAYED RELEASE ORAL at 09:26

## 2021-01-01 RX ADMIN — Medication 10 ML: at 09:58

## 2021-01-01 RX ADMIN — PANTOPRAZOLE SODIUM 40 MG: 40 TABLET, DELAYED RELEASE ORAL at 05:34

## 2021-01-01 RX ADMIN — CEFEPIME HYDROCHLORIDE 2000 MG: 2 INJECTION, POWDER, FOR SOLUTION INTRAVENOUS at 21:50

## 2021-01-01 RX ADMIN — INSULIN LISPRO 4 UNITS: 100 INJECTION, SOLUTION INTRAVENOUS; SUBCUTANEOUS at 08:41

## 2021-01-01 RX ADMIN — FUROSEMIDE 20 MG: 10 INJECTION, SOLUTION INTRAMUSCULAR; INTRAVENOUS at 08:55

## 2021-01-01 RX ADMIN — ARFORMOTEROL TARTRATE 15 MCG: 15 SOLUTION RESPIRATORY (INHALATION) at 09:19

## 2021-01-01 RX ADMIN — BUSPIRONE HYDROCHLORIDE 5 MG: 5 TABLET ORAL at 20:16

## 2021-01-01 RX ADMIN — BUDESONIDE 500 MCG: 0.5 SUSPENSION RESPIRATORY (INHALATION) at 20:08

## 2021-01-01 RX ADMIN — ESCITALOPRAM OXALATE 5 MG: 10 TABLET ORAL at 09:30

## 2021-01-01 RX ADMIN — BUSPIRONE HYDROCHLORIDE 5 MG: 5 TABLET ORAL at 21:01

## 2021-01-01 RX ADMIN — INSULIN LISPRO 4 UNITS: 100 INJECTION, SOLUTION INTRAVENOUS; SUBCUTANEOUS at 13:23

## 2021-01-01 RX ADMIN — ATORVASTATIN CALCIUM 20 MG: 20 TABLET, FILM COATED ORAL at 21:21

## 2021-01-01 RX ADMIN — Medication 10 ML: at 09:53

## 2021-01-01 RX ADMIN — CEFEPIME HYDROCHLORIDE 2000 MG: 2 INJECTION, POWDER, FOR SOLUTION INTRAVENOUS at 09:41

## 2021-01-01 RX ADMIN — ARFORMOTEROL TARTRATE 15 MCG: 15 SOLUTION RESPIRATORY (INHALATION) at 08:25

## 2021-01-01 RX ADMIN — IPRATROPIUM BROMIDE AND ALBUTEROL SULFATE 1 AMPULE: .5; 3 SOLUTION RESPIRATORY (INHALATION) at 20:14

## 2021-01-01 RX ADMIN — FUROSEMIDE 5 MG/HR: 10 INJECTION, SOLUTION INTRAMUSCULAR; INTRAVENOUS at 07:43

## 2021-01-01 RX ADMIN — DILTIAZEM HYDROCHLORIDE 120 MG: 120 CAPSULE, EXTENDED RELEASE ORAL at 09:39

## 2021-01-01 RX ADMIN — Medication 10 ML: at 09:25

## 2021-01-01 RX ADMIN — INSULIN LISPRO 4 UNITS: 100 INJECTION, SOLUTION INTRAVENOUS; SUBCUTANEOUS at 16:28

## 2021-01-01 RX ADMIN — IPRATROPIUM BROMIDE AND ALBUTEROL SULFATE 1 AMPULE: .5; 3 SOLUTION RESPIRATORY (INHALATION) at 11:37

## 2021-01-01 RX ADMIN — METHYLPREDNISOLONE SODIUM SUCCINATE 40 MG: 40 INJECTION, POWDER, FOR SOLUTION INTRAMUSCULAR; INTRAVENOUS at 04:20

## 2021-01-01 RX ADMIN — BUDESONIDE 500 MCG: 0.5 SUSPENSION RESPIRATORY (INHALATION) at 20:12

## 2021-01-01 RX ADMIN — METHYLPREDNISOLONE SODIUM SUCCINATE 40 MG: 40 INJECTION, POWDER, FOR SOLUTION INTRAMUSCULAR; INTRAVENOUS at 09:29

## 2021-01-01 RX ADMIN — ATORVASTATIN CALCIUM 20 MG: 20 TABLET, FILM COATED ORAL at 20:16

## 2021-01-01 RX ADMIN — DILTIAZEM HYDROCHLORIDE 120 MG: 120 CAPSULE, EXTENDED RELEASE ORAL at 09:29

## 2021-01-01 RX ADMIN — METHYLPREDNISOLONE SODIUM SUCCINATE 40 MG: 40 INJECTION, POWDER, FOR SOLUTION INTRAMUSCULAR; INTRAVENOUS at 08:32

## 2021-01-01 RX ADMIN — FUROSEMIDE 20 MG: 20 TABLET ORAL at 08:32

## 2021-01-01 RX ADMIN — FUROSEMIDE 40 MG: 10 INJECTION, SOLUTION INTRAMUSCULAR; INTRAVENOUS at 09:29

## 2021-01-01 RX ADMIN — IPRATROPIUM BROMIDE AND ALBUTEROL SULFATE 1 AMPULE: .5; 3 SOLUTION RESPIRATORY (INHALATION) at 20:40

## 2021-01-01 RX ADMIN — BUSPIRONE HYDROCHLORIDE 5 MG: 5 TABLET ORAL at 21:33

## 2021-01-01 RX ADMIN — ALLOPURINOL 100 MG: 100 TABLET ORAL at 08:25

## 2021-01-01 RX ADMIN — ARFORMOTEROL TARTRATE 15 MCG: 15 SOLUTION RESPIRATORY (INHALATION) at 09:12

## 2021-01-01 RX ADMIN — IPRATROPIUM BROMIDE AND ALBUTEROL SULFATE 1 AMPULE: .5; 3 SOLUTION RESPIRATORY (INHALATION) at 08:25

## 2021-01-01 RX ADMIN — ATORVASTATIN CALCIUM 20 MG: 20 TABLET, FILM COATED ORAL at 21:08

## 2021-01-01 RX ADMIN — ALLOPURINOL 100 MG: 100 TABLET ORAL at 09:05

## 2021-01-01 RX ADMIN — METHYLPREDNISOLONE SODIUM SUCCINATE 40 MG: 40 INJECTION, POWDER, FOR SOLUTION INTRAMUSCULAR; INTRAVENOUS at 04:49

## 2021-01-01 RX ADMIN — IPRATROPIUM BROMIDE AND ALBUTEROL SULFATE 1 AMPULE: .5; 3 SOLUTION RESPIRATORY (INHALATION) at 09:19

## 2021-01-01 RX ADMIN — BUSPIRONE HYDROCHLORIDE 5 MG: 5 TABLET ORAL at 15:22

## 2021-01-01 RX ADMIN — ACETAMINOPHEN 650 MG: 325 TABLET ORAL at 21:37

## 2021-01-01 RX ADMIN — SODIUM CHLORIDE 25 ML: 9 INJECTION, SOLUTION INTRAVENOUS at 09:39

## 2021-01-01 RX ADMIN — INSULIN LISPRO 4 UNITS: 100 INJECTION, SOLUTION INTRAVENOUS; SUBCUTANEOUS at 13:51

## 2021-01-01 RX ADMIN — METHYLPREDNISOLONE SODIUM SUCCINATE 40 MG: 40 INJECTION, POWDER, FOR SOLUTION INTRAMUSCULAR; INTRAVENOUS at 20:16

## 2021-01-01 RX ADMIN — Medication 10 ML: at 08:54

## 2021-01-01 RX ADMIN — CEFEPIME HYDROCHLORIDE 2000 MG: 2 INJECTION, POWDER, FOR SOLUTION INTRAVENOUS at 09:02

## 2021-01-01 RX ADMIN — METOPROLOL TARTRATE 25 MG: 25 TABLET, FILM COATED ORAL at 08:23

## 2021-01-01 RX ADMIN — INSULIN LISPRO 8 UNITS: 100 INJECTION, SOLUTION INTRAVENOUS; SUBCUTANEOUS at 09:33

## 2021-01-01 RX ADMIN — METHYLPREDNISOLONE SODIUM SUCCINATE 40 MG: 40 INJECTION, POWDER, FOR SOLUTION INTRAMUSCULAR; INTRAVENOUS at 21:08

## 2021-01-01 RX ADMIN — METOPROLOL TARTRATE 25 MG: 25 TABLET, FILM COATED ORAL at 21:10

## 2021-01-01 RX ADMIN — INSULIN LISPRO 4 UNITS: 100 INJECTION, SOLUTION INTRAVENOUS; SUBCUTANEOUS at 15:31

## 2021-01-01 RX ADMIN — ACETAMINOPHEN 650 MG: 325 TABLET ORAL at 12:09

## 2021-01-01 RX ADMIN — BUSPIRONE HYDROCHLORIDE 5 MG: 5 TABLET ORAL at 08:53

## 2021-01-01 RX ADMIN — SODIUM CHLORIDE 25 ML: 9 INJECTION, SOLUTION INTRAVENOUS at 08:48

## 2021-01-01 RX ADMIN — BUSPIRONE HYDROCHLORIDE 5 MG: 5 TABLET ORAL at 09:39

## 2021-01-01 RX ADMIN — ACETAMINOPHEN 650 MG: 325 TABLET ORAL at 15:05

## 2021-01-01 RX ADMIN — Medication 20 ML: at 21:02

## 2021-01-01 RX ADMIN — ARFORMOTEROL TARTRATE 15 MCG: 15 SOLUTION RESPIRATORY (INHALATION) at 20:25

## 2021-01-01 RX ADMIN — METOPROLOL TARTRATE 25 MG: 25 TABLET, FILM COATED ORAL at 08:24

## 2021-01-01 RX ADMIN — INSULIN LISPRO 4 UNITS: 100 INJECTION, SOLUTION INTRAVENOUS; SUBCUTANEOUS at 08:06

## 2021-01-01 RX ADMIN — ALLOPURINOL 100 MG: 100 TABLET ORAL at 08:43

## 2021-01-01 RX ADMIN — INSULIN LISPRO 2 UNITS: 100 INJECTION, SOLUTION INTRAVENOUS; SUBCUTANEOUS at 12:36

## 2021-01-01 RX ADMIN — Medication 10 ML: at 21:09

## 2021-01-01 RX ADMIN — ARFORMOTEROL TARTRATE 15 MCG: 15 SOLUTION RESPIRATORY (INHALATION) at 20:12

## 2021-01-01 RX ADMIN — BUSPIRONE HYDROCHLORIDE 5 MG: 5 TABLET ORAL at 21:08

## 2021-01-01 RX ADMIN — IPRATROPIUM BROMIDE AND ALBUTEROL SULFATE 1 AMPULE: .5; 3 SOLUTION RESPIRATORY (INHALATION) at 09:30

## 2021-01-01 RX ADMIN — ENOXAPARIN SODIUM 40 MG: 40 INJECTION SUBCUTANEOUS at 09:58

## 2021-01-01 RX ADMIN — DILTIAZEM HYDROCHLORIDE 120 MG: 120 CAPSULE, EXTENDED RELEASE ORAL at 08:38

## 2021-01-01 RX ADMIN — INSULIN LISPRO 4 UNITS: 100 INJECTION, SOLUTION INTRAVENOUS; SUBCUTANEOUS at 11:38

## 2021-01-01 RX ADMIN — CLOPIDOGREL BISULFATE 75 MG: 75 TABLET ORAL at 09:38

## 2021-01-01 RX ADMIN — SODIUM CHLORIDE 25 ML: 9 INJECTION, SOLUTION INTRAVENOUS at 15:18

## 2021-01-01 RX ADMIN — BUSPIRONE HYDROCHLORIDE 5 MG: 5 TABLET ORAL at 15:21

## 2021-01-01 RX ADMIN — BUDESONIDE 500 MCG: 0.5 SUSPENSION RESPIRATORY (INHALATION) at 20:13

## 2021-01-01 RX ADMIN — METOPROLOL TARTRATE 25 MG: 25 TABLET, FILM COATED ORAL at 09:05

## 2021-01-01 RX ADMIN — Medication 10 ML: at 21:52

## 2021-01-01 RX ADMIN — ATORVASTATIN CALCIUM 20 MG: 20 TABLET, FILM COATED ORAL at 21:10

## 2021-01-01 RX ADMIN — BUSPIRONE HYDROCHLORIDE 5 MG: 5 TABLET ORAL at 14:44

## 2021-01-01 SDOH — HEALTH STABILITY: MENTAL HEALTH: HOW OFTEN DO YOU HAVE A DRINK CONTAINING ALCOHOL?: 4 OR MORE TIMES A WEEK

## 2021-01-01 ASSESSMENT — PAIN SCALES - GENERAL
PAINLEVEL_OUTOF10: 0
PAINLEVEL_OUTOF10: 6
PAINLEVEL_OUTOF10: 0
PAINLEVEL_OUTOF10: 1
PAINLEVEL_OUTOF10: 0
PAINLEVEL_OUTOF10: 3
PAINLEVEL_OUTOF10: 3
PAINLEVEL_OUTOF10: 0
PAINLEVEL_OUTOF10: 3

## 2021-01-01 ASSESSMENT — PAIN DESCRIPTION - PAIN TYPE: TYPE: ACUTE PAIN

## 2021-01-01 ASSESSMENT — ENCOUNTER SYMPTOMS
DIARRHEA: 0
WHEEZING: 0
RHINORRHEA: 0
COUGH: 1
NAUSEA: 0
SHORTNESS OF BREATH: 1
VOMITING: 0
ABDOMINAL PAIN: 0

## 2021-01-01 ASSESSMENT — PAIN DESCRIPTION - PROGRESSION
CLINICAL_PROGRESSION: NOT CHANGED

## 2021-01-01 ASSESSMENT — PAIN DESCRIPTION - DESCRIPTORS: DESCRIPTORS: ACHING

## 2021-01-01 ASSESSMENT — PAIN DESCRIPTION - LOCATION
LOCATION: ARM
LOCATION: HEAD

## 2021-01-14 NOTE — PROGRESS NOTES
Patient dropped off urine and UA was done. Dr. Sebastian Rodriguez states that dip looks negative and patient needs to increase fluids. Patient's wife informed.

## 2021-02-04 NOTE — TELEPHONE ENCOUNTER
From: Wiliam Broderick  To: Daiana French MD  Sent: 2/4/2021 10:47 AM EST  Subject: Prescription Question    Hi Dr Jose G Flynn is need of a stairlift in our home and I was told that if I have a prescription from you I can avoid paying sales tax. Is that something you can supply?     Thanks for your help     Eloisa

## 2021-02-22 NOTE — TELEPHONE ENCOUNTER
Patient's wife called to say patient's nebulizer is not working correctly and not pushing the medication through. She said she contacted [employer] and we could send an order for a new machine. If you have any questions, please call [phone].

## 2021-02-24 NOTE — PROGRESS NOTES
[address]  Cardiac Consult     Referring Provider:  Daiana French MD     Chief Complaint   Patient presents with    1 Year Follow Up     Patient return to office for yearly f/u     Hypertension    Coronary Artery Disease    Hyperlipidemia        History of Present Illness:  80 y.o. male with end stage lung disease seen in f/u for SVT. He has had episodes of tachycardia for 20 years. Sudden onset heart racing. Lasts for a few minutes. No exacerbating factors. Relieved by valsalva. No associated chest pain. HR from 150-190. He has been on Toprol for this for years. This was decreased to 25 mg in May due to low BP apparently. He was seen and ECHO and MCOT ordered. ECHO with normal LV. Enlarged RV with severe pulmonary HTN, RVSP=70 mmHg. MCOT shows episodes of SVT and possible NSVT. One episode SVT 45 minutes. Very symptomatic. Rates 170. Usually can valsalva with resolution. Cardizem 120 added with improvement. He is doing OK. Palpitations resolved. He is now on 9 liters O2. Sats drop into low 80's if he tries to walk. Past Medical History:   has a past medical history of Arthritis, Emphysema of lung ([address] Full dentures, Hearing aid worn, Hypertension, On home oxygen therapy, Pulmonary fibrosis ([address] and Sleep apnea. 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 Social History:  Social History     Tobacco Use    Smoking status: Former Smoker     Packs/day: 2.00     Years: 50.00     Pack years: 100.00     Types: Cigarettes     Quit date: 2001     Years since quittin.1    Smokeless tobacco: Never Used   Substance Use Topics    Alcohol use:  Yes     Alcohol/week: 8.0 standard drinks     Types: 8 Cans of beer per week Frequency: 4 or more times a week     Drinks per session: 5 or 6     Binge frequency: Weekly     Comment: 8 drinks per week        Family History:  family history includes Alcohol Abuse in his father; Cancer in his brother and brother; Priyanka Ng in his brother, sister, and sister; Early Death (age of onset: 43) in his mother; Emphysema in his brother; Kidney Disease in his brother; Orvel Alexis in his brother; Other in his brother. Allergies:  Aspirin and Dilaudid [hydromorphone hcl]     Home Medications:  Prior to Visit Medications    Medication Sig Taking?  Authorizing Provider   atorvastatin (LIPITOR) 20 MG tablet Take 1 tablet by mouth daily Yes Olivia Castellon MD   clopidogrel (PLAVIX) 75 MG tablet Take 1 tablet by mouth daily Yes Deep Victor MD   lisinopril (PRINIVIL;ZESTRIL) 5 MG tablet Take 1 tablet by mouth daily Yes Deep Victor MD   alendronate (FOSAMAX) 70 MG tablet Take 1 tablet by mouth every 7 days Yes Deep Victor MD   tiotropium ([place]ma [place]) 18 MCG inhalation capsule Inhale 1 capsule into the lungs daily Yes Deep Victor MD   escitalopram (LEXAPRO) 5 MG tablet Take 1 tablet by mouth daily Yes Deep Victor MD   busPIRone (BUSPAR) 5 MG tablet Take 1 tablet by mouth 3 times daily Yes Deep Victor MD   allopurinol (ZYLOPRIM) 100 MG tablet TAKE 1 TABLET DAILY Yes Deep Victor MD   metoprolol tartrate (LOPRESSOR) 25 MG tablet Take 1 tablet by mouth 2 times daily Yes Olivia Castellon MD   dilTIAZem (CARDIZEM CD) 120 MG extended release capsule Take 1 capsule by mouth daily Yes Olivia Castellon MD   budesonide (PULMICORT) 0.5 MG/2ML nebulizer suspension Take 2 mLs by nebulization 2 times daily DX:COPD J44.9 Yes Milton Sagastume MD   Arformoterol Tartrate (BROVANA) 15 MCG/2ML NEBU Take 1 ampule by nebulization 2 times daily DX:COPD J44.9 Yes Milton Sagastume MD albuterol (PROVENTIL) (2.5 MG/3ML) 0.083% nebulizer solution Take 3 mLs by nebulization every 6 hours as needed for Wheezing DX J44.9, J43.2, J84.10 Yes Yuan Oconnor MD   melatonin 3 MG TABS tablet Take 5 mg by mouth daily Yes Historical Provider, MD   predniSONE (DELTASONE) 20 MG tablet Take 20 mg by mouth daily Yes Historical Provider, MD   albuterol sulfate HFA (PROAIR HFA) 108 (90 Base) MCG/ACT inhaler Inhale 2 puffs into the lungs daily as needed for Shortness of Breath Yes Yuan Oconnor MD   ONETOUCH VERIO strip USE TO TEST DAILY Yes Yuan Oconnor MD   calcium carbonate 600 MG TABS tablet Take 1 tablet by mouth daily  Yes Historical Provider, MD Rene Hurd LANCETS 30L MISC Use to check blood sugar once daily. E11.9 Yes Yuan Oconnor MD   Zinc 50 MG TABS Take 50 mg by mouth daily. Yes Historical Provider, MD   Ascorbic Acid (VITAMIN C) 500 MG tablet Take 500 mg by mouth daily. Yes Historical Provider, MD   VITAMIN D, ERGOCALCIFEROL, PO Take 5,000 Units by mouth Daily  Yes Historical Provider, MD   Cyanocobalamin (VITAMIN B 12 PO) Take 500 mcg by mouth daily  Yes Historical Provider, MD       [x] Medications and dosages reviewed.     ROS:  [x]Full ROS obtained and negative except as mentioned in HPI      Physical Examination:    Vitals:    02/24/21 1110   BP: 102/60   Site: Left Upper Arm   Position: Sitting   Cuff Size: Large Adult   Pulse: 90   Resp: 23   SpO2: 98%   Weight: 191 lb 12.8 oz (87 kg)   Height: 5' 5\" (1.651 m)        · GENERAL: Elderly, chronically ill appearing male on O2 appearing mildly dyspneic   · NEUROLOGICAL: Alert and oriented  · PSYCH: Calm, pleasant affect  · SKIN: Warm and dry, No visible rash,   · EYES: Pupils equal and round, Sclera non-icteric,   · HENT:  External ears and nose unremarkable, mucus membranes moist  · MUSCULOSKELETAL: Normal cephalic, neck supple  · CAROTID: Normal upstroke, no bruits · CARDIAC: JVP normal, Normal PMI, regular rate and rhythm, normal S1S2, No murmur, rub, or gallop  · RESPIRATORY: Normal respiratory effort, Poor air movement. Minimal wheezing  · EXTREMITIES: No LE edema  · GASTROINTESTINAL: normal bowel sounds, soft, non-tender, No hepatomegaly     ECHO 8/2019  Summary   -Normal left ventricle size, wall thickness, and systolic function with an   estimated ejection fraction of 70%.   -No regional wall motion abnormalities are seen.   -Aortic valve appears sclerotic but opens adequately. -Focally thickened mitral valve without evidence of stenosis. There is mild   mitral regurgitation.   -There is moderate tricuspid regurgitation with a RVSP estimation of 70   mmHg. c/w severe pulmonary HTN   -Grade II diastolic dysfunction with elevated LV filling pressures. Avg.   E/e'=16.4   -The right ventricle is mildly enlarged with normal function. LABS 12/2020  Creat=0.9  LDL=81  AST/ALT=22/20  H/H=13/41    Assessment:     Tachycardia/palpitation:  Improved. Continue current treatmnet. Mostly SVT  Cardizem added with symptomatic improvement. Poor candidate to consider ablation. HTN:  /60 (Site: Left Upper Arm, Position: Sitting, Cuff Size: Large Adult)   Pulse 90   Resp 23   Ht 5' 5\" (1.651 m)   Wt 191 lb 12.8 oz (87 kg)   SpO2 98%   BMI 31.92 kg/m²   Well controlled. Continue current therapy    Pulmonary HTN:  Severe. Secondary to pulmonary fibrosis  Unchanged. F/u pulmonary    Plan:  Stable cardiac status  F/u with virtual visit 1 yr.     Thank you for allowing me to participate in the care of this individual.      Eduard Dockery M.D., [facility]

## 2021-03-03 NOTE — TELEPHONE ENCOUNTER
Pt wife called and left msg. Pt is on antibiotic (doxycycline), pt was coughing up green phlegm. She also asked if we would write a letter/prescription for a stair lift so they don't have to pay sales tax. She asked if we could email it to: [email or web address]     # [phone]

## 2021-03-03 NOTE — TELEPHONE ENCOUNTER
Wife called requesting the order form for [facility]. She is having the stair lift installed today and need the order for them to help avoid the sales tax. Advised her the forms are not located in the front office and Daniel Iglesias is not in today. When Paul Presser returns to office, we will check with her on where the form is.       Provider out of office      Please advise

## 2021-03-23 NOTE — TELEPHONE ENCOUNTER
From: Marquita Lobo  To: Nataliia Black MD  Sent: 3/23/2021 12:28 PM EDT  Subject: Prescription Question    Could you please send in a script for lancers and strips to [facility]?   Thanks   Amisha

## 2021-04-05 NOTE — PROGRESS NOTES
Patient is here today complaining of dizziness. Symptoms started 1-2 week(s) ago. States dizziness feels like vertigo, lightheadedness and wooziness. Symptoms are worse with rising from supine position. BP at home running 91/50, yesterday, by evening was 116/61. HR has been running high off and on up to 120's. Nausea and/or vomiting: no  Tinnitus/ringing in ears: yes  Numbness or tingling: no  Weakness: yes  Recent cold symptoms: no    Has history of vertigo: no    Has been coughing up more white sticky mucous and coughing more since last week, started antibiotics on friday - doxy and started a steroid taper - starting at 40mg and tapering for 10 days until goes to 20mg . Has also been taking Mucinex with no improvement. States feeling about the same. Has been feeling more sob as well but sats 90-92 at rest. States feeling more \"tightness\" in chest - complained aobut it Saturday and used a cough drop and helped some but states has been pretty constant with the other sx. Wife also states that patient is \"nervous\" again, mostly in the AM, once takes meds seems some better. Doesn't take buspar in AM for about 2 hrs after waking due to other morning routine. Has been having more swelling in legs over past month. Last night getting out of tub and hit back of right thigh near knee and small tear and now leaking clear fluid. Body mass index is 31.38 kg/m². Vitals:    04/05/21 1133   BP: (!) 90/48   Site: Left Upper Arm   Position: Sitting   Cuff Size: Medium Adult   Pulse: 75   Temp: 97.3 °F (36.3 °C)   TempSrc: Infrared   SpO2: 90%   Weight: 188 lb 9.6 oz (85.5 kg)     Wt Readings from Last 3 Encounters:   04/05/21 188 lb 9.6 oz (85.5 kg)   02/24/21 191 lb 12.8 oz (87 kg)   06/18/20 189 lb (85.7 kg)     PHQ score: No data recorded    GENERAL:Alert and oriented x 4 NAD, affect appropriate and obese, well hydrated, well developed.   LUNG: decreased but clear to auscultation bilaterally   CV: Normal

## 2021-04-05 NOTE — PATIENT INSTRUCTIONS
Daily weights     Patient Education        Potassium-Rich Diet: Care Instructions  Your Care Instructions     Potassium is a mineral. It helps keep the right mix of fluids in your body. It also helps your nerves and muscles work as they should. You'll find it in milk and meats. It's also in all fresh foods, including fruits and vegetables. Most adults need about 5 grams of potassium a day. The foods you eat should supply all that you need. Some health conditions can cause a loss of potassium. For example, kidney problems and stomach problems with vomiting and diarrhea can cause you to lose this mineral. Some medicines, such as water pills (diuretics), can cause low potassium. If you can't get enough potassium from what you eat, your doctor may advise you to take supplements. Follow-up care is a key part of your treatment and safety. Be sure to make and go to all appointments, and call your doctor if you are having problems. It's also a good idea to know your test results and keep a list of the medicines you take. How can you care for yourself at home? · Plan your diet around foods that are rich in potassium. Fresh, unprocessed whole foods have the most. These foods include:  ? Milk and other dairy products. ? Vegetables, especially broccoli, cooked dry beans, tomatoes, potatoes, artichokes, winter squash, and spinach. ? Fruits, especially citrus fruits, bananas, and apricots. Dried apricots contain more potassium than fresh apricots. ? Meat, poultry, and fish. · Ask your doctor about using a salt substitute or \"light\" salt. These often contain potassium. Where can you learn more? Go to [email or web address] org and sign in to your [employer] account. Enter H315 in the [employer] box to learn more about \"Potassium-Rich Diet: Care Instructions. \"     If you do not have an account, please click on the \"Sign Up Now\" link.   Current as of: December 17, 2020               Content Version: 12.8  © 9414-0108 Healthwise, Incorporated. Care instructions adapted under license by [facility] ([facility]). If you have questions about a medical condition or this instruction, always ask your healthcare professional. [address] any warranty or liability for your use of this information.

## 2021-04-09 NOTE — TELEPHONE ENCOUNTER
Pt wife called and said that pt is having thick mucus in his throat.     Call her to discuss    [phone]

## 2021-04-13 NOTE — TELEPHONE ENCOUNTER
Saw PCP after original message low BP swelling in his legs and told he was retaining fluid Has now lost 5 lbs. Continues to have real thick phlegm  That is sticky Chokes him in the mornings. What about saline in nebulizer?

## 2021-04-18 PROBLEM — I50.33 ACUTE ON CHRONIC DIASTOLIC HEART FAILURE (HCC): Status: ACTIVE | Noted: 2021-01-01

## 2021-04-19 NOTE — CARE COORDINATION
[facility] Case Management Discharge Planning Assessment     RN/SW discharge planner met with patient (and family member) to discuss reason for admission,   current living situation, and potential needs at the time of discharge    Insurance/Demographics Verified:  Medicare / [employer]   [] Apartment [] Condo [] Hotel [] Homeless [x] House [] Shelter   [] AL/IL/SNF           []  LTC     Confirmed w/:   Living w/: [] Alone [] Children [] Parent [x] Spouse Nayana Thakkar -  [phone])   Spouse is caregiver     Primary Care Provider / Last visit to PCP   Savannah Zuniga x2/weeks   Specialty Providers   [] C/ [address] / DME   [] no DME [x] Independent w/ ADLs [] Dependent w/ ADLs   [] BiPAP/CPAP [] BSC [] Cane [] Rollator   [] Hospital Bed [x] [address] [address] continous    [x] Scooter [x] Shower Chair [] Lia Fritz [] Wheelchair   chair lift     Current Community Services   [] n/a  []  CoA: w/: [] HD [] PD   [] HHC           [] [place]  [] Nursing   [] PT  [] OT  [] SW   wife is setup through [employer] to be caretaker. If [facility] for skilled services are needed family is amenable to this. Medication compliance issues:  none identified    Financial issues that could impact healthcare:   none identified    Discussed and provided facilities of choice if transition to a skilled nursing facility is required at   the time of discharge. Tentative Discharge Planning   [] Acute Rehab [] Home Alone [x] Home w/ Family   [] [facility]                                                                  [] [place]   []  RN [] PT [] OT [] SW   [] Hospice [] LTAC/Select   [] SNF /  [] LTC   HHC if needed, would prefer not going to SNF     Discussed with patient and/or family that on the day of discharge home tentative time of   discharge will be between 10 AM and noon.      Transportation at the time of discharge:   family    MARIBELL Duran, RN   RN Case

## 2021-04-19 NOTE — ED PROVIDER NOTES
[facility]        Pt Name: Nahomy Nagy  MRN: [id number]  [place] 10/3/1932  Date of evaluation: 4/18/2021  Provider: Kena Anderson PA-C  PCP: Belinda Koo MD     I have seen and evaluated this patient with my supervising physician Mario Jeff, [facility]       Chief Complaint   Patient presents with    Shortness of Breath     Presented from home via EMS with shortness of breath. Found on 11L NC satting 87%, currently on 100% NRB. Pt history of COPD and Pulm. fibrosis. HISTORY OF PRESENT ILLNESS   (Location, Timing/Onset, Context/Setting, Quality, Duration, Modifying Factors, Severity, Associated Signs and Symptoms)  Note limiting factors. Nahomy Nagy is a 80 y.o. male who presents to the emergency department today for evaluation for shortness of breath. The patient has a history of COPD/emphysema, history of pulmonary fibrosis. The patient is followed by Dr. Grace Lui, pulmonology. The patient states that since Friday he has had increasing cough, and increasing shortness of breath. The wife is at bedside and she states that the cough has been productive of thick mucus, and so she states that she started him on doxycycline as prescribed by Dr. Taisha Bravo. She states that she also started him on the prednisone. She states that the patient's shortness of breath has been worse over the past 3 days, when patient arrived to the ED he is on a nonrebreather, and O2 sats are 89% on room air the patient denies any chest pain. He is not had any fever or chills. Is not had any abdominal pain, nausea, vomiting or diarrhea. No urinary symptoms. Patient has not had any congestion, he states that he has been vaccinated for COVID-19. He states that he has noticed some lower leg edema but does not feel that he has gained any weight, and the patient otherwise has no complaints at this time.     Nursing Notes were all reviewed and agreed with or any disagreements were addressed in the HPI. REVIEW OF SYSTEMS    (2-9 systems for level 4, 10 or more for level 5)     Review of Systems   Constitutional: Negative for activity change, appetite change, chills and fever. HENT: Negative for congestion and rhinorrhea. Respiratory: Positive for cough and shortness of breath. Negative for wheezing. Cardiovascular: Negative for chest pain. Gastrointestinal: Negative for abdominal pain, diarrhea, nausea and vomiting. Genitourinary: Negative for difficulty urinating, dysuria and hematuria. Positives and Pertinent negatives as per HPI. Except as noted above in the ROS, all other systems were reviewed and negative. PAST MEDICAL HISTORY     Past Medical History:   Diagnosis Date    Arthritis     Emphy[facility] lung ([facility]     Full dentures     Hearing aid worn     bilateral    Hypertension     On home oxygen therapy     Pulmonary fibrosis ([facility] [address] [facility]     Sleep apnea     uses CPAP         SURGICAL HISTORY     Past Surgical History:   Procedure Laterality Date    ABDOMINAL AORTIC ANEURYSM REPAIR  08/2009    [place]    CARPAL TUNNEL RELEASE Right [id number]    CATARACT REMOVAL Bilateral 2003    FINGER AMPUTATION Right 1934    Traumatic right ring    SKIN CANCER EXCISION  06/2019    basil on right side of face, melanoma on the left upper arm.     VENTRAL HERNIA REPAIR  08/2009         CURRENTMEDICATIONS       Previous Medications    ALBUTEROL (PROVENTIL) (2.5 MG/3ML) 0.083% NEBULIZER SOLUTION    Take 3 mLs by nebulization every 6 hours as needed for Wheezing DX J44.9, J43.2, J84.10    ALBUTEROL SULFATE HFA (PROAIR HFA) 108 (90 BASE) MCG/ACT INHALER    Inhale 2 puffs into the lungs daily as needed for Shortness of Breath    ALENDRONATE (FOSAMAX) 70 MG TABLET    Take 1 tablet by mouth every 7 days    ALLOPURINOL (ZYLOPRIM) 100 MG TABLET    TAKE 1 TABLET DAILY    ARFORMOTEROL TARTRATE (BROVANA) 15 MCG/2ML NEBU Take 1 ampule by nebulization 2 times daily DX:COPD J44.9    ASCORBIC ACID (VITAMIN C) 500 MG TABLET    Take 500 mg by mouth daily. ATORVASTATIN (LIPITOR) 20 MG TABLET    Take 1 tablet by mouth daily    BLOOD GLUCOSE TEST STRIPS (ONETOUCH VERIO) STRIP    USE TO TEST DAILY    BUDESONIDE (PULMICORT) 0.5 MG/2ML NEBULIZER SUSPENSION    Take 2 mLs by nebulization 2 times daily DX:COPD J44.9    BUSPIRONE (BUSPAR) 5 MG TABLET    Take 1 tablet by mouth 3 times daily    CALCIUM CARBONATE 600 MG TABS TABLET    Take 1 tablet by mouth daily     CLOPIDOGREL (PLAVIX) 75 MG TABLET    Take 1 tablet by mouth daily    CYANOCOBALAMIN (VITAMIN B 12 PO)    Take 500 mcg by mouth daily     DILTIAZEM (CARDIZEM CD) 120 MG EXTENDED RELEASE CAPSULE    Take 1 capsule by mouth daily    ESCITALOPRAM (LEXAPRO) 5 MG TABLET    Take 1 tablet by mouth daily    FUROSEMIDE (LASIX) 20 MG TABLET    Take 1 tablet by mouth daily    METOPROLOL TARTRATE (LOPRESSOR) 25 MG TABLET    Take 1 tablet by mouth 2 times daily    ONEUCH DELICA LANCETS 37X MISC    Use to check blood sugar once daily. E11.9    PREDNISONE (DELTASONE) 20 MG TABLET    Take 20 mg by mouth daily    TIOTROPIUM (SPIRIVA HANDIHALER) 18 MCG INHALATION CAPSULE    Inhale 1 capsule into the lungs daily    VITAMIN D, ERGOCALCIFEROL, PO    Take 5,000 Units by mouth Daily     ZINC 50 MG TABS    Take 50 mg by mouth daily. ALLERGIES     Aspirin and Dilaudid [hydromorphone hcl]    FAMILYHISTORY       Family History   Problem Relation Age of Onset    Early Death Mother 43        ?  MI    Alcohol Abuse Father     Colon Cancer Sister     Emphysema Brother         black lung    Colon Cancer Sister     Cancer Brother         stomach    Cancer Brother         liver    Lung Cancer Brother     Colon Cancer Brother     Kidney Disease Brother     Other Brother         sydenham chorea          SOCIAL HISTORY       Social History     Tobacco Use    Smoking status: Former Smoker Packs/day: 2.00     Years: 50.00     Pack years: 100.00     Types: Cigarettes     Quit date: 2001     Years since quittin.3    Smokeless tobacco: Never Used   Substance Use Topics    Alcohol use: Yes     Alcohol/week: 8.0 standard drinks     Types: 8 Cans of beer per week     Frequency: 4 or more times a week     Drinks per session: 5 or 6     Binge frequency: Weekly     Comment: 8 drinks per week    Drug use: No       SCREENINGS             PHYSICAL EXAM    (up to 7 for level 4, 8 or more for level 5)     ED Triage Vitals [21 1933]   BP Temp Temp Source Pulse Resp SpO2 Height Weight   138/69 97.9 °F (36.6 °C) Oral 95 30 (!) 89 % 5' 5\" (1.651 m) 188 lb (85.3 kg)       Physical Exam  Vitals signs and nursing note reviewed. Constitutional:       Appearance: He is well-developed. He is not diaphoretic. HENT:      Head: Normocephalic and atraumatic. Right Ear: External ear normal.      Left Ear: External ear normal.      Nose: Nose normal.   Eyes:      General:         Right eye: No discharge. Left eye: No discharge. Neck:      Musculoskeletal: Normal range of motion and neck supple. Trachea: No tracheal deviation. Cardiovascular:      Rate and Rhythm: Normal rate and regular rhythm. Heart sounds: No murmur. Comments: 1+ edema to bilateral lower leg  Pulmonary:      Effort: Pulmonary effort is normal. Tachypnea present. No respiratory distress. Breath sounds: Wheezing and rales present. Comments: Diminished aeration throughout all lung fields, tachypneic, end expiratory wheezing, with rales and rhonchorous breath sounds to bases  Abdominal:      General: Bowel sounds are normal. There is no distension. Palpations: Abdomen is soft. Tenderness: There is no abdominal tenderness. There is no guarding. Musculoskeletal: Normal range of motion. Skin:     General: Skin is warm and dry.    Neurological:      Mental Status: He is alert and oriented to person, place, and time.    Psychiatric:         Behavior: Behavior normal.         DIAGNOSTIC RESULTS   LABS:    Labs Reviewed   BLOOD GAS, VENOUS - Abnormal; Notable for the following components:       Result Value    pH, Bj 7.295 (*)     pCO2, Bj 80.4 (*)     pO2, Bj 99.6 (*)     HCO3, Venous 39.1 (*)     Base Excess, Bj 9.6 (*)     Carboxyhemoglobin 3.2 (*)     All other components within normal limits    Narrative:     Performed at:  [facility] [address], [facility], [address]   Phone [phone]   CBC WITH AUTO DIFFERENTIAL - Abnormal; Notable for the following components:    WBC 12.8 (*)     Hemoglobin 12.8 (*)     MCHC 30.2 (*)     RDW 18.5 (*)     MPV 11.2 (*)     Neutrophils Absolute 12.0 (*)     Lymphocytes Absolute 0.3 (*)     Anisocytosis Occasional (*)     Macrocytes Occasional (*)     Microcytes Occasional (*)     Polychromasia Occasional (*)     Ovalocytes Occasional (*)     All other components within normal limits    Narrative:     Performed at:  [facility] [address], [facility], [address]   Phone [phone]   COMPREHENSIVE METABOLIC PANEL - Abnormal; Notable for the following components:    Potassium 5.3 (*)     Chloride 97 (*)     CO2 37 (*)     Glucose 286 (*)     BUN 44 (*)     GFR Non- 57 (*)     All other components within normal limits    Narrative:     Performed at:  [facility] [address], [facility], [address]   Phone [phone]   TROPONIN - Abnormal; Notable for the following components:    Troponin 0.02 (*)     All other components within normal limits    Narrative:     Performed at:  [facility] [address], [facility], [address] [facility]   Phone [phone]   APTT - Abnormal; Notable for the following components:    aPTT 23.5 (*)     All other components within normal limits    Narrative:     Performed at:  [address] [facility] Small to moderate bilateral effusions. Heart size appears enlarged. No pneumothorax. Extensive bilateral airspace opacities, which may be related to multifocal pneumonia or pulmonary edema. Small to moderate bilateral pleural effusions. Suspected cardiomegaly. PROCEDURES   Unless otherwise noted below, none     Procedures    CRITICAL CARE TIME   Critical Care  There was a high probability of life-threatening clinical deterioration in the patient's condition requiring my urgent intervention. Total critical care time with the patient was 45 minutes excluding separately reportable procedures. Critical care required due to patients acute respiratory failure with hypoxemia, requiring multiple breathing treatments, steroids, multiple reassessments, broad-spectrum work-up, BiPAP and admission to hospital      CONSULTS:  [address] and DIFFERENTIAL DIAGNOSIS/MDM:   Vitals:    Vitals:    04/18/21 2030 04/18/21 2045 04/18/21 2100 04/18/21 2115   BP: 128/66 126/62 127/67 115/62   Pulse: 94 92 91 86   Resp: (!) 32 25 24 23   Temp:       TempSrc:       SpO2: 97% 98% 98% 98%   Weight:       Height:           Patient was given the following medications:  Medications   furosemide (LASIX) injection 40 mg (has no administration in time range)   ipratropium-albuterol (DUONEB) nebulizer solution 1 ampule (1 ampule Inhalation Given 4/18/21 2014)   albuterol (PROVENTIL) nebulizer solution 5 mg (5 mg Nebulization Given 4/18/21 2014)   methylPREDNISolone sodium (SOLU-MEDROL) injection 125 mg (125 mg Intravenous Given 4/18/21 2002)           Briefly, this is an 80-year-old male who presents to the emergency department today for evaluation for shortness of breath. He has a history of pulmonary fibrosis as well as COPD/emphysema. Since Friday shortness of breath and cough has worsened.   The wife has been giving him the doxycycline as well as the prednisone as prescribed by his pulmonologist Dr. Cali Harden. When patient arrived to the ED he is 89% on nonrebreather, patient was immediately placed on BiPAP    He has diminished aeration throughout all lung fields with wheezing. He has some rhonchorous and rales to the lower lung fields. He does have 1+ edema to bilateral lower legs. Patient is tachypneic. VBG does show a pH of 7.29 likely due to respiratory acidosis. CBC shows analysis of leukocytosis of 1220 likely due to recent steroid use. CMP does show a glucose of 286, no anion gap, again could be secondary to the steroids. His troponin is 0.02 he has no chest pain, EKG is documented by my attending, please see his note for further details. His procalcitonin is normal    BNP is elevated and x-ray does show bilateral airspaces opacities pneumonia versus pulmonary edema, he does have bilateral pleural effusions so I suspect that the opacities are likely pulmonary edema. Patient will be given breathing treatments, steroids and Lasix in the ED. After being on BiPAP, patient is greatly improved, oxygenating well, tachypnea is improving. Patient is overall feeling better, he will need to be admitted for further care and evaluation, hospitalist has agreed for admission, patient is updated and agreeable with plan. Stable for admission      FINAL IMPRESSION      1. Acute respiratory failure with hypoxemia (HCC)    2. COPD exacerbation [facility])          DISPOSITION/PLAN   DISPOSITION Decision To Admit 04/18/2021 10:08:48 PM      PATIENT REFERREDTO:  No follow-up provider specified.     DISCHARGE MEDICATIONS:  New Prescriptions    No medications on file       DISCONTINUED MEDICATIONS:  Discontinued Medications    No medications on file              (Please note that portions of this note were completed with a voice recognition program.  Efforts were made to edit the dictations but occasionally words are mis-transcribed.)    Marcelino Mills PA-C (electronically signed)            Marcelino Mills PA-C  04/18/21 7220

## 2021-04-19 NOTE — PROGRESS NOTES
Pt admitted to the unit at this time in Afib with a controlled rate in the 80's to 90's, pt alert, on nonrebreather upon arriving and transferred to BiPAP.  Once transferred to BiPAP pt on 100% of 02 and saturation 100%

## 2021-04-19 NOTE — ED PROVIDER NOTES
I independently performed a history and physical on Amparo Dobbs. All diagnostic, treatment, and disposition decisions were made by myself in conjunction with the advanced practice provider. Briefly, this is a 80 y.o. male here for shortness of breath. Patient with history of COPD and pulmonary fibrosis, uses 10L O2 by HFNC at home at baseline. Spouse reports over the past 2-3 days has had increased oxygen demand and anxiety, worsened at night. On EMS arrival patient noted to have saturations in the 50's and placed on CPAP. On exam, Patient afebrile and nontoxic. No distress while on BiPAP. Heart irregularly irregular rhythm, normal rate. Lungs diminished throughout. Abdomen soft, nondistended, nontender to palpation in all quadrants. Patient somnolent, arouses to sternal rub and follows commands appropriately before falling back to sleep, moves all extremities. EKG  EKG was reviewed by emergency department physician in the absence of a cardiologist    Narrow complex irregular rhythm, rate 96, normal axis, normal QRS intervals, normal Qtc, no ST elevations, lateral ST depressions, normal t-wave morphology, impression atrial fibrillation with nonspecific ST morphology likely demand ischemia, no STEMI      Screenings   [place] Coma Scale  Eye Opening: Spontaneous  Best Verbal Response: Oriented  Best Motor Response: Obeys commands  [place] Coma Scale Score: 15        MDM    Patient afebrile and nontoxic. No distress. Transitioned on arrival to BiPAP with correction of hypoxia. EKG no STEMI, troponin minimally elevated which I suspect is secondary to hypoxia and increased demand. CXR with pulmonary edema versus multifocal pneumonia, felt less likely infectious etiology, wife does report patient has had more productive cough lately and initially levaquin ordered, however procal has returned negative and after discussion with internal medicine team will defer antibiotics.  ABG consistent with hypercapnia, patient is somnolent however arousable. I held extensive discussion with patient's spouse at bedside regarding plan of care. Spouse wishes to avoid intubation which I believe is very reasonable given expectation of very high mortality and irreversible dependence if placed on invasive ventilation. Patient to remain full code however. Will continue on BiPAP under close monitoring for now. Lasix given. Case discussed with internal medicine team and will admit for further evaluation and care. Critical Care:    I have discussed the case with the advanced practice provider. I have personally performed a history, physical exam, and my own medical decision making. I have reviewed the note and agree with the findings and plan. Upon my evaluation, this patient had a high probability of imminent or life-threatening deterioration due to acute respiratory failure, acute CHF exacerbation which required my direct attention, intervention, and personal management. The total critical care time personally spent while evaluating and treating this patient was 36 minutes exclusive of any time spent doing separately billable procedures. This includes time at the bedside, data interpretation, medication management, monitoring for potential decompensation and physician consultation. Specifics of interventions taken and potentially life-threatening diagnostic considerations are listed above in the medical decision making. Patient Referrals:  No follow-up provider specified. Discharge Medications:  Current Discharge Medication List          FINAL IMPRESSION  1. COPD exacerbation ([address]    2. Acute on chronic congestive heart failure, unspecified heart failure type ([address]    3. Acute on chronic respiratory failure with hypoxia and hypercapnia (HCC)        Blood pressure 95/65, pulse 85, temperature 97.4 °F (36.3 °C), temperature source Temporal, resp.  rate 20, height 5' 5\" (1.651 m), weight 178 lb 9.2 oz (81 kg), SpO2 97 %. For further details of Lucrecia Garvey Rinku's emergency department encounter, please see documentation by advanced practice provider, FARSHAD Brown.     Dianelys Osei DO (electronically signed)  Attending Emergency Physician       Dianelys Osei DO  04/19/21 8355

## 2021-04-19 NOTE — FLOWSHEET NOTE
Reason for Visit: Spiritual Care, Rounding on pt's unit    Scientologist/Spiritual/Philosophical belief: Pt is [facility], Spouse is Orthodox    Summary: This  self-initiated this visit w/the pt and the pt's spouse. Pt engaged briefly throughout this visit and appeared to be coping well/appropriately at this time. Per spouse, pt and spouse's kiana appear to be a positive source of coping and meaning making. Family is the pt's primary support and a good source of coping as well. Pt's wife indicated that she hopes for the pt to be able to return home to be with family soon. Pt and spouse requested on-going prayer for pt's recovery and \"to get better\" so they can return home.  prayed in accordance w/family's requests. No other needs were expressed at this time. Recommendations: Should any needs arise, please contact spiritual care services for follow-up. Electronically signed by Lei Back on 4/19/2021 at 4:46 PM     04/19/21 1640   Encounter Summary   Services provided to: Patient and family together   Referral/Consult From: Nurse;Rounding   Support System Spouse; Children;Family members   Place of [address] [place] [address]  (Spouse is Orthodox. )   Continue Visiting   (visit, support, prayer, coping approrpiately,  4/19)   Complexity of Encounter Moderate   Length of Encounter 30 minutes   Spiritual/Zoroastrian   Type Spiritual support   Assessment Calm; Approachable   Intervention Active listening;Explored feelings, thoughts, concerns;Explored coping resources;Nurtured hope;Prayer;Sustaining presence/ Ministry of presence; Discussed meaning/purpose;Discussed relationship with God;Discussed belief system/Adventism practices/kiana;Discussed illness/injury and it's impact   Outcome Connection/belonging;Engaged in conversation; Shared life review;Expressed feelings/needs/concerns;Coping;Receptive

## 2021-04-19 NOTE — PROGRESS NOTES
Hospitalist Progress Note      PCP: Octavia Hsieh MD    Date of Admission: 4/18/2021    Chief Complaint: Shortness of breath    Hospital Course:      80 y.o. male with history of severe COPD, pulmonary fibrosis, chronic respiratory failure with hypoxia and hypercapnia (10 L/min supplemental oxygen at baseline), severe pulmonary hypertension, obstructive sleep apnea, chronic diastolic CHF, chronic steroid use (prednisone 20 mg daily), obesity with BMI of 31 kg/m², who was recently evaluated by primary care physician within the past 2 weeks for ongoing cough, shortness of breath, sputum production, recently placed on steroid taper and doxycycline, who presents to the emergency room with complaints of worsening cough, shortness of breath, over the last 2 days. He does not have fever or chills. He has had progressive worsening of shortness of breath over the last 6 months. He was noted to be saturating 87% on 11 L via nasal cannula, transitioned to 100% supplemental oxygen via nonrebreather and had to be transitioned to BiPAP in the emergency room. Of note, patient has been vaccinated for COVID-19.      Subjective:   Currently on BiPAP with FiO2 55%  proBNP on admission 2458  Has been worsening in the last 1 to 2 weeks  His baseline is 10 L/min nasal cannula of oxygen  Pulmonary fibrosis and pulmonary hypertension  PCP has been discussing hospice option with family, wife  Currently on Lasix and BiPAP pending echo  Had multiple doses of doxycycline and prednisone recently with PCP and pulmonary outpatient    Medications:  Reviewed    Infusion Medications    dextrose      sodium chloride      furosemide (LASIX) 1mg/ml infusion 5 mg/hr (04/19/21 1022)     Scheduled Medications    ipratropium-albuterol  1 ampule Inhalation Q4H WA    allopurinol  100 mg Oral Daily    Arformoterol Tartrate  15 mcg Nebulization BID    atorvastatin  20 mg Oral Nightly    budesonide  500 mcg Nebulization BID    busPIRone 5 mg Oral TID    clopidogrel  75 mg Oral Daily    dilTIAZem  120 mg Oral Daily    escitalopram  5 mg Oral Daily    metoprolol tartrate  25 mg Oral BID    insulin glargine  0.15 Units/kg Subcutaneous Nightly    insulin lispro  0-6 Units Subcutaneous TID WC    insulin lispro  0-3 Units Subcutaneous Nightly    insulin lispro  0.05 Units/kg Subcutaneous TID     sodium chloride flush  5-40 mL Intravenous 2 times per day    enoxaparin  40 mg Subcutaneous Daily    methylPREDNISolone  40 mg Intravenous Q6H     PRN Meds: albuterol sulfate HFA, glucose, dextrose, glucagon (rDNA), dextrose, sodium chloride flush, sodium chloride, promethazine **OR** ondansetron, polyethylene glycol, acetaminophen **OR** acetaminophen      Intake/Output Summary (Last 24 hours) at 4/19/2021 1232  Last data filed at 4/19/2021 0500  Gross per 24 hour   Intake --   Output 580 ml   Net -580 ml       Physical Exam Performed:    /63   Pulse 94   Temp 97.4 °F (36.3 °C) (Temporal)   Resp 23   Ht 5' 5\" (1.651 m)   Wt 178 lb 9.2 oz (81 kg)   SpO2 95%   BMI 29.72 kg/m²     General appearance: Currently on BiPAP, in no acute respiratory distress  HEENT: Pupils equal, round, and reactive to light. Conjunctivae/corneas clear. Neck: Supple, with full range of motion. No jugular venous distention. Trachea midline. Respiratory:  Normal respiratory effort. Clear to auscultation, bilaterally without Rales/Wheezes/Rhonchi. Cardiovascular: Regular rate and rhythm with normal S1/S2 without murmurs, rubs or gallops. Abdomen: Soft, non-tender, non-distended with normal bowel sounds. Musculoskeletal: No clubbing, cyanosis or edema bilaterally. Full range of motion without deformity. Skin: Skin color, texture, turgor normal.  No rashes or lesions. Neurologic:  Neurovascularly intact without any focal sensory/motor deficits.  Cranial nerves: II-XII intact, grossly non-focal.  Psychiatric: Alert   Capillary Refill: Brisk,3 seconds, normal hyperkalemia  Chronic respiratory failure with 10 L/min supplemental oxygen at baseline  Obstructive sleep apnea      Continue ICU care  Pulmonary consult  Patient had a long history of pulmonary fibrosis and severe pulmonary hypertension  We will obtain palliative care to discuss goals of care and CODE STATUS  He has been on prednisone and doxycycline outpatient for the last 2 weeks  Pulmonary has started Solu-Medrol and start prednisone. There is no leukocytosis and procalcitonin is normal  No indication for antibiotic    Has been on Lasix IV and changed to Lasix drip 5 mg/h, urine output 580 mL overnight and his proBNP 2458. I discussed CODE STATUS with his wife, he is a full code, will obtain palliative care, patient wife agrees not to intubate at this time. Is currently on BiPAP with good saturation. Troponinemia, echocardiogram is pending, troponin with non-ACS trend    DVT Prophylaxis: Lovenox  Diet: DIET LOW SODIUM 2 GM; Carb Control: 4 carb choices (60 gms)/meal; 2000 ml  Code Status: Full Code    PT/OT Eval Status: When appropriate    Dispo -continue ICU care    Due to the immediate potential for life-threatening deterioration due to acute hypoxic respiratory failure on chronic respiratory failure, pulmonary fibrosis and severe pulmonary hypertension, I spent 35 minutes providing critical care. This time is excluding time spent performing procedures.       Han Castañeda MD

## 2021-04-19 NOTE — PLAN OF CARE
Problem: Falls - Risk of:  Goal: Will remain free from falls  Description: Will remain free from falls  4/19/2021 0819 by Verner Hoh, APRN - CNP  Outcome: Ongoing  4/19/2021 0325 by Shahzad Acuña RN  Outcome: Ongoing  Goal: Absence of physical injury  Description: Absence of physical injury  4/19/2021 0819 by Verner Hoh, APRN - CNP  Outcome: Ongoing  4/19/2021 0325 by Shahzad Acuña RN  Outcome: Ongoing     Problem: OXYGENATION/RESPIRATORY FUNCTION  Goal: Patient will maintain patent airway  4/19/2021 0819 by Verner Hoh, APRN - CNP  Outcome: Ongoing  4/19/2021 0325 by Shahzad Acuña RN  Outcome: Ongoing  Goal: Patient will achieve/maintain normal respiratory rate/effort  Description: Respiratory rate and effort will be within normal limits for the patient  4/19/2021 0819 by Verner Hoh, APRN - CNP  Outcome: Ongoing  4/19/2021 0325 by Shahzad Acuña RN  Outcome: Ongoing     Problem: HEMODYNAMIC STATUS  Goal: Patient has stable vital signs and fluid balance  4/19/2021 0819 by Verner Hoh, APRN - CNP  Outcome: Ongoing  4/19/2021 0325 by Shahzad Acuña RN  Outcome: Ongoing     Problem: FLUID AND ELECTROLYTE IMBALANCE  Goal: Fluid and electrolyte balance are achieved/maintained  4/19/2021 0819 by Verner Hoh, APRN - CNP  Outcome: Ongoing  4/19/2021 0325 by Shahzad Acuña RN  Outcome: Ongoing     Problem: ACTIVITY INTOLERANCE/IMPAIRED MOBILITY  Goal: Mobility/activity is maintained at optimum level for patient  4/19/2021 0819 by Verner Hoh, APRN - CNP  Outcome: Ongoing  4/19/2021 0325 by Shahzad Acuña RN  Outcome: Ongoing     Problem: Skin Integrity:  Goal: Will show no infection signs and symptoms  Description: Will show no infection signs and symptoms  4/19/2021 0819 by Verner Hoh, APRN - CNP  Outcome: Ongoing  4/19/2021 0325 by Shahzad Acuña RN  Outcome: Ongoing  Goal: Absence of new skin breakdown  Description: Absence of new skin breakdown  4/19/2021 0819 by Alejandrina Adair

## 2021-04-19 NOTE — H&P
Hospital Medicine History and Physical    4/18/2021    Date of Admission: 4/18/2021    Date of Service: Pt seen/examined on 4/18/2021 and admitted to inpatient. Assessment/plan:  1. Acute on chronic respiratory failure with hypercapnia. Etiology is multifactorial, includes COPD and CHF exacerbation, underlying pulmonary fibrosis and severe pulmonary hypertension. Received IV Solu-Medrol in the emergency room. Will continue management of CHF and COPD exacerbation. Will obtain repeat blood gas now. Continue BiPAP with plan to wean supplemental oxygen as tolerated. Consult pulmonology to assist with management. Will admit to ICU for close monitoring. 2. Acute on chronic diastolic CHF. Continue IV Lasix. Maintain on fluid restrictions, monitor volume status closely. Check echocardiogram.  3. COPD exacerbation. Complicated by underlying pulmonary fibrosis. Received IV Solu-Medrol in the emergency room. Will increase home dose of prednisone from 20 mg daily to 40 mg daily starting tomorrow. Continue breathing treatments. 4. Uncontrolled diabetes type 2 with hyperglycemia. Check hemoglobin A1c. Current glucose elevation complicated by steroid use. Start scheduled on prandial Humalog, sliding scale insulin. Monitor Accu-Chek closely and adjust insulin dose as needed. 5. Nonzero troponin of 0.02. Likely secondary to multiple underlying medical conditions as noted above. Will obtain serial troponin. Monitor on telemetry. Check an echocardiogram to assess wall motion for abnormalities. Presentation is not consistent with ACS. 6. Atrial fibrillation, currently with controlled rate. Patient has known history of atrial fibrillation, per wife. Checking TSH. Continue beta-blocker and Cardizem. He is not on chronic anticoagulation at baseline. 7. Hyperkalemia, mild. Expect improvement with IV Lasix. Monitor potassium level closely.   8. Other comorbidities: history of severe COPD, pulmonary fibrosis, chronic respiratory failure with hypoxia and hypercapnia (10 L/min supplemental oxygen at baseline), severe pulmonary hypertension, obstructive sleep apnea, chronic diastolic CHF, chronic steroid use (prednisone 20 mg daily), paroxysmal atrial fibrillation, obesity with BMI of 31 kg/m². Activities: Up with assist  Prophylaxis: Subcutaneous Lovenox  Code status: Full code. Discussed with wife about CODE STATUS. She currently does not want to make the decision, even though she informed me that hospice has been recommended by patient's pulmonologist. Because she could not provide an answer, she understands the patient will be made full code until such time she decides to change her mind.    ==========================================================  Chief complaint:  Chief Complaint   Patient presents with    Shortness of Breath     Presented from home via EMS with shortness of breath. Found on 11L NC satting 87%, currently on 100% NRB. Pt history of COPD and Pulm. fibrosis. History of Presenting Illness: This is a pleasant 80 y.o. male with history of severe COPD, pulmonary fibrosis, chronic respiratory failure with hypoxia and hypercapnia (10 L/min supplemental oxygen at baseline), severe pulmonary hypertension, obstructive sleep apnea, chronic diastolic CHF, chronic steroid use (prednisone 20 mg daily), obesity with BMI of 31 kg/m², who was recently evaluated by primary care physician within the past 2 weeks for ongoing cough, shortness of breath, sputum production, recently placed on steroid taper and doxycycline, who presents to the emergency room with complaints of worsening cough, shortness of breath, over the last 2 days. He does not have fever or chills. He has had progressive worsening of shortness of breath over the last 6 months.   He was noted to be saturating 87% on 11 L via nasal cannula, transitioned to 100% supplemental oxygen via nonrebreather and had to be transitioned mouth 3 times daily 1/28/21   Belinda Koo MD   allopurinol (ZYLOPRIM) 100 MG tablet TAKE 1 TABLET DAILY 1/28/21   Belinda Koo MD   metoprolol tartrate (LOPRESSOR) 25 MG tablet Take 1 tablet by mouth 2 times daily 1/28/21   Tyler Trevino MD   dilTIAZem [facility] CD) 120 MG extended release capsule Take 1 capsule by mouth daily 1/28/21   Tyler Trevino MD   budesonide (PULMICORT) 0.5 MG/2ML nebulizer suspension Take 2 mLs by nebulization 2 times daily DX:COPD J44.9 12/21/20 12/21/21  Darryle Innocent, MD   Arformoterol Tartrate (BROVANA) 15 MCG/2ML NEBU Take 1 ampule by nebulization 2 times daily DX:COPD J44.9 12/21/20   Darryle Innocent, MD   albuterol (PROVENTIL) (2.5 MG/3ML) 0.083% nebulizer solution Take 3 mLs by nebulization every 6 hours as needed for Wheezing DX J44.9, J43.2, J84.10 10/7/20   Belinda Koo MD   predniSONE (DELTASONE) 20 MG tablet Take 20 mg by mouth daily    Historical Provider, MD   albuterol sulfate HFA (PROAIR HFA) 108 (90 Base) MCG/ACT inhaler Inhale 2 puffs into the lungs daily as needed for Shortness of Breath 1/14/20   Belinda Koo MD   calcium carbonate 600 MG TABS tablet Take 1 tablet by mouth daily     Historical Provider, MD   Zinc 50 MG TABS Take 50 mg by mouth daily. Historical Provider, MD   Ascorbic Acid (VITAMIN C) 500 MG tablet Take 500 mg by mouth daily. Historical Provider, MD   VITAMIN D, ERGOCALCIFEROL, PO Take 5,000 Units by mouth Daily     Historical Provider, MD   Cyanocobalamin (VITAMIN B 12 PO) Take 500 mcg by mouth daily     Historical Provider, MD       Allergy(ies):  Aspirin and Dilaudid [hydromorphone hcl]    Social History:  TOBACCO:  reports that he quit smoking about 20 years ago. His smoking use included cigarettes. He has a 100.00 pack-year smoking history. He has never used smokeless tobacco.  ETOH:  reports current alcohol use of about 8.0 standard drinks of alcohol per week.     Family History:      Problem Relation Age of Onset    Early Death Mother 43        ? MI    Alcohol Abuse Father     Colon Cancer Sister     Emphysema Brother         black lung    Colon Cancer Sister     Cancer Brother         stomach    Cancer Brother         liver    Lung Cancer Brother     Colon Cancer Brother     Kidney Disease Brother     Other Brother         sydenham chorea       Review of Systems:  Unable to obtain as patient is somnolent. Vitals and physical examination:  /62   Pulse 86   Temp 97.9 °F (36.6 °C) (Oral)   Resp 23   Ht 5' 5\" (1.651 m)   Wt 188 lb (85.3 kg)   SpO2 98%   BMI 31.28 kg/m²   Gen/overall appearance: Not in acute distress. Somnolent but awakens to verbal command. Head: Normocephalic, atraumatic  Eyes: EOMI, good acuity  ENT: Oral mucosa moist  Neck: No JVD, thyromegaly  CVS: Nml S1S2, no MRG, RRR  Pulm: Fine bibasilar crackles. Gastrointestinal: Soft, NT/ND, +BS  Musculoskeletal: Bilateral lower extremity edema. Warm  Neuro: No focal deficit. Moves extremity spontaneously. Psychiatry: Appropriate affect. Not agitated. Skin: Warm, dry with normal turgor. No rash  Capillary refill: Brisk,< 3 seconds   Peripheral Pulses: +2 palpable, equal bilaterally       Labs/imaging/EKG:  CBC:   Recent Labs     04/18/21 1955   WBC 12.8*   HGB 12.8*        BMP:    Recent Labs     04/18/21 1955      K 5.3*   CL 97*   CO2 37*   BUN 44*   CREATININE 1.2   GLUCOSE 286*     Hepatic:   Recent Labs     04/18/21 1955   AST 17   ALT 14   BILITOT 0.5   ALKPHOS 101       Xr Chest Portable    Result Date: 4/18/2021  EXAMINATION: ONE XRAY VIEW OF THE CHEST 4/18/2021 8:10 pm COMPARISON: 07/22/2017 HISTORY: ORDERING SYSTEM PROVIDED HISTORY: SOB TECHNOLOGIST PROVIDED HISTORY: Reason for exam:->SOB Reason for Exam: sob Acuity: Unknown Type of Exam: Unknown FINDINGS: Extensive bilateral airspace opacities. Small to moderate bilateral effusions. Heart size appears enlarged. No pneumothorax.      Extensive

## 2021-04-19 NOTE — CONSULTS
[facility] Pulmonary and Critical Care   Consult Note      Reason for Consult: Acute on chronic hypoxemic and hypercapnic respiratory failure, congestive heart failure  Requesting Physician: Dr Kimberly Mckeon:   [facility] in [place]:                The patient is a 80 y.o. male with significant past medical history of:      Diagnosis Date    Arthritis     Emphysema of lung ([facility] [address]     Full dentures     Hearing aid worn     bilateral    Hypertension     On home oxygen therapy     Pulmonary fibrosis ([facility] [address]     Sleep apnea     uses CPAP     Patient presented to the hospital with a history of increasingly severe shortness of breath and peripheral edema over the preceding 1 weeks duration. He had mild associated cough. Modifying factor for him is a history of severe COPD and centrilobular emphysema. The patient also has pulmonary fibrosis. He has a history of diastolic heart failure followed by cardiology, Dr. Francie Lu.      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     VENTRAL HERNIA REPAIR  08/2009     Current Medications:    Current Facility-Administered Medications: albuterol sulfate  (90 Base) MCG/ACT inhaler 2 puff, 2 puff, Inhalation, Daily PRN  ipratropium-albuterol (DUONEB) nebulizer solution 1 ampule, 1 ampule, Inhalation, Q4H WA  allopurinol (ZYLOPRIM) tablet 100 mg, 100 mg, Oral, Daily  Arformoterol Tartrate (BROVANA) nebulizer solution 15 mcg, 15 mcg, Nebulization, BID  atorvastatin (LIPITOR) tablet 20 mg, 20 mg, Oral, Nightly  budesonide (PULMICORT) nebulizer suspension 500 mcg, 500 mcg, Nebulization, BID  busPIRone (BUSPAR) tablet 5 mg, 5 mg, Oral, TID  clopidogrel (PLAVIX) tablet 75 mg, 75 mg, Oral, Daily  dilTIAZem (CARDIZEM CD) extended release capsule 120 mg, 120 mg, Oral, Daily  escitalopram (LEXAPRO) tablet 5 mg, 5 mg, Oral, Daily  metoprolol tartrate (LOPRESSOR) tablet 25 mg, 25 mg, Oral, BID  insulin glargine (LANTUS;BASAGLAR) injection pen 13 Units, 0.15 Units/kg, Subcutaneous, Nightly  insulin lispro (1 Unit Dial) 0-6 Units, 0-6 Units, Subcutaneous, TID WC  insulin lispro (1 Unit Dial) 0-3 Units, 0-3 Units, Subcutaneous, Nightly  glucose (GLUTOSE) 40 % oral gel 15 g, 15 g, Oral, PRN  dextrose 50 % IV solution, 12.5 g, Intravenous, PRN  glucagon (rDNA) injection 1 mg, 1 mg, Intramuscular, PRN  dextrose 5 % solution, 100 mL/hr, Intravenous, PRN  insulin lispro (1 Unit Dial) 4 Units, 0.05 Units/kg, Subcutaneous, TID WC  sodium chloride flush 0.9 % injection 5-40 mL, 5-40 mL, Intravenous, 2 times per day  sodium chloride flush 0.9 % injection 10 mL, 10 mL, Intravenous, PRN  0.9 % sodium chloride infusion, 25 mL, Intravenous, PRN  promethazine (PHENERGAN) tablet 12.5 mg, 12.5 mg, Oral, Q6H PRN **OR** ondansetron (ZOFRAN) injection 4 mg, 4 mg, Intravenous, Q6H PRN  polyethylene glycol (GLYCOLAX) packet 17 g, 17 g, Oral, Daily PRN  acetaminophen (TYLENOL) tablet 650 mg, 650 mg, Oral, Q6H PRN **OR** acetaminophen (TYLENOL) suppository 650 mg, 650 mg, Rectal, Q6H PRN  enoxaparin (LOVENOX) injection 40 mg, 40 mg, Subcutaneous, Daily  furosemide (LASIX) 100 mg in dextrose 5 % 100 mL infusion, 5 mg/hr, Intravenous, Continuous  methylPREDNISolone sodium (SOLU-MEDROL) injection 40 mg, 40 mg, Intravenous, Q6H    Allergies   Allergen Reactions    Aspirin Hives and Swelling    Dilaudid [Hydromorphone Hcl] Other (See Comments)     Severe hallucination       Social History:    TOBACCO:   reports that he quit smoking about 20 years ago. His smoking use included cigarettes. He has a 100.00 pack-year smoking history.  He has never used smokeless tobacco.  ETOH:   reports current alcohol use of about 8.0 standard drinks of alcohol per week. Patient currently lives independently  Environmental/chemical exposure: None known    Family History:       Problem Relation Age of Onset    Early Death Mother 43        ? MI    Alcohol Abuse Father     Colon Cancer Sister     Emphysema Brother         black lung    Colon Cancer Sister     Cancer Brother         stomach    Cancer Brother         liver    Lung Cancer Brother     Colon Cancer Brother     Kidney Disease Brother     Other Brother         sydenham chorea     REVIEW OF SYSTEMS:    CONSTITUTIONAL:  negative for fevers, chills, diaphoresis, activity change, appetite change, fatigue, night sweats and unexpected weight change. EYES:  negative for blurred vision, eye discharge, visual disturbance and icterus  HEENT:  negative for hearing loss, tinnitus, ear drainage, sinus pressure, nasal congestion, epistaxis and snoring  RESPIRATORY:  See HPI  CARDIOVASCULAR:  negative for chest pain, palpitations, exertional chest pressure/discomfort, edema, syncope  GASTROINTESTINAL:  negative for nausea, vomiting, diarrhea, constipation, blood in stool and abdominal pain  GENITOURINARY:  negative for frequency, dysuria, urinary incontinence, decreased urine volume, and hematuria  HEMATOLOGIC/LYMPHATIC:  negative for easy bruising, bleeding and lymphadenopathy  ALLERGIC/IMMUNOLOGIC:  negative for recurrent infections, angioedema, anaphylaxis and drug reactions  ENDOCRINE:  negative for weight changes and diabetic symptoms including polyuria, polydipsia and polyphagia  MUSCULOSKELETAL:  negative for  pain, joint swelling, decreased range of motion and muscle weakness  NEUROLOGICAL:  negative for headaches, slurred speech, unilateral weakness  PSYCHIATRIC/BEHAVIORAL: negative for hallucinations, behavioral problems, confusion and agitation.      Objective:   PHYSICAL EXAM:      VITALS:  /63   Pulse 94   Temp 97.4 °F (36.3 °C) (Temporal)   Resp (!) 31   Ht 5' 5\" (1.651 m)   Wt 178 lb 9.2 oz (81 kg)   SpO2 95%   BMI 29.72 kg/m²      24HR INTAKE/OUTPUT:      Intake/Output Summary (Last 24 hours) at 4/19/2021 1143  Last data filed at 4/19/2021 0500  Gross per 24 hour   Intake --   Output 580 ml   Net -580 ml     CONSTITUTIONAL:  awake, alert, cooperative, no apparent distress, and appears stated age  NECK:  Supple, symmetrical, trachea midline, no adenopathy, thyroid symmetric, not enlarged and no tenderness, skin normal  LUNGS:  no increased work of breathing and basilar crackles to auscultation. No accessory muscle use. He has a congested cough  CARDIOVASCULAR: S1 and S2, no edema and no JVD  ABDOMEN:  normal bowel sounds, non-distended and no masses palpated, and no tenderness to palpation. No hepatospleenomegaly  LYMPHADENOPATHY:  no axillary or supraclavicular adenopathy. No cervical adnenopathy  PSYCHIATRIC: Oriented to person place and time. No obvious depression or anxiety. MUSCULOSKELETAL: No obvious misalignment or effusion of the joints. No clubbing, cyanosis of the digits. SKIN:  normal skin color, texture, turgor and no redness, warmth, or swelling.  No palpable nodules    DATA:    Old records have been reviewed    CBC:  Recent Labs     04/18/21 1955 04/19/21  0245   WBC 12.8* 9.7   RBC 4.86 4.53   HGB 12.8* 12.1*   HCT 42.4 38.9*    119*   MCV 87.4 85.9   MCH 26.4 26.7   MCHC 30.2* 31.1   RDW 18.5* 18.1*      BMP:  Recent Labs     04/18/21 1955 04/19/21  0245    143   K 5.3* 4.7   CL 97* 99   CO2 37* 37*   BUN 44* 43*   CREATININE 1.2 1.1   CALCIUM 9.5 8.8   GLUCOSE 286* 245*      ABG:  Recent Labs     04/19/21  0004   PHART 7.343*   CSV0NAJ 74.6*   PO2ART 176.0*   QXR1MYH 40.5*   R8XZNMVJ 99.8   BEART 11.8*     Procalcitonin  Recent Labs     04/18/21 1955   PROCAL 0.12       No results found for: BNP  Lab Results   Component Value Date    TROPONINI <0.01 04/19/2021           Radiology Review:  All pertinent images / reports were reviewed as a part of this visit. Assessment:     1. Acute on chronic hypoxemic and hypercapnic respiratory failure  2. Acute pulmonary edema  3. Severe COPD/centrilobular emphysema  4. Pulmonary fibrosis  5. Acute on chronic diastolic heart failure      Plan:     I have reviewed laboratories, medical records and images for this visit  CT imaging reveals evidence of apical predominant centrilobular emphysema and pulmonary fibrosis. Now also with evidence of vascular congestion and bilateral, small pleural effusions. I think this is most likely decompensated heart failure. There is no leukocytosis and procalcitonin is normal.  Doubt pulmonary infection though that would be an alternative explanation for the appearance of the CT scan  At the moment not on antibiotic therapy  I will change him to Lasix at 5 mg/h. He has not diuresed much with IV push Lasix  Continue inhaled therapy  Make his steroids IV  Did discuss with his wife CODE STATUS. Moment he is a full code but I doubt that she would want him to be intubated. He does remain in respiratory failure. Was placed on nasal cannula oxygen when I was in the room and failed that. Now back on BiPAP support with saturations in the low 90s. Total critical care time caring for this patient with life threatening illness, including direct patient contact, management of life support systems, review of data including imaging and labs, discussions with other team members and physicians is at least 32 minutes so far today, excluding procedures.

## 2021-04-19 NOTE — CONSULTS
Comprehensive Nutrition Assessment    Type and Reason for Visit:  Initial    Nutrition Recommendations/Plan:   Monitor PO intake over next 24 - 48 hr as appropriate with respiratory status vs alternative nutrition   Chocolate Ensure Enlive BID as tolerated      Nutrition Assessment:  Consult received for heart failure diet education. Not appropriate at this time for education, admitted with COPD exacerbation. Pt evaluated during critical care team rounds. Spoke with pts wife regarding nutrition status at home. Pt typically eats well and consumes about two meals per day. About 2 weeks ago his appetite dropped off and his po intake decreased. He normally has a couple of beers in the evening and that has even stopped according to his wife. At home he is on a normal consistency diet, but tries to follow aspiration precautions, takes meds with pudding and tries to avoid mixed consistencies. He consumes 1 Ensure High Protein every other day with Miralax. Weight has been stable overall, he lost ~7lb two weeks ago d/t lasix. PO diet is ordered however pt is not eating d/t bipap. Will follow for PO intake as appropriate/safe vs nutrition support. Malnutrition Assessment:  Malnutrition Status:  No malnutrition        Estimated Daily Nutrient Needs:  Energy (kcal):  2025 - 2430; Weight Used for Energy Requirements:  Current(81kg)     Protein (g):  97 - 162; Weight Used for Protein Requirements:  Current(1.2 - 2.0g/81kg)        Fluid (ml/day):  2000    Nutrition Related Findings:  active BS; gluc 262      Wounds:  None       Current Nutrition Therapies:    DIET LOW SODIUM 2 GM; Carb Control: 4 carb choices (60 gms)/meal; 2000 ml    Anthropometric Measures:  · Height: 5' 5\" (165.1 cm)  · Current Body Weight: 178 lb (80.7 kg)   · Usual Body Weight: 188 lb (85.3 kg)     · Ideal Body Weight: 136 lbs; % Ideal Body Weight 130.9 %   · BMI: 29.6  · BMI Categories: Overweight (BMI 25.0-29. 9)       Nutrition Diagnosis:

## 2021-04-19 NOTE — PROGRESS NOTES
4 Eyes Skin Assessment     The patient is being assess for   Admission    I agree that 2 RN's have performed a thorough Head to Toe Skin Assessment on the patient. ALL assessment sites listed below have been assessed. Areas assessed by both nurses:   [x]   Head, Face, and Ears   [x]   Shoulders, Back, and Chest, Abdomen  []   Arms, Elbows, and Hands   [x]   Coccyx, Sacrum, and Ischium  [x]   Legs, Feet, and Heels      Co-signer eSignature: Electronically signed by Jaylan Olguin RN on 4/19/21 at 6:26 AM EDT    Does the Patient have Skin Breakdown?   No          Tr Prevention initiated:  Yes   Wound Care Orders initiated:  No      WOC nurse consulted for Pressure Injury (Stage 3,4, Unstageable, DTI, NWPT, Complex wounds)and New or Established Ostomies:  No      Primary Nurse eSignature: Electronically signed by Leigh Ann Padron RN on 4/19/21 at 3:28 AM EDT

## 2021-04-20 NOTE — PROGRESS NOTES
Hospitalist Progress Note      PCP: Lencho Alvarado MD    Date of Admission: 4/18/2021    Chief Complaint: Shortness of breath    Hospital Course:      80 y.o. male with history of severe COPD, pulmonary fibrosis, chronic respiratory failure with hypoxia and hypercapnia (10 L/min supplemental oxygen at baseline), severe pulmonary hypertension, obstructive sleep apnea, chronic diastolic CHF, chronic steroid use (prednisone 20 mg daily), obesity with BMI of 31 kg/m², who was recently evaluated by primary care physician within the past 2 weeks for ongoing cough, shortness of breath, sputum production, recently placed on steroid taper and doxycycline, who presents to the emergency room with complaints of worsening cough, shortness of breath, over the last 2 days. He does not have fever or chills. He has had progressive worsening of shortness of breath over the last 6 months. He was noted to be saturating 87% on 11 L via nasal cannula, transitioned to 100% supplemental oxygen via nonrebreather and had to be transitioned to BiPAP in the emergency room. Of note, patient has been vaccinated for COVID-19.      Subjective:   Currently on BiPAP with FiO2 55%  proBNP on admission 2458  Has been worsening in the last 1 to 2 weeks  His baseline is 10 L/min nasal cannula of oxygen  Pulmonary fibrosis and pulmonary hypertension  PCP has been discussing hospice option with family, wife  Currently on Lasix and BiPAP pending echo  Had multiple doses of doxycycline and prednisone recently with PCP and pulmonary outpatient    Medications:  Reviewed    Infusion Medications    dextrose      sodium chloride 25 mL (04/20/21 1538)    furosemide (LASIX) 1mg/ml infusion 5 mg/hr (04/20/21 1543)     Scheduled Medications    insulin lispro  0-12 Units Subcutaneous TID WC    insulin lispro  0-6 Units Subcutaneous Nightly    magnesium sulfate  1,000 mg Intravenous Q1H    ipratropium-albuterol  1 ampule Inhalation Q4H WA    allopurinol  100 mg Oral Daily    Arformoterol Tartrate  15 mcg Nebulization BID    atorvastatin  20 mg Oral Nightly    budesonide  500 mcg Nebulization BID    busPIRone  5 mg Oral TID    clopidogrel  75 mg Oral Daily    dilTIAZem  120 mg Oral Daily    escitalopram  5 mg Oral Daily    metoprolol tartrate  25 mg Oral BID    insulin glargine  0.15 Units/kg Subcutaneous Nightly    insulin lispro  0.05 Units/kg Subcutaneous TID     sodium chloride flush  5-40 mL Intravenous 2 times per day    enoxaparin  40 mg Subcutaneous Daily    methylPREDNISolone  40 mg Intravenous Q6H     PRN Meds: potassium chloride **OR** potassium alternative oral replacement **OR** potassium chloride, albuterol sulfate HFA, glucose, dextrose, glucagon (rDNA), dextrose, sodium chloride flush, sodium chloride, promethazine **OR** ondansetron, polyethylene glycol, acetaminophen **OR** acetaminophen      Intake/Output Summary (Last 24 hours) at 4/20/2021 1552  Last data filed at 4/20/2021 1543  Gross per 24 hour   Intake 399.37 ml   Output 1440 ml   Net -1040.63 ml       Physical Exam Performed:    /66   Pulse 101   Temp 97.7 °F (36.5 °C) (Temporal)   Resp (!) 31   Ht 5' 5\" (1.651 m)   Wt 174 lb 6.1 oz (79.1 kg)   SpO2 92%   BMI 29.02 kg/m²     General appearance: Currently on BiPAP, in no acute respiratory distress  HEENT: Pupils equal, round, and reactive to light. Conjunctivae/corneas clear. Neck: Supple, with full range of motion. No jugular venous distention. Trachea midline. Respiratory:  Normal respiratory effort. Clear to auscultation, bilaterally without Rales/Wheezes/Rhonchi. Cardiovascular: Regular rate and rhythm with normal S1/S2 without murmurs, rubs or gallops. Abdomen: Soft, non-tender, non-distended with normal bowel sounds. Musculoskeletal: No clubbing, cyanosis or edema bilaterally. Full range of motion without deformity.   Skin: Skin color, texture, turgor normal.  No rashes or lesions. Neurologic:  Neurovascularly intact without any focal sensory/motor deficits. Cranial nerves: II-XII intact, grossly non-focal.  Psychiatric: Alert   Capillary Refill: Brisk,3 seconds, normal   Peripheral Pulses: +2 palpable, equal bilaterally       Labs:   Recent Labs     04/18/21 1955 04/19/21 0245 04/20/21  0545   WBC 12.8* 9.7 11.5*   HGB 12.8* 12.1* 11.4*   HCT 42.4 38.9* 37.1*    119* 122*     Recent Labs     04/18/21 1955 04/19/21 0245 04/20/21  0545    143 146*   K 5.3* 4.7 3.9   CL 97* 99 99   CO2 37* 37* 39*   BUN 44* 43* 47*   CREATININE 1.2 1.1 1.1   CALCIUM 9.5 8.8 8.7   PHOS  --  4.7 3.8     Recent Labs     04/18/21 1955 04/19/21 0245 04/20/21  0545   AST 17 15 10*   ALT 14 12 9*   BILITOT 0.5 0.4 0.6   ALKPHOS 101 91 81     Recent Labs     04/18/21 1955   INR 1.11     Recent Labs     04/18/21 1955 04/19/21 0245 04/19/21  0444   TROPONINI 0.02* <0.01 <0.01       Urinalysis:      Lab Results   Component Value Date    NITRU Negative 04/19/2021    WBCUA 1 04/19/2021    BACTERIA 1+ 07/22/2017    RBCUA 1 04/19/2021    BLOODU Negative 04/19/2021    SPECGRAV 1.017 04/19/2021    GLUCOSEU Negative 04/19/2021       Radiology:  XR CHEST PORTABLE   Final Result   Persistent pulmonary edema pattern with asymmetric airspace disease in the   lung bases greater on the left with pleural effusion. This is superimposed   upon interstitial lung disease and emphysema as best demonstrated on recent   CT. CT CHEST WO CONTRAST   Final Result   Bilateral lower lobe airspace disease suspicious for pneumonia, with small   right pleural effusion. This is occurring on a background of pulmonary   emphysema and pulmonary fibrosis with chronic left pleural thickening.    Interval mediastinal lymph node enlargement is likely reactive but could be   reassessed with chest CT when the patient is clinically baseline         XR CHEST PORTABLE   Final Result   Extensive bilateral airspace opacities, ICU  Level of care    Due to the immediate potential for life-threatening deterioration due to acute hypoxic respiratory failure on chronic respiratory failure, pulmonary fibrosis and severe pulmonary hypertension, I spent 35 minutes providing critical care. This time is excluding time spent performing procedures.       Fausto Cheng MD

## 2021-04-20 NOTE — CONSULTS
[facility]  [phone]      Chief Complaint   Patient presents with    Shortness of Breath     Presented from home via EMS with shortness of breath. Found on 11L NC satting 87%, currently on 100% NRB. Pt history of COPD and Pulm. fibrosis. History of Present Illness:  Jessica Flores is a 80 y.o. patient who presented to the hospital with complaints of shortness of breath and cough. He has not been in the hospital or ER in the last year. He chronically wears oxygen on 9L. He has been treated for lung disease for the last 7 years. He is followed by Dr. Jono Araujo in cardiology for SVT, NSVT, and atrial fibrillation. He has been having worsening shortness of breath, sputum production, and cough. The symptoms were present at rest and improved somewhat with rest. Symptoms were severe. He was given antibiotics, steroids, and furosemide by his PCP, but did not improve. He was admitted to [facility] for further evaluation. His wife provides most of the history and states that their goal is to get to a family celebration in June. Past Medical History:   has a past medical history of Arthritis, Emphysema of lung ([address] Full dentures, Hearing aid worn, Hypertension, On home oxygen therapy, Pulmonary fibrosis ([address] and Sleep apnea. 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 Social History:   reports that he quit smoking about 20 years ago. His smoking use included cigarettes. He has a 100.00 pack-year smoking history. He has never used smokeless tobacco. He reports current alcohol use of about 8.0 standard drinks of alcohol per week. He reports that he does not use drugs.      Family History:  No family history of premature coronary artery disease, aortic disease, or valve disease. Home Medications:  Were reviewed and are listed in nursing record. and/or listed below  Prior to Admission medications    Medication Sig Start Date End Date Taking? Authorizing Provider   busPIRone (BUSPAR) 5 MG tablet Take 1 tablet by mouth 3 times daily 1/28/21  Yes Alirio Mancuso MD   albuterol (PROVENTIL) (2.5 MG/3ML) 0.083% nebulizer solution Take 3 mLs by nebulization every 6 hours as needed for Wheezing DX J44.9, J43.2, J84.10 10/7/20  Yes Alirio Mancuso MD   furosemide (LASIX) 20 MG tablet Take 1 tablet by mouth daily 4/5/21   Alirio Mancuso MD   blood glucose test strips [facility]) strip USE TO TEST DAILY 3/23/21   Alirio Mancuso MD   OneTouch Delica Lancets 41M MISC Use to check blood sugar once daily.  E11.9 3/23/21   Alirio Mancuso MD   atorvastatin (LIPITOR) 20 MG tablet Take 1 tablet by mouth daily 1/29/21   Brianda Guadarrama MD   clopidogrel (PLAVIX) 75 MG tablet Take 1 tablet by mouth daily 1/28/21   Alirio Mancuso MD   alendronate (FOSAMAX) 70 MG tablet Take 1 tablet by mouth every 7 days 1/28/21   Alirio Mancuso MD   tiotropium (Buzz Lean) 18 MCG inhalation capsule Inhale 1 capsule into the lungs daily 1/28/21   Alirio Mancuso MD   escitalopram (LEXAPRO) 5 MG tablet Take 1 tablet by mouth daily 1/28/21   Alirio Mancuso MD   allopurinol (ZYLOPRIM) 100 MG tablet TAKE 1 TABLET DAILY 1/28/21   Alirio Mancuso MD   metoprolol tartrate (LOPRESSOR) 25 MG tablet Take 1 tablet by mouth 2 times daily 1/28/21   Brianda Guadarrama MD   dilTIAZem (CARDIZEM CD) 120 MG extended release capsule Take 1 capsule by mouth daily 1/28/21   Brianda Guadarrama MD   budesonide (PULMICORT) 0.5 MG/2ML nebulizer suspension Take 2 mLs by nebulization 2 times daily DX:COPD J44.9 12/21/20 12/21/21  Savanah Gilbert MD   Arformoterol Tartrate [facility]) 15 MCG/2ML NEBU Take 1 ampule by nebulization 2 times daily DX:COPD J44.9 12/21/20   Savanah Gilbert MD predniSONE (DELTASONE) 20 MG tablet Take 20 mg by mouth daily    Historical Provider, MD   albuterol sulfate HFA (PROAIR HFA) 108 (90 Base) MCG/ACT inhaler Inhale 2 puffs into the lungs daily as needed for Shortness of Breath 1/14/20   Remberto Magallanes MD   calcium carbonate 600 MG TABS tablet Take 1 tablet by mouth daily     Historical Provider, MD   Zinc 50 MG TABS Take 50 mg by mouth daily. Historical Provider, MD   Ascorbic Acid (VITAMIN C) 500 MG tablet Take 500 mg by mouth daily.       Historical Provider, MD   VITAMIN D, ERGOCALCIFEROL, PO Take 5,000 Units by mouth Daily     Historical Provider, MD   Cyanocobalamin (VITAMIN B 12 PO) Take 500 mcg by mouth daily     Historical Provider, MD        Current Medications:  Current Facility-Administered Medications   Medication Dose Route Frequency Provider Last Rate Last Admin    insulin lispro (1 Unit Dial) 0-12 Units  0-12 Units Subcutaneous TID  Per Oquendo MD        insulin lispro (1 Unit Dial) 0-6 Units  0-6 Units Subcutaneous Nightly Per Oquendo MD        potassium chloride (KLOR-CON M) extended release tablet 40 mEq  40 mEq Oral PRN Lynnette Vela MD        Or    potassium bicarb-citric acid (EFFER-K) effervescent tablet 40 mEq  40 mEq Oral PRN Lynnette Vela MD        Or    potassium chloride 10 mEq/100 mL IVPB (Peripheral Line)  10 mEq Intravenous PRN Lynnette Vela MD        albuterol sulfate  (90 Base) MCG/ACT inhaler 2 puff  2 puff Inhalation Daily PRN Candace Morales MD        ipratropium-albuterol (DUONEB) nebulizer solution 1 ampule  1 ampule Inhalation Q4H WA Candace Morales MD   1 ampule at 04/20/21 0919    allopurinol (ZYLOPRIM) tablet 100 mg  100 mg Oral Daily Candace Morales MD   100 mg at 04/20/21 0803    Arformoterol Tartrate (BROVANA) nebulizer solution 15 mcg  15 mcg Nebulization BID Candace Morales MD   15 mcg at 04/20/21 0919    atorvastatin (LIPITOR) tablet 20 mg  20 mg Oral Nightly Juan Mathias MD   20 mg at 04/19/21 2108    budesonide (PULMICORT) nebulizer suspension 500 mcg  500 mcg Nebulization BID Juan Mathias MD   500 mcg at 04/20/21 0919    busPIRone (BUSPAR) tablet 5 mg  5 mg Oral TID Juan Mathias MD   5 mg at 04/20/21 0804    clopidogrel (PLAVIX) tablet 75 mg  75 mg Oral Daily Juan Mathias MD   75 mg at 04/20/21 0803    dilTIAZem (CARDIZEM CD) extended release capsule 120 mg  120 mg Oral Daily Juan Mathias MD   120 mg at 04/20/21 0803    escitalopram (LEXAPRO) tablet 5 mg  5 mg Oral Daily Juan Mathias MD   5 mg at 04/20/21 0803    metoprolol tartrate (LOPRESSOR) tablet 25 mg  25 mg Oral BID Juan Mathias MD   25 mg at 04/20/21 0804    insulin glargine (LANTUS;BASAGLAR) injection pen 13 Units  0.15 Units/kg Subcutaneous Nightly Juan Mathias MD   13 Units at 04/19/21 2117    glucose (GLUTOSE) 40 % oral gel 15 g  15 g Oral PRN Juan Mathias MD        dextrose 50 % IV solution  12.5 g Intravenous PRN Juan Mathias MD        glucagon (rDNA) injection 1 mg  1 mg Intramuscular PRN Juan Mathias MD        dextrose 5 % solution  100 mL/hr Intravenous PRN Juan Mathias MD        insulin lispro (1 Unit Dial) 4 Units  0.05 Units/kg Subcutaneous TID WC Juan Mathias MD   4 Units at 04/20/21 0806    sodium chloride flush 0.9 % injection 5-40 mL  5-40 mL Intravenous 2 times per day Juan Mathias MD   10 mL at 04/19/21 2109    sodium chloride flush 0.9 % injection 10 mL  10 mL Intravenous PRN Krysta Hobbs MD        0.9 % sodium chloride infusion  25 mL Intravenous PRN Juan Mathias MD        promethazine (PHENERGAN) tablet 12.5 mg  12.5 mg Oral Q6H PRN Juan Mathias MD        Or    ondansetron (ZOFRAN) injection 4 mg  4 mg Intravenous Q6H PRN uJan Mathias MD        polyethylene glycol (GLYCOLAX) packet 17 g  17 g Oral Daily PRN Juan Mathias MD        acetaminophen (TYLENOL) tablet 650 mg  650 mg Oral Q6H PRN Renee Sibley MD        Or    acetaminophen (TYLENOL) suppository 650 mg  650 mg Rectal Q6H PRN Reene Sibley MD        enoxaparin (LOVENOX) injection 40 mg  40 mg Subcutaneous Daily Renee Sibley MD   40 mg at 04/20/21 0804    furosemide (LASIX) 100 mg in dextrose 5 % 100 mL infusion  5 mg/hr Intravenous Continuous Juan Maloney MD 5 mL/hr at 04/20/21 0926 5 mg/hr at 04/20/21 0926    methylPREDNISolone sodium (SOLU-MEDROL) injection 40 mg  40 mg Intravenous Q6H Juan Maloney MD   40 mg at 04/20/21 0449        Allergies:  Aspirin and Dilaudid [hydromorphone hcl]     Review of Systems:     · Constitutional: weight loss +  · Eyes: No visual changes or diplopia. No scleral icterus. · ENT: No Headaches, hearing loss or vertigo. No mouth sores or sore throat. · Cardiovascular: Reviewed in HPI, + arrhythmia, no chest pain   · Respiratory: +cough, + shortness of breath   · Gastrointestinal: No abdominal pain, appetite loss, blood in stools. No change in bowel or bladder habits. · Genitourinary: No dysuria, trouble voiding, or hematuria. · Musculoskeletal:  No gait disturbance, weakness or joint complaints. · Integumentary: No rash or pruritis. · Neurological: No headache, diplopia, change in muscle strength, numbness or tingling. No change in gait, balance, coordination, mood, affect, memory, mentation, behavior. · Psychiatric: No anxiety, no depression. · Endocrine: No malaise, fatigue or temperature intolerance. No excessive thirst, fluid intake, or urination. No tremor. · Hematologic/Lymphatic: +easy bruising  · Allergic/Immunologic: No nasal congestion or hives.   ·     Physical Examination:    Vitals:    04/20/21 0920   BP:    Pulse:    Resp: (!) 31   Temp:    SpO2:     Weight: 174 lb 6.1 oz (79.1 kg)         General Appearance:  Alert, cooperative, chronically ill appearing   Head:  Normocephalic, without obvious abnormality, atraumatic   Eyes:  PERRL, conjunctiva/corneas clear       Nose: Nares normal, no drainage or sinus tenderness   Throat: Lips, mucosa, and tongue normal   Neck: Supple, symmetrical, trachea midline, no adenopathy, thyroid: not enlarged, symmetric, no tenderness/mass/nodules, no carotid bruit or JVD       Lungs:   crackles, respirations unlabored   Chest Wall:  No tenderness or deformity   Heart:  Irregularly irregular, 2/6 systolic murmur   Abdomen:   Soft, non-tender, bowel sounds active all four quadrants,  no masses, no organomegaly           Extremities: +edema   Pulses: 1+ and symmetric   Skin: Scattered ecchymosis   Pysch: Normal mood and affect   Neurologic: Normal gross motor and sensory exam.         Labs  CBC:   Lab Results   Component Value Date    WBC 11.5 04/20/2021    RBC 4.34 04/20/2021    HGB 11.4 04/20/2021    HCT 37.1 04/20/2021    MCV 85.5 04/20/2021    RDW 18.5 04/20/2021     04/20/2021     CMP:    Lab Results   Component Value Date     04/20/2021    K 3.9 04/20/2021    K 4.8 01/29/2020    CL 99 04/20/2021    CO2 39 04/20/2021    BUN 47 04/20/2021    CREATININE 1.1 04/20/2021    GFRAA >60 04/20/2021    AGRATIO 1.1 04/20/2021    LABGLOM >60 04/20/2021    LABGLOM 68 11/26/2018    GLUCOSE 202 04/20/2021    PROT 5.9 04/20/2021    CALCIUM 8.7 04/20/2021    BILITOT 0.6 04/20/2021    ALKPHOS 81 04/20/2021    AST 10 04/20/2021    ALT 9 04/20/2021     PT/INR:  No results found for: PTINR  Lab Results   Component Value Date    TROPONINI <0.01 04/19/2021       EKG:  I have reviewed EKG with the following interpretation:  Impression:  Atrial fibrillation     Echo: -Normal left ventricle size, wall thickness, and systolic function with an   estimated ejection fraction of 55-60%. -No regional wall motion abnormalities are seen. -Severe biatrial enlargement noted.    -There is mild-to-moderate mitral regurgitation.   -There is severe tricuspid regurgitation.   -Diastolic dysfunction is present however, indeterminate pulmonary     3. Atrial arrhythmias  Plan:  - continue betablocker   - ekg to evaluate for atrial fibrillation     4. Severe pulmonary hypertension  - likely predominantly Group III and Group II  Plan:  - not a candidate for RHC at this time as he cannot tolerate sedation     5. History of aspiration      All questions and concerns were addressed to the patient/family. Alternatives to my treatment were discussed. The note was completed using EMR. Every effort was made to ensure accuracy; however, inadvertent computerized transcription errors may be present.   Jim Hansen MD 4/20/2021 9:38 AM

## 2021-04-20 NOTE — PROGRESS NOTES
Daily  Arformoterol Tartrate (BROVANA) nebulizer solution 15 mcg, 15 mcg, Nebulization, BID  atorvastatin (LIPITOR) tablet 20 mg, 20 mg, Oral, Nightly  budesonide (PULMICORT) nebulizer suspension 500 mcg, 500 mcg, Nebulization, BID  busPIRone (BUSPAR) tablet 5 mg, 5 mg, Oral, TID  clopidogrel (PLAVIX) tablet 75 mg, 75 mg, Oral, Daily  dilTIAZem (CARDIZEM CD) extended release capsule 120 mg, 120 mg, Oral, Daily  escitalopram (LEXAPRO) tablet 5 mg, 5 mg, Oral, Daily  metoprolol tartrate (LOPRESSOR) tablet 25 mg, 25 mg, Oral, BID  insulin glargine (LANTUS;BASAGLAR) injection pen 13 Units, 0.15 Units/kg, Subcutaneous, Nightly  glucose (GLUTOSE) 40 % oral gel 15 g, 15 g, Oral, PRN  dextrose 50 % IV solution, 12.5 g, Intravenous, PRN  glucagon (rDNA) injection 1 mg, 1 mg, Intramuscular, PRN  dextrose 5 % solution, 100 mL/hr, Intravenous, PRN  insulin lispro (1 Unit Dial) 4 Units, 0.05 Units/kg, Subcutaneous, TID WC  sodium chloride flush 0.9 % injection 5-40 mL, 5-40 mL, Intravenous, 2 times per day  sodium chloride flush 0.9 % injection 10 mL, 10 mL, Intravenous, PRN  0.9 % sodium chloride infusion, 25 mL, Intravenous, PRN  promethazine (PHENERGAN) tablet 12.5 mg, 12.5 mg, Oral, Q6H PRN **OR** ondansetron (ZOFRAN) injection 4 mg, 4 mg, Intravenous, Q6H PRN  polyethylene glycol (GLYCOLAX) packet 17 g, 17 g, Oral, Daily PRN  acetaminophen (TYLENOL) tablet 650 mg, 650 mg, Oral, Q6H PRN **OR** acetaminophen (TYLENOL) suppository 650 mg, 650 mg, Rectal, Q6H PRN  enoxaparin (LOVENOX) injection 40 mg, 40 mg, Subcutaneous, Daily  furosemide (LASIX) 100 mg in dextrose 5 % 100 mL infusion, 5 mg/hr, Intravenous, Continuous  methylPREDNISolone sodium (SOLU-MEDROL) injection 40 mg, 40 mg, Intravenous, Q6H    Allergies   Allergen Reactions    Aspirin Hives and Swelling    Dilaudid [Hydromorphone Hcl] Other (See Comments)     Severe hallucination       Social History:    TOBACCO:   reports that he quit smoking about 20 years ago.  His smoking use included cigarettes. He has a 100.00 pack-year smoking history. He has never used smokeless tobacco.  ETOH:   reports current alcohol use of about 8.0 standard drinks of alcohol per week. Patient currently lives independently  Environmental/chemical exposure: None known    Family History:       Problem Relation Age of Onset    Early Death Mother 43        ? MI    Alcohol Abuse Father     Colon Cancer Sister     Emphysema Brother         black lung    Colon Cancer Sister     Cancer Brother         stomach    Cancer Brother         liver    Lung Cancer Brother     Colon Cancer Brother     Kidney Disease Brother     Other Brother         sydenham chorea     REVIEW OF SYSTEMS:    CONSTITUTIONAL:  negative for fevers, chills, diaphoresis, activity change, appetite change, fatigue, night sweats and unexpected weight change.    EYES:  negative for blurred vision, eye discharge, visual disturbance and icterus  HEENT:  negative for hearing loss, tinnitus, ear drainage, sinus pressure, nasal congestion, epistaxis and snoring  RESPIRATORY:  See HPI  CARDIOVASCULAR:  negative for chest pain, palpitations, exertional chest pressure/discomfort, edema, syncope  GASTROINTESTINAL:  negative for nausea, vomiting, diarrhea, constipation, blood in stool and abdominal pain  GENITOURINARY:  negative for frequency, dysuria, urinary incontinence, decreased urine volume, and hematuria  HEMATOLOGIC/LYMPHATIC:  negative for easy bruising, bleeding and lymphadenopathy  ALLERGIC/IMMUNOLOGIC:  negative for recurrent infections, angioedema, anaphylaxis and drug reactions  ENDOCRINE:  negative for weight changes and diabetic symptoms including polyuria, polydipsia and polyphagia  MUSCULOSKELETAL:  negative for  pain, joint swelling, decreased range of motion and muscle weakness  NEUROLOGICAL:  negative for headaches, slurred speech, unilateral weakness  PSYCHIATRIC/BEHAVIORAL: negative for hallucinations, behavioral problems, confusion and agitation. Objective:   PHYSICAL EXAM:      VITALS:  BP (!) 106/59   Pulse 86   Temp 97.2 °F (36.2 °C) (Temporal)   Resp (!) 31   Ht 5' 5\" (1.651 m)   Wt 174 lb 6.1 oz (79.1 kg)   SpO2 91%   BMI 29.02 kg/m²      24HR INTAKE/OUTPUT:      Intake/Output Summary (Last 24 hours) at 4/20/2021 0932  Last data filed at 4/20/2021 6757  Gross per 24 hour   Intake 366.24 ml   Output 1550 ml   Net -1183.76 ml     CONSTITUTIONAL:  awake, alert, cooperative, no apparent distress, and appears stated age  NECK:  Supple, symmetrical, trachea midline, no adenopathy, thyroid symmetric, not enlarged and no tenderness, skin normal  LUNGS:  no increased work of breathing and basilar crackles to auscultation. No accessory muscle use. He has a congested cough  CARDIOVASCULAR: S1 and S2, no edema and no JVD  ABDOMEN:  normal bowel sounds, non-distended and no masses palpated, and no tenderness to palpation. No hepatospleenomegaly  LYMPHADENOPATHY:  no axillary or supraclavicular adenopathy. No cervical adnenopathy  PSYCHIATRIC: Oriented to person place and time. No obvious depression or anxiety. MUSCULOSKELETAL: No obvious misalignment or effusion of the joints. No clubbing, cyanosis of the digits. SKIN:  normal skin color, texture, turgor and no redness, warmth, or swelling.  No palpable nodules    DATA:    Old records have been reviewed    CBC:  Recent Labs     04/18/21 1955 04/19/21 0245 04/20/21  0545   WBC 12.8* 9.7 11.5*   RBC 4.86 4.53 4.34   HGB 12.8* 12.1* 11.4*   HCT 42.4 38.9* 37.1*    119* 122*   MCV 87.4 85.9 85.5   MCH 26.4 26.7 26.2   MCHC 30.2* 31.1 30.7*   RDW 18.5* 18.1* 18.5*      BMP:  Recent Labs     04/18/21 1955 04/19/21 0245 04/20/21  0545    143 146*   K 5.3* 4.7 3.9   CL 97* 99 99   CO2 37* 37* 39*   BUN 44* 43* 47*   CREATININE 1.2 1.1 1.1   CALCIUM 9.5 8.8 8.7   GLUCOSE 286* 245* 202*      ABG:  Recent Labs     04/19/21  0004   PHART 7.343*   OSA1GRY 74.6*

## 2021-04-20 NOTE — PLAN OF CARE
Pt will remain free of falls by asking the staff for assistance before ambulation, using the call light, and wearing  socks

## 2021-04-20 NOTE — CONSULTS
Palliative Care:   80 y. o. male with history of severe COPD, pulmonary fibrosis, chronic respiratory failure with hypoxia and hypercapnia (10 L/min supplemental oxygen at baseline), severe pulmonary hypertension, obstructive sleep apnea, chronic diastolic CHF, chronic steroid use (prednisone 20 mg daily), obesity with BMI of 31 kg/m², who was recently evaluated by primary care physician within the past 2 weeks for ongoing cough, shortness of breath, sputum production, recently placed on steroid taper and doxycycline, who presents to the emergency room with complaints of worsening cough, shortness of breath, over the last 2 days. [facility] does not have fever or chills.  He has had progressive worsening of shortness of breath over the last 6 months.  He was noted to be saturating 87% on 11 L via nasal cannula, transitioned to 100% supplemental oxygen via nonrebreather and had to be transitioned to BiPAP in the emergency room. Patient admitted 4/18/21 and Palliative consult ordered.      CBC:        Recent Labs     04/19/21  0245 04/20/21  0545 04/21/21  0420   WBC 9.7 11.5* 10.1   RBC 4.53 4.34 4.34   HGB 12.1* 11.4* 11.6*   HCT 38.9* 37.1* 36.9*   * 122* 111*   MCV 85.9 85.5 84.9   MCH 26.7 26.2 26.6   MCHC 31.1 30.7* 31.3   RDW 18.1* 18.5* 18.1*      BMP:        Recent Labs     04/19/21  0245 04/20/21  0545 04/21/21  0420    146* 145   K 4.7 3.9 3.9   CL 99 99 99   CO2 37* 39* 39*   BUN 43* 47* 64*   CREATININE 1.1 1.1 1.5*   CALCIUM 8.8 8.7 8.4   GLUCOSE 245* 202* 187*      ABG:      Recent Labs     04/19/21  0004   PHART 7.343*   [id number] 74.6*   PO2ART 176.0*   RJI7PIF 40.5*   [id number] 99.8   BEART 11.8*      Procalcitonin       Recent Labs     04/18/21  1955 04/20/21  0545   PROCAL 0.12 0.10        Past Medical History:   has a past medical history of Arthritis, Emphysema of lung ([address] Full dentures, Hearing aid worn, Hypertension, On home oxygen therapy, Pulmonary fibrosis ([address] and Sleep apnea. 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 Advance Directives:   DNR-CCA      Problem Severity: Pain/Other Symptoms:   SOB, ongoing cough. Difficulty eating at times. Intermittently requires BiPAP. Overall edematous. Bed Mobility/Toileting/Transfer: Full assist.   Creatine elevating, currently @ 1.5   Lasix infusing. Performance Status:        Palliative Performance Scale:  100% []Full Normal activity & work No evidence of disease  90%   [] Full Normal activity & work Some evidence of disease  80%   [] Full Normal activity with Effort Some evidence of disease  70%   [] Reduced Unable Normal Job/Work Significant disease Full Normal or reduced  60%   [] Ambulation reduced; Significant disease; Can't do hobbies/housework; intake normal   or reduced; occasional assist; LOC full/confusion  50%   [] Mainly sit/lie; Extensive disease; Can't do any work; Considerable assist; intake normal  Or reduced; LOC full/confusion  40%   [] Mainly in bed; Extensive disease; Mainly assist; intake normal or reduced; occasional assist; LOC full/confusion  30%   [x] Bed Bound; Extensive disease; Total care; intake reduced; LOC full/confusion  20%   [] Bed Bound; Extensive disease; Total care; intake minimal; Drowsy/coma  10%   [] Bed Bound; Extensive disease; Total care; Mouth care only; Drowsy/coma    PPS 30%  Symptom Assessment: Appetite/Nausea/Bowels/Fatigue:      lbs. Albumin 3.1    Social History:   reports that he quit smoking about 20 years ago. His smoking use included cigarettes. He has a 100.00 pack-year smoking history. He has never used smokeless tobacco. He reports current alcohol use of about 8.0 standard drinks of alcohol per week.  He reports that he does not use drugs. Family History:  family history includes Alcohol Abuse in his father; Cancer in his brother and brother; August Fudge in his brother, sister, and sister; Early Death (age of onset: 43) in his mother; Emphysema in his brother; Kidney Disease in his brother; Starlette Gear in his brother; Other in his brother. Psychological/Spiritual:    . Two children. Islam. Wife is Hinduism       Family Discussion:    Wife stays at bedside. Patient A/O X3. Able to converse yet at times becomes SOB with any exertion. Discussed ACP/Code status. No ACP in place and patient declines to complete. States his wife (present) is DPOA when he can not communicate. Code status is changed as of today to [facility]. Both patient and wife understand this level of care. Discussed potential DC scenarios that could include Palliative or Hospice to follow. Education and brochure of this information provided. Both patient and wife agree to discuss further the options discussed and allow Palliative to follow up in AM.     Discussed Scientology. Agreed to allow 's follow during in-patient and have done so. Patient is war  served in [address] [place] [address]. Patient was thanked for his services. AGUSTÍN Barcenas notified of above conversations. Nurse Naomi Garrido aware of today's discussions. Will continue to follow for emotional/educational support.

## 2021-04-20 NOTE — PROGRESS NOTES
Physical/Occupational Therapy  Amparo Dobbs    Attempted to see pt for PT/OT evaluation. Patient currently on BiPap due to respiratory status. Will hold therapy at this time and will follow up with pt tomorrow as medically appropriate.  Thanks, Lady Harrell, PT, DPT [id number], Jim Dad, OTR/L [id number]

## 2021-04-21 NOTE — PROGRESS NOTES
Wiggins catheter removed d/t leaking. New 18 Fr wiggins placed. Urine return noted. Will continue to monitor for leaking.

## 2021-04-21 NOTE — PROGRESS NOTES
Physical/Occupational Therapy  Amparo Dobbs    Attempted to see pt for PT/OT evaluation. Patient not medically appropriate for therapy evaluations due to respiratory status. Will hold therapy at this time and will follow up with pt as medically appropriate.    Thanks, Frankey Rinks, PT, DPT [id number]  Tor Palm, OTR/L #[id number]

## 2021-04-21 NOTE — CONSULTS
[address]  Advanced CHF/Pulmonary Hypertension   Cardiac Evaluation      Estela Sykes  YOB: 1932    Requesting PHysician:  Dr. King Ray      Chief Complaint   Patient presents with    Shortness of Breath     Presented from home via EMS with shortness of breath. Found on 11L NC satting 87%, currently on 100% NRB. Pt history of COPD and Pulm. fibrosis. History of Present Illness:  George Roman is an 79 yo male who presents to the ED for evaluation of shortness of breath. She has a history of COPD/emphysema, history of pulmonary fibrosis. He is followed by Dr. Luis Gould. He came to the ED with increasing cough and increasing shortness of breath. His wife is at bedside. The cough has rossana productive of thick mucus. He was started on doxycycline but this didn't help. He has been on prednisone for some time. The patient's shortness of breath was worse over the past 3 days. Denies chest pain. No fever or chills. No abdominal pain, nausea, vomiting, or diarrhea. No urinary symptoms. He has received COVID vaccine. He has had some lower leg edema. He has not gained any weight. He has a history of diastolic HF followed by Dr. Steff Watkins for SVT, NSVT, and atrial fibrillation. Snadi Ingram He chronically wears oxygen at 9 liters. His symptoms were present at rest.  The patient's goals are to get to a family celebration in June. We are consulted for pulmonary hypertension and fluid overload. Labs:  Sodium 145  K 3.9  BUN/Cre 64/1.5  Albumin 3.1  H/H 11.6/36.9  WBC 10.1  CXR:  4/20/21:  Impression   Persistent pulmonary edema pattern with asymmetric airspace disease in the   lung bases greater on the left with pleural effusion.  This is superimposed   upon interstitial lung disease and emphysema as best demonstrated on recent   CT. Echo:  4/19/21:   -Normal left ventricle size, wall thickness, and systolic function with an   estimated ejection fraction of 55-60%. -No regional wall motion abnormalities are seen. -Severe biatrial enlargement noted. -There is mild-to-moderate mitral regurgitation.   -There is severe tricuspid regurgitation.   -Diastolic dysfunction is present however, indeterminate grade.   -Estimated pulmonary artery systolic pressure is severely elevated at 73   mmHg assuming a right atrial pressure of 15 mmHg.     Meds:  Lasix 20 qd  plavix 75 mg po qd  Metoprolol 25 mg po bid  diltiazem 120 mg po qd  Prednisone 20 qd    I/O's negative 2892 cc    Allergies   Allergen Reactions    Aspirin Hives and Swelling    Dilaudid [Hydromorphone Hcl] Other (See Comments)     Severe hallucination     Current Facility-Administered Medications   Medication Dose Route Frequency Provider Last Rate Last Admin    cefepime (MAXIPIME) 2000 mg IVPB minibag  2,000 mg Intravenous 2 times per day Jackey Duverney, MD   Stopped at 04/21/21 1049    [START ON 4/22/2021] furosemide (LASIX) injection 40 mg  40 mg Intravenous Daily Jackey Duverney, MD        methylPREDNISolone sodium (SOLU-MEDROL) injection 40 mg  40 mg Intravenous Q12H Jackey Duverney, MD        insulin lispro (1 Unit Dial) 0-12 Units  0-12 Units Subcutaneous TID WC Deven Odell MD   6 Units at 04/21/21 1224    insulin lispro (1 Unit Dial) 0-6 Units  0-6 Units Subcutaneous Nightly Deven Odell MD   3 Units at 04/20/21 2143    potassium chloride (KLOR-CON M) extended release tablet 40 mEq  40 mEq Oral PRN Jackey Duverney, MD        Or    potassium bicarb-citric acid (EFFER-K) effervescent tablet 40 mEq  40 mEq Oral PRN Jackey Duverney, MD        Or   [employer] potassium chloride 10 mEq/100 mL IVPB (Peripheral Line)  10 mEq Intravenous PRN Jackey Duverney, MD        albuterol sulfate  (90 Base) MCG/ACT inhaler 2 puff  2 puff Inhalation Daily PRN Addy Rosales MD        ipratropium-albuterol (DUONEB) nebulizer solution 1 ampule  1 ampule Inhalation Q4H WA Addy Rosales MD   1 ampule at 04/21/21 1316    allopurinol (ZYLOPRIM) tablet 100 mg  100 mg Oral Daily Juan Mathias MD   100 mg at 04/21/21 9444    Arformoterol Tartrate (BROVANA) nebulizer solution 15 mcg  15 mcg Nebulization BID Juan Mathias MD   15 mcg at 04/20/21 2012    atorvastatin (LIPITOR) tablet 20 mg  20 mg Oral Nightly Juan Mathias MD   20 mg at 04/20/21 2121    budesonide (PULMICORT) nebulizer suspension 500 mcg  500 mcg Nebulization BID Juan Mathias MD   500 mcg at 04/20/21 2012    busPIRone (BUSPAR) tablet 5 mg  5 mg Oral TID Juan Mathias MD   5 mg at 04/21/21 1425    clopidogrel (PLAVIX) tablet 75 mg  75 mg Oral Daily Juan Mathias MD   75 mg at 04/21/21 0905    dilTIAZem (CARDIZEM CD) extended release capsule 120 mg  120 mg Oral Daily Juan Mathias MD   120 mg at 04/21/21 0905    escitalopram (LEXAPRO) tablet 5 mg  5 mg Oral Daily Juan Mathias MD   5 mg at 04/21/21 0905    metoprolol tartrate (LOPRESSOR) tablet 25 mg  25 mg Oral BID Juan Mathias MD   25 mg at 04/21/21 0905    insulin glargine (LANTUS;BASAGLAR) injection pen 13 Units  0.15 Units/kg Subcutaneous Nightly Juan Mathias MD   13 Units at 04/20/21 2144    glucose (GLUTOSE) 40 % oral gel 15 g  15 g Oral PRN Juan Mathias MD        dextrose 50 % IV solution  12.5 g Intravenous PRN Juan Mathias MD        glucagon (rDNA) injection 1 mg  1 mg Intramuscular PRN Juan Mathias MD        dextrose 5 % solution  100 mL/hr Intravenous PRN Juan Mathias MD        insulin lispro (1 Unit Dial) 4 Units  0.05 Units/kg Subcutaneous TID WC Juan Mathias MD   4 Units at 04/21/21 1226    sodium chloride flush 0.9 % injection 5-40 mL  5-40 mL Intravenous 2 times per day Juan Mathias MD   10 mL at 04/20/21 2122    sodium chloride flush 0.9 % injection 10 mL  10 mL Intravenous PRN Krysta Pelletier MD        0.9 % sodium chloride infusion  25 mL Intravenous PRN Juan Mathias  mL/hr at 04/21/21 1003 25 mL at 04/21/21 1003    promethazine (PHENERGAN) tablet 12.5 mg  12.5 mg Oral Q6H PRN Ana Medrano MD        Or    ondansetron (ZOFRAN) injection 4 mg  4 mg Intravenous Q6H PRN Ana Medrano MD        polyethylene glycol (GLYCOLAX) packet 17 g  17 g Oral Daily PRN Ana Medrano MD        acetaminophen (TYLENOL) tablet 650 mg  650 mg Oral Q6H PRN Ana Medrano MD   650 mg at 04/20/21 2137    Or    acetaminophen (TYLENOL) suppository 650 mg  650 mg Rectal Q6H PRN Ana Medrano MD        enoxaparin (LOVENOX) injection 40 mg  40 mg Subcutaneous Daily Ana Medrano MD   40 mg at 04/21/21 8850       Past Medical History:   Diagnosis Date    Arthritis     [facility] lung [facility] [address]     Full dentures     Hearing aid worn     bilateral    Hypertension     On home oxygen therapy     Pulmonary fibrosis ([facility] [address]     Sleep apnea     uses CPAP     Past Surgical History:   Procedure Laterality Date    ABDOMINAL AORTIC ANEURYSM REPAIR  08/2009    [place]    CARPAL TUNNEL RELEASE Right [id number]    CATARACT REMOVAL Bilateral 2003    FINGER AMPUTATION Right 1934    Traumatic right ring    SKIN CANCER EXCISION  06/2019    basil on right side of face, melanoma on the left upper arm.  VENTRAL HERNIA REPAIR  08/2009     Family History   Problem Relation Age of Onset    Early Death Mother 43        ?  MI    Alcohol Abuse Father     Colon Cancer Sister     Emphysema Brother         black lung    Colon Cancer Sister     Cancer Brother         stomach    Cancer Brother         liver    Lung Cancer Brother     Colon Cancer Brother     Kidney Disease Brother     Other Brother         sydenham chorea     Social History     Socioeconomic History    Marital status:      Spouse name: Not on file    Number of children: Not on file    Years of education: Not on file    Highest education level: Not on file   Occupational History    Not on file   Social Needs    Financial resource strain: Not on file    Food insecurity     Worry: Not on file     Inability: Not on file    Transportation needs     Medical: Not on file     Non-medical: Not on file   Tobacco Use    Smoking status: Former Smoker     Packs/day: 2.00     Years: 50.00     Pack years: 100.00     Types: Cigarettes     Quit date: 2001     Years since quittin.3    Smokeless tobacco: Never Used   Substance and Sexual Activity    Alcohol use: Yes     Alcohol/week: 8.0 standard drinks     Types: 8 Cans of beer per week     Frequency: 4 or more times a week     Drinks per session: 5 or 6     Binge frequency: Weekly     Comment: 8 drinks per week    Drug use: No    Sexual activity: Not on file   Lifestyle    Physical activity     Days per week: Not on file     Minutes per session: Not on file    Stress: Not on file   Relationships    Social connections     Talks on phone: Not on file     Gets together: Not on file     Attends Advent service: Not on file     Active member of club or organization: Not on file     Attends meetings of clubs or organizations: Not on file     Relationship status: Not on file    Intimate partner violence     Fear of current or ex partner: Not on file     Emotionally abused: Not on file     Physically abused: Not on file     Forced sexual activity: Not on file   Other Topics Concern    Not on file   Social History Narrative    Not on file       Review of Systems:   · Constitutional: there has been no unanticipated weight loss. There's been no change in energy level, sleep pattern, or activity level. · Eyes: No visual changes or diplopia. No scleral icterus. · ENT: No Headaches, hearing loss or vertigo. No mouth sores or sore throat. · Cardiovascular: Reviewed in HPI  · Respiratory: No cough or wheezing, no sputum production. No hematemesis. · Gastrointestinal: No abdominal pain, appetite loss, blood in stools. No change in bowel or bladder habits.   · Genitourinary: equal  · Pedal Pulses: 2+ and equal   Neck:  · No thyromegaly  Abdomen:  · No masses or tenderness  · Liver/Spleen: No Abnormalities Noted  Neurological/Psychiatric:  · Alert and oriented in all spheres  · Moves all extremities well  · Exhibits normal gait balance and coordination  · No abnormalities of mood, affect, memory, mentation, or behavior are noted    Labs were reviewed including labs from other hospital systems through [facility]. Cardiac testing was reviewed including echos, nuclear scans, cardiac catheterization, including from other hospital systems through [facility]. Assessment:    1. COPD exacerbation ([facility] [address]    2. Chronic diastolic congestive heart failure, unspecified heart failure type ([facility] [address]    3. Acute on chronic respiratory failure with hypoxia and hypercapnia (HCC)     4.   Pulmonary hypertension due to chronic respiratory failure from pulmonary fibrosis. 5/  Severe hypoxemia    Plan:  1. The patient is chronically ill from COPD and pulmonary fibrosis  2. Discussions with the patient and wife have led the patient to decide about no intubation or CPR  3. Continue gentle diuresis. Aggressive diuresis yesterday led to increase in creatinine  4. Continue antibiotics per hospitalist  5. Continue low dose metoprolol and diltiazem for Afib and SVT  6. Continue Plavix  7. Start spironolactone 12.5 mg po qd for chronic edema and chronic HF  8. Would ideally like to start low dose ACEI or ARB for HF and pulmonary hypertension, after diuresis  9  CHF education reinforced. ~salt restriction  ~fluid restriction  ~medication compliance  ~daily weights and notify of any significant weight gain/loss  ~establish with CHF nurse  ~outpatient follow-up with our CHF team      I appreciate the opportunity of cooperating in the care of this patient.     Clemencia Dobbins M.D., [facility]

## 2021-04-21 NOTE — PROGRESS NOTES
Hospitalist Progress Note      PCP: Areli Alex MD    Date of Admission: 4/18/2021    Chief Complaint: Shortness of breath    Hospital Course:      80 y.o. male with history of severe COPD, pulmonary fibrosis, chronic respiratory failure with hypoxia and hypercapnia (10 L/min supplemental oxygen at baseline), severe pulmonary hypertension, obstructive sleep apnea, chronic diastolic CHF, chronic steroid use (prednisone 20 mg daily), obesity with BMI of 31 kg/m², who was recently evaluated by primary care physician within the past 2 weeks for ongoing cough, shortness of breath, sputum production, recently placed on steroid taper and doxycycline, who presents to the emergency room with complaints of worsening cough, shortness of breath, over the last 2 days. He does not have fever or chills. He has had progressive worsening of shortness of breath over the last 6 months. He was noted to be saturating 87% on 11 L via nasal cannula, transitioned to 100% supplemental oxygen via nonrebreather and had to be transitioned to BiPAP in the emergency room. Of note, patient has been vaccinated for COVID-19. Subjective:   Currently on BiPAP with FiO2 55%  proBNP on admission 2458  Has been worsening in the last 1 to 2 weeks  His baseline is 10 L/min nasal cannula of oxygen  Pulmonary fibrosis and pulmonary hypertension  PCP has been discussing hospice option with family, wife  Currently on Lasix and BiPAP pending echo  Had multiple doses of doxycycline and prednisone recently with PCP and pulmonary outpatient      4/21  Today he is off bipap and tolerating this well.     Medications:  Reviewed    Infusion Medications    dextrose      sodium chloride 25 mL (04/21/21 1003)     Scheduled Medications    cefepime  2,000 mg Intravenous 2 times per day    [START ON 4/22/2021] furosemide  40 mg Intravenous Daily    methylPREDNISolone  40 mg Intravenous Q12H    insulin lispro  0-12 Units Subcutaneous TID WC    insulin lispro  0-6 Units Subcutaneous Nightly    ipratropium-albuterol  1 ampule Inhalation Q4H WA    allopurinol  100 mg Oral Daily    Arformoterol Tartrate  15 mcg Nebulization BID    atorvastatin  20 mg Oral Nightly    budesonide  500 mcg Nebulization BID    busPIRone  5 mg Oral TID    clopidogrel  75 mg Oral Daily    dilTIAZem  120 mg Oral Daily    escitalopram  5 mg Oral Daily    metoprolol tartrate  25 mg Oral BID    insulin glargine  0.15 Units/kg Subcutaneous Nightly    insulin lispro  0.05 Units/kg Subcutaneous TID     sodium chloride flush  5-40 mL Intravenous 2 times per day    enoxaparin  40 mg Subcutaneous Daily     PRN Meds: potassium chloride **OR** potassium alternative oral replacement **OR** potassium chloride, albuterol sulfate HFA, glucose, dextrose, glucagon (rDNA), dextrose, sodium chloride flush, sodium chloride, promethazine **OR** ondansetron, polyethylene glycol, acetaminophen **OR** acetaminophen      Intake/Output Summary (Last 24 hours) at 4/21/2021 1602  Last data filed at 4/21/2021 1530  Gross per 24 hour   Intake 442.94 ml   Output 1605 ml   Net -1162.06 ml       Physical Exam Performed:    BP (!) 144/82   Pulse 88   Temp 98.2 °F (36.8 °C) (Temporal)   Resp (!) 33   Ht 5' 5\" (1.651 m)   Wt 174 lb 2.6 oz (79 kg)   SpO2 (!) 89%   BMI 28.98 kg/m²     General appearance: Currently on BiPAP, in no acute respiratory distress  HEENT: Pupils equal, round, and reactive to light. Conjunctivae/corneas clear. Neck: Supple, with full range of motion. No jugular venous distention. Trachea midline. Respiratory:  Normal respiratory effort. Clear to auscultation, bilaterally without Rales/Wheezes/Rhonchi. Cardiovascular: Regular rate and rhythm with normal S1/S2 without murmurs, rubs or gallops. Abdomen: Soft, non-tender, non-distended with normal bowel sounds. Musculoskeletal: No clubbing, cyanosis or edema bilaterally. Full range of motion without deformity.   Skin: Skin color, texture, turgor normal.  No rashes or lesions. Neurologic:  Neurovascularly intact without any focal sensory/motor deficits. Cranial nerves: II-XII intact, grossly non-focal.  Psychiatric: Alert, awake and following commands. Capillary Refill: Brisk,3 seconds, normal   Peripheral Pulses: +2 palpable, equal bilaterally       Labs:   Recent Labs     04/19/21 0245 04/20/21  0545 04/21/21  0420   WBC 9.7 11.5* 10.1   HGB 12.1* 11.4* 11.6*   HCT 38.9* 37.1* 36.9*   * 122* 111*     Recent Labs     04/19/21 0245 04/20/21  0545 04/21/21  0420    146* 145   K 4.7 3.9 3.9   CL 99 99 99   CO2 37* 39* 39*   BUN 43* 47* 64*   CREATININE 1.1 1.1 1.5*   CALCIUM 8.8 8.7 8.4   PHOS 4.7 3.8 4.6     Recent Labs     04/19/21 0245 04/20/21  0545 04/21/21  0420   AST 15 10* 13*   ALT 12 9* 12   BILITOT 0.4 0.6 0.6   ALKPHOS 91 81 83     Recent Labs     04/18/21 1955   INR 1.11     Recent Labs     04/18/21 1955 04/19/21 0245 04/19/21  0444   TROPONINI 0.02* <0.01 <0.01       Urinalysis:      Lab Results   Component Value Date    NITRU Negative 04/19/2021    WBCUA 1 04/19/2021    BACTERIA 1+ 07/22/2017    RBCUA 1 04/19/2021    BLOODU Negative 04/19/2021    SPECGRAV 1.017 04/19/2021    GLUCOSEU Negative 04/19/2021       Radiology:  XR CHEST PORTABLE   Final Result   Persistent pulmonary edema pattern with asymmetric airspace disease in the   lung bases greater on the left with pleural effusion. This is superimposed   upon interstitial lung disease and emphysema as best demonstrated on recent   CT. CT CHEST WO CONTRAST   Final Result   Bilateral lower lobe airspace disease suspicious for pneumonia, with small   right pleural effusion. This is occurring on a background of pulmonary   emphysema and pulmonary fibrosis with chronic left pleural thickening.    Interval mediastinal lymph node enlargement is likely reactive but could be   reassessed with chest CT when the patient is clinically baseline XR CHEST PORTABLE   Final Result   Extensive bilateral airspace opacities, which may be related to multifocal   pneumonia or pulmonary edema. Small to moderate bilateral pleural effusions. Suspected cardiomegaly. Assessment/Plan:    Active Hospital Problems    Diagnosis    Acute on chronic diastolic heart failure (HCC) [I50.33]     Acute on chronic respiratory failure with hypoxia and hypercapnia  Pulmonary fibrosis  Pulmonary hypertension  Acute on chronic diastolic congestive heart failure  History of COPD  Uncontrolled diabetes type 2 with hyperglycemia  Troponinemia  History of paroxysmal atrial fibrillation  Mild hyperkalemia  Chronic respiratory failure with 10 L/min supplemental oxygen at baseline  Obstructive sleep apnea        Pulmonary following   Patient had a long history of pulmonary fibrosis and severe pulmonary hypertension   I discussed goals of care and the goal is to make it to a family reunion at the end of June. He has been on prednisone and doxycycline outpatient for the last 2 weeks  Pulmonary has started Solu-Medrol and start prednisone. He is now a [facility]    There is no leukocytosis and procalcitonin is normal  No indication for antibiotic    Has been on Lasix IV and changed to Lasix drip 5 mg/h, urine output 580 mL overnight and his proBNP 2458. I discussed CODE STATUS with his wife,and he is now limited. I  will obtain palliative care, patient wife agrees not to intubate at this time. However we did discuss this and they might consider intubation if anyone thought he might able to come off the vent. Is currently on BiPAP with good saturation. They discussed with pulm today and are [facility]  Will meet with hospice. tmr.     Troponinemia, echocardiogram is pending, troponin with non-ACS trend    DVT Prophylaxis: Lovenox  Diet: Dietary Nutrition Supplements: Low Calorie High Protein Supplement  Dietary Nutrition Supplements: Other Oral Supplement (see comment)  DIET LOW SODIUM 2 GM; Carb Control: 5 carb choices (75 gms)/meal; Dysphagia Soft and Bite-Sized; 2000 ml; No Drinking Straw  Code Status: DNR-CCA    PT/OT Eval Status: When appropriate    Dispo -continue ICU  Level of care    Due to the immediate potential for life-threatening deterioration due to acute hypoxic respiratory failure on chronic respiratory failure, pulmonary fibrosis and severe pulmonary hypertension, I spent 35 minutes providing critical care. This time is excluding time spent performing procedures.       Fausto Cheng MD

## 2021-04-21 NOTE — PROGRESS NOTES
Inhalation, Daily PRN  ipratropium-albuterol (DUONEB) nebulizer solution 1 ampule, 1 ampule, Inhalation, Q4H WA  allopurinol (ZYLOPRIM) tablet 100 mg, 100 mg, Oral, Daily  Arformoterol Tartrate (BROVANA) nebulizer solution 15 mcg, 15 mcg, Nebulization, BID  atorvastatin (LIPITOR) tablet 20 mg, 20 mg, Oral, Nightly  budesonide (PULMICORT) nebulizer suspension 500 mcg, 500 mcg, Nebulization, BID  busPIRone (BUSPAR) tablet 5 mg, 5 mg, Oral, TID  clopidogrel (PLAVIX) tablet 75 mg, 75 mg, Oral, Daily  dilTIAZem (CARDIZEM CD) extended release capsule 120 mg, 120 mg, Oral, Daily  escitalopram (LEXAPRO) tablet 5 mg, 5 mg, Oral, Daily  metoprolol tartrate (LOPRESSOR) tablet 25 mg, 25 mg, Oral, BID  insulin glargine (LANTUS;BASAGLAR) injection pen 13 Units, 0.15 Units/kg, Subcutaneous, Nightly  glucose (GLUTOSE) 40 % oral gel 15 g, 15 g, Oral, PRN  dextrose 50 % IV solution, 12.5 g, Intravenous, PRN  glucagon (rDNA) injection 1 mg, 1 mg, Intramuscular, PRN  dextrose 5 % solution, 100 mL/hr, Intravenous, PRN  insulin lispro (1 Unit Dial) 4 Units, 0.05 Units/kg, Subcutaneous, TID WC  sodium chloride flush 0.9 % injection 5-40 mL, 5-40 mL, Intravenous, 2 times per day  sodium chloride flush 0.9 % injection 10 mL, 10 mL, Intravenous, PRN  0.9 % sodium chloride infusion, 25 mL, Intravenous, PRN  promethazine (PHENERGAN) tablet 12.5 mg, 12.5 mg, Oral, Q6H PRN **OR** ondansetron (ZOFRAN) injection 4 mg, 4 mg, Intravenous, Q6H PRN  polyethylene glycol (GLYCOLAX) packet 17 g, 17 g, Oral, Daily PRN  acetaminophen (TYLENOL) tablet 650 mg, 650 mg, Oral, Q6H PRN **OR** acetaminophen (TYLENOL) suppository 650 mg, 650 mg, Rectal, Q6H PRN  enoxaparin (LOVENOX) injection 40 mg, 40 mg, Subcutaneous, Daily    Allergies   Allergen Reactions    Aspirin Hives and Swelling    Dilaudid [Hydromorphone Hcl] Other (See Comments)     Severe hallucination       Social History:    TOBACCO:   reports that he quit smoking about 20 years ago.  His smoking confusion and agitation. Objective:   PHYSICAL EXAM:      VITALS:  /72   Pulse 86   Temp 97.8 °F (36.6 °C) (Temporal)   Resp 26   Ht 5' 5\" (1.651 m)   Wt 174 lb 2.6 oz (79 kg)   SpO2 92%   BMI 28.98 kg/m²      24HR INTAKE/OUTPUT:      Intake/Output Summary (Last 24 hours) at 4/21/2021 1006  Last data filed at 4/21/2021 0957  Gross per 24 hour   Intake 666.07 ml   Output 1645 ml   Net -978.93 ml     CONSTITUTIONAL:  awake, alert, cooperative, no apparent distress, and appears stated age  NECK:  Supple, symmetrical, trachea midline, no adenopathy, thyroid symmetric, not enlarged and no tenderness, skin normal  LUNGS:  no increased work of breathing and basilar crackles to auscultation. No accessory muscle use. He has a congested cough  CARDIOVASCULAR: S1 and S2, no edema and no JVD  ABDOMEN:  normal bowel sounds, non-distended and no masses palpated, and no tenderness to palpation. No hepatospleenomegaly  LYMPHADENOPATHY:  no axillary or supraclavicular adenopathy. No cervical adnenopathy  PSYCHIATRIC: Oriented to person place and time. No obvious depression or anxiety. MUSCULOSKELETAL: No obvious misalignment or effusion of the joints. No clubbing, cyanosis of the digits. SKIN:  normal skin color, texture, turgor and no redness, warmth, or swelling.  No palpable nodules    DATA:    Old records have been reviewed    CBC:  Recent Labs     04/19/21 0245 04/20/21  0545 04/21/21  0420   WBC 9.7 11.5* 10.1   RBC 4.53 4.34 4.34   HGB 12.1* 11.4* 11.6*   HCT 38.9* 37.1* 36.9*   * 122* 111*   MCV 85.9 85.5 84.9   MCH 26.7 26.2 26.6   MCHC 31.1 30.7* 31.3   RDW 18.1* 18.5* 18.1*      BMP:  Recent Labs     04/19/21  0245 04/20/21  0545 04/21/21  0420    146* 145   K 4.7 3.9 3.9   CL 99 99 99   CO2 37* 39* 39*   BUN 43* 47* 64*   CREATININE 1.1 1.1 1.5*   CALCIUM 8.8 8.7 8.4   GLUCOSE 245* 202* 187*      ABG:  Recent Labs     04/19/21  0004   PHART 7.343*   [id number] 74.6*   PO2ART 176.0*   CQU9JWH 40.5*   [id number] 99.8   BEART 11.8*     Procalcitonin  Recent Labs     04/18/21 1955 04/20/21  0545   PROCAL 0.12 0.10       No results found for: BNP  Lab Results   Component Value Date    TROPONINI <0.01 04/19/2021           Radiology Review:  All pertinent images / reports were reviewed as a part of this visit. Assessment:     1. Acute on chronic hypoxemic and hypercapnic respiratory failure  2. Acute pulmonary edema  3. Severe COPD/centrilobular emphysema  4. Pulmonary fibrosis  5. Acute on chronic diastolic heart failure      Plan:     I have reviewed laboratories, medical records and images for this visit  Appreciate cardiology consult  Remains on Lasix infusion 5 mg/h  Creatinine, however, is up to 1.5  Stop Lasix drip  Start Lasix 40 mg IV push daily beginning tomorrow. Also growing Serratia from sputum  Procalcitonin has remained low  I do think we should treat the Serratia. Based on sensitivities start cefepime. Remains edematous despite a net diuresis of about 2.5 L. Discussion with patient and his wife at the bedside this morning about resuscitation  Does not want chest compressions or intubation.   I made him DNR CCA

## 2021-04-21 NOTE — PROGRESS NOTES
CLINICAL BEDSIDE SWALLOWING EVALUATION  Speech Therapy Department    Patient Name:  Savannah Vance  :  10/3/1932  Pain level: denied  Medical Diagnosis:   Acute on chronic diastolic heart failure (HCC) [I50.33]    HPI: \"This is a pleasant 80 y.o. male with history of severe COPD, pulmonary fibrosis, chronic respiratory failure with hypoxia and hypercapnia (10 L/min supplemental oxygen at baseline), severe pulmonary hypertension, obstructive sleep apnea, chronic diastolic CHF, chronic steroid use (prednisone 20 mg daily), obesity with BMI of 31 kg/m², who was recently evaluated by primary care physician within the past 2 weeks for ongoing cough, shortness of breath, sputum production, recently placed on steroid taper and doxycycline, who presents to the emergency room with complaints of worsening cough, shortness of breath, over the last 2 days. He does not have fever or chills. He has had progressive worsening of shortness of breath over the last 6 months. He was noted to be saturating 87% on 11 L via nasal cannula, transitioned to 100% supplemental oxygen via nonrebreather and had to be transitioned to BiPAP in the emergency room. \"    CXR: 21  Impression   Persistent pulmonary edema pattern with asymmetric airspace disease in the   lung bases greater on the left with pleural effusion.  This is superimposed   upon interstitial lung disease and emphysema as best demonstrated on recent   CT. CT Chest: 21  Impression   Bilateral lower lobe airspace disease suspicious for pneumonia, with small   right pleural effusion.  This is occurring on a background of pulmonary   emphysema and pulmonary fibrosis with chronic left pleural thickening. Interval mediastinal lymph node enlargement is likely reactive but could be   reassessed with chest CT when the patient is clinically baseline     MBS: 3/10/20  \"Modified Barium Swallow evaluation completed on 3/10/2020.   Results indicate moderate/severe AP propulsion, and premature spillage to pharynx. Pharyngeal phase is characterized by delayed initiation of swallow, suspected reduced pharyngeal clearing, suspected pharyngeal pooling, suspected reduced airway protection, and apparent reduced breathing/swallowing coordination. Per wife, pt has been drinking thin liquids via straw while in hospital. Educated re: increased risk for aspiration with straws. Thins via straw revealed rapid bolus loss to pharynx with delayed swallow initiation and suspected pharyngeal pooling. Audible swallow noted. Improved bolus control with thins via cup; however, pt demonstrated one immediate and one delayed cough with thin liquids. Mildly thick liquids revealed again improved bolus control without overt s/s aspiration. Pt cleared pureed solids from oral cavity grossly timely; however, soft and regular solids required increased mastication time. Reduced bolus formation was noted with mild diffuse oral residue, requiring liquid rinse or puree rinse to clear. Pt needed to take sip of liquids to soften regular texture paco crackers to assist in mastication and oral clearance. At this time, pt would benefit from modified diet. Overall, pt demonstrates reduced endurance and fatigue with increased PO trials, which places him at greater risk for reduced breathing/swallowing coordination, and therefore, aspiration. Discussed options for liquids and solids; pt/wife in agreement that downgrade to Dysphagia III Soft and Bite-Sized diet is appropriate. Wife is able to select menu items that will be soft and is able to cook soft/moist/tender foods when returning to home. Pt would likely benefit from completion of Modified Barium Swallow; however, upon discussion with pt/spouse, neither wanted to \"put him through that again. \" Discussed safer recommendation of downgrade to mildly (nectar) thick liquids at this time; pt also indicated not wanting to be on thickened liquids again if that is the recommendation. SLP provided extensive education re: increased risk for aspiration especially with history of silent aspiration. Pt and wife verbalized understanding; expressed desire to stay on thin liquids despite risk of aspiration. Pt would benefit from further therapy to address use of swallow strategies/aspiration precautions to minimize risk of aspiration and/or swallow exercises if indicated. Provided education re:  mixed consistencies if wanting, eating open faced sandwiches, aspiration precautions, avoiding straws, and possibly trying eating smaller meals more frequently throughout the day to reduced risk of fatigue with increasing amounts of PO (as wife indicated pt sometimes will take an hour to eat a meal). Pt and wife v/u of all education; pt does require ongoing education. Dietary Recommendations:  Dysphagia III Soft and Bite-Sized with thin liquids with STRICT aspiration precautions; meds in puree. NO straws    Strategies: 90 degree positioning with all p.o. intake; small bites/sips; alternate textures through meal; reduce rate of intake    Treatment/Goals: Speech therapy for dysphagia tx 3-5 times per week. ST.) Pt will tolerate recommended diet without s/s of aspiration   2.) If clinical symptoms of penetration/aspiration continue to be noted,Pt will tolerate MBS to r/o aspiration and determine appropriate diet/liquid level.    3.) Pt will tolerate diet advance to least restricted diet, as clinically indicated, with no signs of aspiration   4.) Pt will improve oral motor function via bolus control exercises 5/5    Oral motor Exam:  Dentition: upper/lower dentures  Labial/Facial: reduced strength/ROM/coordination  Lingual: reduced strength/ROM/coordination  Voice: WFL    Oral Phase:   Reduced/prolonged mastication  Reduced A-P propulsion  Apparent premature bolus loss to pharynx  Reduced bolus formation and control  Uncleared oral stasis    Pharyngeal Phase:  Apparent pharyngeal pooling  Delayed swallow initiation  Decreased laryngeal elevation via palpation   Apparent decreased pharyngeal clearing  Coughing (immediate) with thins x1  Coughing (delayed) with thins x1  Reduced airway closure  Reduced breathing/swallowing coordination    Patient/Family Education:Education, results and recommendations given to the Pt, spouse, and nurse, who verbalized understanding. Pt benefits from ongoing education/reinforcement of information. Timed Code Treatment: 0 minutes    Total Treatment Time: 35 minutes    Discharge Recommendations: Speech Therapy for Speech/Dysphagia treatment at discharge. If patient discharges prior to next session this note will serve as a discharge summary. Signature:    ANGELY Worthy  Speech-Language Pathologist

## 2021-04-22 NOTE — PROGRESS NOTES
Comprehensive Nutrition Assessment    Type and Reason for Visit:  Reassess    Nutrition Recommendations/Plan:   1. Continue current diet per SLP  2. Continue supplements  3. Encourage po    Nutrition Assessment:  Pt is on bipap for most of the day, but is able to come off of it to eat. SLP evaluated pt and changed pt to dysphagia soft and bite sized. Pt consuming 100% of supplements. Pt denies any n/v. RD to continue sending supplement. No other needs at this time. Malnutrition Assessment:  Malnutrition Status:  No malnutrition      Estimated Daily Nutrient Needs:  Energy (kcal):  4761-9086 cals (25-30 cals/77kg); Weight Used for Energy Requirements:  Current     Protein (g):  81-93 gms (1.2-1.5 gms/62kg IBW); Weight Used for Protein Requirements:  Ideal        Fluid (ml/day):  2000 ml; Method Used for Fluid Requirements:  1 ml/kcal      Nutrition Related Findings:  I/O's: -3.3L; BLE +1      Wounds:  None       Current Nutrition Therapies:    Dietary Nutrition Supplements: Low Calorie High Protein Supplement  Dietary Nutrition Supplements: Other Oral Supplement (see comment)  DIET LOW SODIUM 2 GM; Carb Control: 5 carb choices (75 gms)/meal; Dysphagia Soft and Bite-Sized; 2000 ml; No Drinking Straw    Anthropometric Measures:  · Height: 5' 5\" (165.1 cm)  · Current Body Weight: 170 lb (77.1 kg)   · Usual Body Weight: 188 lb (85.3 kg)     · Ideal Body Weight: 136 lbs; % Ideal Body Weight 130.9 %    · BMI Categories: Overweight (BMI 25.0-29. 9)       Nutrition Diagnosis:   · Inadequate oral intake related to inadequate protein-energy intake, impaired respiratory function as evidenced by intake 0-25%      Nutrition Interventions:   Food and/or Nutrient Delivery:  Continue Current Diet, Continue Oral Nutrition Supplement  Nutrition Education/Counseling:  No recommendation at this time   Coordination of Nutrition Care:  Continue to monitor while inpatient    Goals:  New goal po 50% at meals and supp       Nutrition Monitoring and Evaluation:   Behavioral-Environmental Outcomes:  None Identified   Food/Nutrient Intake Outcomes:  Food and Nutrient Intake, Supplement Intake  Physical Signs/Symptoms Outcomes:  Weight     Discharge Planning:     Too soon to determine     Electronically signed by Renate Brown RD, [address] , [place] on 4/22/21 at 11:10 AM EDT    Contact: [phone]

## 2021-04-22 NOTE — PROGRESS NOTES
P Pulmonary and Critical Care  Progress note      Reason for Consult: Acute on chronic hypoxemic and hypercapnic respiratory failure, congestive heart failure  Requesting Physician: Dr Ulisses Prajapati:   [address] [facility]:                The patient is a 80 y.o. male with significant past medical history of:      Diagnosis Date    Arthritis     Emphysema of lung ([facility] [address]     Full dentures     Hearing aid worn     bilateral    Hypertension     On home oxygen therapy     Pulmonary fibrosis ([facility] [address]     Sleep apnea     uses CPAP     Interval history: Patient continues to demonstrate acute on chronic respiratory failure now requiring 15 L/min HFNC and BiPAP mask support to maintain saturations in the high 80s to low 90s. 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     VENTRAL HERNIA REPAIR  08/2009     Current Medications:    Current Facility-Administered Medications: insulin glargine (LANTUS;BASAGLAR) injection pen 15 Units, 15 Units, Subcutaneous, Nightly  cefepime (MAXIPIME) 2000 mg IVPB minibag, 2,000 mg, Intravenous, 2 times per day  furosemide (LASIX) injection 40 mg, 40 mg, Intravenous, Daily  methylPREDNISolone sodium (SOLU-MEDROL) injection 40 mg, 40 mg, Intravenous, Q12H  spironolactone (ALDACTONE) tablet 12.5 mg, 12.5 mg, Oral, Daily  insulin lispro (1 Unit Dial) 0-12 Units, 0-12 Units, Subcutaneous, TID WC  insulin lispro (1 Unit Dial) 0-6 Units, 0-6 Units, Subcutaneous, Nightly  potassium chloride (KLOR-CON M) extended release tablet 40 mEq, 40 mEq, Oral, PRN **OR** potassium bicarb-citric acid (EFFER-K) effervescent tablet 40 mEq, 40 mEq, Oral, PRN **OR** potassium chloride 10 mEq/100 mL IVPB (Peripheral Line), 10 mEq, Intravenous, PRN  albuterol sulfate  (90 Base) MCG/ACT inhaler 2 puff, 2 puff, Inhalation, Daily PRN  ipratropium-albuterol (DUONEB) nebulizer solution 1 ampule, 1 ampule, Inhalation, Q4H WA  allopurinol (ZYLOPRIM) tablet 100 mg, 100 mg, Oral, Daily  Arformoterol Tartrate (BROVANA) nebulizer solution 15 mcg, 15 mcg, Nebulization, BID  atorvastatin (LIPITOR) tablet 20 mg, 20 mg, Oral, Nightly  budesonide (PULMICORT) nebulizer suspension 500 mcg, 500 mcg, Nebulization, BID  busPIRone (BUSPAR) tablet 5 mg, 5 mg, Oral, TID  clopidogrel (PLAVIX) tablet 75 mg, 75 mg, Oral, Daily  dilTIAZem (CARDIZEM CD) extended release capsule 120 mg, 120 mg, Oral, Daily  escitalopram (LEXAPRO) tablet 5 mg, 5 mg, Oral, Daily  metoprolol tartrate (LOPRESSOR) tablet 25 mg, 25 mg, Oral, BID  glucose (GLUTOSE) 40 % oral gel 15 g, 15 g, Oral, PRN  dextrose 50 % IV solution, 12.5 g, Intravenous, PRN  glucagon (rDNA) injection 1 mg, 1 mg, Intramuscular, PRN  dextrose 5 % solution, 100 mL/hr, Intravenous, PRN  insulin lispro (1 Unit Dial) 4 Units, 0.05 Units/kg, Subcutaneous, TID WC  sodium chloride flush 0.9 % injection 5-40 mL, 5-40 mL, Intravenous, 2 times per day  sodium chloride flush 0.9 % injection 10 mL, 10 mL, Intravenous, PRN  0.9 % sodium chloride infusion, 25 mL, Intravenous, PRN  promethazine (PHENERGAN) tablet 12.5 mg, 12.5 mg, Oral, Q6H PRN **OR** ondansetron (ZOFRAN) injection 4 mg, 4 mg, Intravenous, Q6H PRN  polyethylene glycol (GLYCOLAX) packet 17 g, 17 g, Oral, Daily PRN  acetaminophen (TYLENOL) tablet 650 mg, 650 mg, Oral, Q6H PRN **OR** acetaminophen (TYLENOL) suppository 650 mg, 650 mg, Rectal, Q6H PRN  enoxaparin (LOVENOX) injection 40 mg, 40 mg, Subcutaneous, Daily    Allergies   Allergen Reactions    Aspirin Hives and Swelling    Dilaudid [Hydromorphone Hcl] Other (See Comments)     Severe hallucination       Social History:    TOBACCO:   reports that he quit smoking about 20 years ago. His smoking use included cigarettes. He has a 100.00 pack-year smoking history. He has never used smokeless tobacco.  ETOH:   reports current alcohol use of about 8.0 standard drinks of alcohol per week. Patient currently lives independently  Environmental/chemical exposure: None known    Family History:       Problem Relation Age of Onset    Early Death Mother 43        ? MI    Alcohol Abuse Father     Colon Cancer Sister     Emphysema Brother         black lung    Colon Cancer Sister     Cancer Brother         stomach    Cancer Brother         liver    Lung Cancer Brother     Colon Cancer Brother     Kidney Disease Brother     Other Brother         sydenham chorea     REVIEW OF SYSTEMS:    CONSTITUTIONAL:  negative for fevers, chills, diaphoresis, activity change, appetite change, fatigue, night sweats and unexpected weight change.    EYES:  negative for blurred vision, eye discharge, visual disturbance and icterus  HEENT:  negative for hearing loss, tinnitus, ear drainage, sinus pressure, nasal congestion, epistaxis and snoring  RESPIRATORY:  See HPI  CARDIOVASCULAR:  negative for chest pain, palpitations, exertional chest pressure/discomfort, edema, syncope  GASTROINTESTINAL:  negative for nausea, vomiting, diarrhea, constipation, blood in stool and abdominal pain  GENITOURINARY:  negative for frequency, dysuria, urinary incontinence, decreased urine volume, and hematuria  HEMATOLOGIC/LYMPHATIC:  negative for easy bruising, bleeding and lymphadenopathy  ALLERGIC/IMMUNOLOGIC:  negative for recurrent infections, angioedema, anaphylaxis and drug reactions  ENDOCRINE:  negative for weight changes and diabetic symptoms including polyuria, polydipsia and polyphagia  MUSCULOSKELETAL:  negative for  pain, joint swelling, decreased range of motion and muscle weakness  NEUROLOGICAL:  negative for headaches, slurred speech, unilateral weakness  PSYCHIATRIC/BEHAVIORAL: negative for hallucinations, Labs     04/20/21  0545   PROCAL 0.10       No results found for: BNP  Lab Results   Component Value Date    TROPONINI <0.01 04/19/2021           Radiology Review:  All pertinent images / reports were reviewed as a part of this visit. Assessment:     1. Acute on chronic hypoxemic and hypercapnic respiratory failure  2. Acute pulmonary edema  3. Severe COPD/centrilobular emphysema  4. Pulmonary fibrosis  5. Acute on chronic diastolic heart failure      Plan:     I have reviewed laboratories, medical records and images for this visit  [address] cardiology consult  Now starting on spironolactone  Lasix infusion stopped yesterday and his creatinine is a little better  Now starting Lasix 40 mg IV push once daily  Try to see how he does with 10 L/min HFNC. He can receive this level of oxygen support at home. He does also have a noninvasive ventilator at home. We will repeat chest x-ray in the a.m. Speech has been working with him and he is doing well on his modified diet  He is agreeable to palliative care at the time of discharge.

## 2021-04-22 NOTE — DISCHARGE INSTR - COC
Continuity of Care Form    CHF PATHWAY    Systolic/Diastolic    EF: 58%    _x_ Daily Visits x 3     _x_Cardiovascular Assessment. Titrate O2 to keep SaO2 greater than 90%    _x_ Daily Weights- Baseline Wt: 170 lbs   Call MD if:   3 pound weight gain or loss in one day OR 5 pound weight gain in one week       _x_ Labs:   BMP,BNP     Please start on 21   Frequency: Weekly x 4              Fax results to: [facility]F Clinic: [phone]      _x_ Med List attached:   Hold Coreg/Metoprolol if HR less than 45 or patient symptomatic*   Hold ACE/ARB if SBP less than 85 or patient symptomatic*   Do not hold Spironolactone (aldactone) for hypotension/bradycardia   Call MD for questions: CHF Clinic: [phone]    _x_Follow up appointment with cardiology: date/time: ***      x_ [facility] for home health  And telehealth monitoring. For CHF/COPD.        Patient Name: Joya Dumont   :  10/3/1932  MRN:  [id number]    Admit date:  2021  Discharge date:  ***    Code Status Order: DNR-CCA   Advance Directives:   [address] Directive Type of Healthcare Directive Copy in [address] Agent's Name Healthcare Agent's Phone Number    [phone]  Yes, patient has an advance directive for healthcare treatment  Durable power of  for health care;Living will  No, copy requested from family  --  --  --          Admitting Physician:  Ana Perry MD  PCP: Nando Miranda MD    Discharging Nurse: [facility] Unit/Room#: XBW-7219/4631-44  Discharging Unit Phone Number: ***    Emergency Contact:   Extended Emergency Contact Information  Primary Emergency Contact: Misa Palencia  Address: [address] [place] [address], [address] [place] [address] Phone: [phone]  Mobile Phone: [phone]  Relation: Spouse  Secondary Emergency Contact: Malika Gutierrez  Address: [place] [id number]  Home Phone: [phone]  Relation: Child    Past Surgical History:  Past Surgical History:   Procedure Laterality Date    ABDOMINAL AORTIC ANEURYSM REPAIR  08/2009    [place]    CARPAL TUNNEL RELEASE Right [id number]    CATARACT REMOVAL Bilateral 2003    FINGER AMPUTATION Right 1934    Traumatic right ring    SKIN CANCER EXCISION  06/2019    basil on right side of face, melanoma on the left upper arm.  VENTRAL HERNIA REPAIR  08/2009       Immunization History:   Immunization History   Administered Date(s) Administered    COVID-19, Henderson Peter, PF, 30mcg/0.3mL 01/21/2021, 02/11/2021    Hepatitis A 01/01/2008    Hepatitis B (Engerix-B) 07/01/2008    Influenza Vaccine, unspecified formulation 10/01/2016, 10/01/2018    Influenza, High Dose (Fluzone 65 yrs and older) 11/01/2017    Influenza, Quadv, adjuvanted, 65 yrs +, IM, PF (Fluad) 10/01/2020    Pneumococcal Conjugate 13-valent (Xlocaae94) 01/01/2015    Pneumococcal Polysaccharide (Qjaarzdvx90) 01/01/2007    Td, unspecified formulation 07/28/2008    Zoster Live (Zostavax) 01/01/2012    Zoster Recombinant (Shingrix) 06/01/2018, 01/14/2019       Active Problems:  Patient Active Problem List   Diagnosis Code    COPD, severe ([address] J44.9    Pulmonary fibrosis ([address] J84.10    Obstructive sleep apnea G47.33    HTN (hypertension) I10    Centrilobular emphysema ([address] J43.2    Carpal tunnel syndrome G56.00    Primary localized osteoarthrosis, forearm M19.039    Chronic respiratory failure ([address] J96.10    Bilateral carotid artery stenosis I65.23    Pure hypercholesterolemia E78.00    Coronary artery disease involving native coronary artery of native heart without angina pectoris I25.10    Diabetes mellitus ([address] E11.9    Oxygen dependent Z99.81    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 9XX0    Pulmonary nodule R91.1    Palpitations R00.2    SVT (supraventricular tachycardia) ([facility]) I47.1    NSVT (nonsustained ventricular tachycardia) ([facility]) I47.2    Acute on chronic respiratory failure with hypoxia ([facility]) J96.21    Pulmonary HTN (HCC) I27.20    Anxiety F41.9    Acute on chronic diastolic heart failure (HCC) I50.33       Isolation/Infection:   Isolation          No Isolation        Patient Infection Status     Infection Onset Added Last Indicated Last Indicated By Review Planned Expiration Resolved Resolved By    None active    Resolved    COVID-19 Rule Out 04/19/21 04/19/21 04/19/21 COVID-19, Rapid (Ordered)   04/19/21 Rule-Out Test Resulted          Nurse Assessment:  Last Vital Signs: /77   Pulse 72   Temp 97.7 °F (36.5 °C) (Temporal)   Resp 30   Ht 5' 5\" (1.651 m)   Wt 170 lb 10.2 oz (77.4 kg)   SpO2 94%   BMI 28.40 kg/m²     Last documented pain score (0-10 scale): Pain Level: 3  Last Weight:   Wt Readings from Last 1 Encounters:   04/22/21 170 lb 10.2 oz (77.4 kg)     Mental Status:  {IP PT MENTAL STATUS:20030}    IV Access:  { MELE IV ACCESS:631233666}    Nursing Mobility/ADLs:  Walking   {CHP DME CFRF:744828065}  Transfer  {CHP DME NGIQ:895313334}  Bathing  {CHP DME FSLQ:039969367}  Dressing  {CHP DME PSSH:996367260}  Toileting  {CHP DME OPCN:625018478}  Feeding  {P DME TDEU:209661318}  Med Admin  {P DME HIMK:568497545}  Med Delivery   { MELE MED Delivery:152747334}    Wound Care Documentation and Therapy:        Elimination:  Continence:   · Bowel: {YES / ON:10261}  · Bladder: {YES / [place]:16063}  Urinary Catheter: {Urinary Catheter:255461066}   Colostomy/Ileostomy/Ileal Conduit: {YES / NR:40808}       Date of Last BM: ***    Intake/Output Summary (Last 24 hours) at 4/22/2021 1701  Last data filed at 4/22/2021 0600  Gross per 24 hour   Intake 120 ml   Output 535 ml   Net -415 ml     I/O last 3 completed shifts:   In: 120 [P.O.:120]  Out: 585 [Urine:585]    Safety Concerns:     [address] Admission H&P: {CHP DME Changes in [facility]:259196395}    PHYSICIAN SIGNATURE:  {Esignature:499503243}

## 2021-04-22 NOTE — PROGRESS NOTES
Physical Therapy    Facility/Department: [facility]  Initial Assessment    NAME: Giuseppe Lynch  : 10/3/1932  MRN: [id number]    Date of Service: 2021    Discharge Recommendations:  Giuseppe Lynch scored a 17/24 on the AM-PAC short mobility form. Current research shows that an AM-PAC score of 18 or greater is typically associated with a discharge to the patient's home setting. Based on the patient's AM-PAC score and their current functional mobility deficits, it is recommended that the patient have 2-3 sessions per week of Physical Therapy at d/c to increase the patient's independence. At this time, this patient demonstrates the endurance and safety to discharge home with  home services) and a follow up treatment frequency of 2-3x/wk. Please see assessment section for further patient specific details. HOME HEALTH CARE: LEVEL 4 SICK     - Initial home health evaluation to occur within 24-48 hours, in patient home   - Therapy evaluations in home within 24-48 hours of discharge; including DME and home safety   - Frontload therapy 5 days, then 3x a week   - Therapy to evaluate if patient has [address] needs for personal care   -  evaluation within 24-48 hours, includes evaluation of resources and insurance to determine [place], [place], LTC, and Medicaid options    If patient discharges prior to next session this note will serve as a discharge summary. Please see below for the latest assessment towards goals. S Level 4   PT Equipment Recommendations  Equipment Needed: Yes  Mobility Devices: Octavio Alexandro; Hospital Bed; Wheelchair  Walker: Rollator (4 Wheeled); Rolling  Wheelchair: Transport Chair  Other: Patient needs hospital bed as patient is aspiration risk and needs frequent respositioning for skin protection and improved pulmonary function. Patient needs transport w/c for primary means of mobility as unable to ambulate due to poor pulmonary function.  Needs transport chair as patient would be unable to self propel. Assessment   Body structures, Functions, Activity limitations: Decreased functional mobility ; Decreased endurance;Decreased strength;Decreased balance  Assessment: Patient not at baseline function and would benefit from skilled PT to address above deficits and facilitate return to baseline function. Patient signficantly limited by respiratory status. Spouse and patient very hopeful for home discharge and plan on moving patient to 1st floor of home and souse willing and able to be 24 hour caregiver. Discussed at length DME needs for home as well as need to balance mobility with respiratory status. Treatment Diagnosis: decreased functional mobility, decreased endurance  Prognosis: Fair  Decision Making: Medium Complexity  Clinical Presentation: evovling  PT Education: Goals;PT Role;Plan of Care;Family Education;Equipment  Patient Education: Significant time spend on education on energy conservation, DME needs and safety in home with spouse and patient. All questions answered. -Spouse verbalized understanding (patient unable to verbalize understanding due to bipap)  Barriers to Learning: none  REQUIRES PT FOLLOW UP: Yes  Activity Tolerance  Activity Tolerance: limited by respiratory status. SpO2 87-88% on Bipap supine in bed. Decreased to 86% on Bipap during transfer. Increased ~1-2 minute rest break to 94% on Bipap in chair. Nursing alerted. Patient Diagnosis(es): The primary encounter diagnosis was COPD exacerbation ([address] Diagnoses of Acute on chronic congestive heart failure, unspecified heart failure type ([address] and Acute on chronic respiratory failure with hypoxia and hypercapnia (HCC) were also pertinent to this visit. has a past medical history of Arthritis, Emphysema of lung ([address] Full dentures, Hearing aid worn, Hypertension, On home oxygen therapy, Pulmonary fibrosis ([address] and Sleep apnea.    has a past surgical history that includes Abdominal aortic aneurysm repair (08/2009); Carpal tunnel release (Right, 32092396); Cataract removal (Bilateral, 2003); Finger amputation (Right, 1934); ventral hernia repair (08/2009); and Skin cancer excision (06/2019). Restrictions  Restrictions/Precautions  Restrictions/Precautions: Fall Risk(HIGH FALL RISK;)  Required Braces or Orthoses?: No  Position Activity Restriction  Other position/activity restrictions: Nicolasa Oneill is a 80 y.o. male who presents to the emergency department today for evaluation for shortness of breath. The patient has a history of COPD/emphysema, history of pulmonary fibrosis. The patient is followed by Dr. Honey Grande, pulmonology. The patient states that since Friday he has had increasing cough, and increasing shortness of breath. The wife is at bedside and she states that the cough has been productive of thick mucus, and so she states that she started him on doxycycline as prescribed by Dr. Tonny Pineda. She states that she also started him on the prednisone. She states that the patient's shortness of breath has been worse over the past 3 days, when patient arrived to the ED he is on a nonrebreather, and O2 sats are 89% on room air the patient denies any chest pain. He is not had any fever or chills. Is not had any abdominal pain, nausea, vomiting or diarrhea. No urinary symptoms. Patient has not had any congestion, he states that he has been vaccinated for COVID-19. He states that he has noticed some lower leg edema but does not feel that he has gained any weight, and the patient otherwise has no complaints at this time. Vision/Hearing  Vision: Within Functional Limits  Hearing: Within functional limits     Subjective  General  Chart Reviewed: Yes  Patient assessed for rehabilitation services?: Yes  Family / Caregiver Present: Yes(spouse)  Diagnosis: acute on chronic heart failure  Follows Commands: Within Functional Limits  General Comment  Comments: supine in bed upon arrival. On Bipap.  Nursing approval obtained prior to mobility  Subjective  Subjective: Denied pain. Agreeable to therapy with education regarding role of acute therapy and encouragement from spouse. Pain Screening  Patient Currently in Pain: Denies          Orientation  Orientation  Overall Orientation Status: Within Functional Limits  Social/Functional History  Social/Functional History  Lives With: Spouse(spouse is paid caregiver)  Type of Home: House  Home Layout: Two level(stair lift to 2nd floor)  Bathroom Shower/Tub: Tub/Shower unit  Home Equipment: Hospital bed, Electric scooter  Receives Help From: Family  ADL Assistance: Needs assistance(spouse assists with lower body dressing/bathing)  Homemaking Responsibilities: No  Ambulation Assistance: Independent(short distance ambulation only due to poor pulmonary function)  Transfer Assistance: Independent  Additional Comments: plans on living in \"sun room\" on main level, denied recent falls, plans on getting BSC    Objective          AROM RLE (degrees)  RLE AROM: WFL  AROM LLE (degrees)  LLE AROM : WFL  Strength RLE  Strength RLE: WFL  Strength LLE  Strength LLE: WFL        Bed mobility  Supine to Sit: Moderate assistance(provided by spouse)  Scooting: Minimal assistance  Comment: pt anxious, stating \"get me up! \" and \"wait! \". Spouse assisted with bed mobility as patient anxious and asking her for assistance. Once seated EOB, able to reassure patient and spouse on therapy plan and therapist able to assess transfer without spouse assistance. Transfers  Sit to Stand: Minimal Assistance  Stand to sit: Minimal Assistance  Bed to Chair: Minimal assistance(reporting pain on skin on back from gait belt.  Assistance provided under ischials with no report of pain)        Balance  Sitting - Static: Good  Sitting - Dynamic: Fair  Standing - Static: Fair  Standing - Dynamic: [address] [place] [address]  Times per week: 3-5  Times per day: Daily  Current Treatment Recommendations: Strengthening, Balance Training, Functional Mobility Training, Transfer Training, Endurance Training, Gait Training, Safety Education & Training, Home Exercise Program  Safety Devices  Type of devices: All fall risk precautions in place, Call light within reach, Nurse notified, Left in chair, Chair alarm in place  Restraints  Initially in place: No      AM-PAC Score  AM-PAC Inpatient Mobility Raw Score : 17 (04/22/21 1349)  AM-PAC Inpatient T-Scale Score : 42.13 (04/22/21 1349)  Mobility Inpatient CMS 0-100% Score: 50.57 (04/22/21 1349)  Mobility Inpatient CMS G-Code Modifier : CK (04/22/21 1349)          Goals  Short term goals  Time Frame for Short term goals: To be met prior to discharge  Short term goal 1: Bed mobility with supervision  Short term goal 2: Sit to/from stand with supervision  Short term goal 3: Ambulate 10 feet with RW and CGA  Short term goal 4: Bed to/from chair with CGA  Patient Goals   Patient goals :  To be present for family reuinion in June       Therapy Time   Individual Concurrent Group Co-treatment   Time In [address] [place] [address]         Timed Code Treatment Minutes: [address], PT     Thanks, Kory Ha, PT, DPT [id number]

## 2021-04-22 NOTE — PROGRESS NOTES
Occupational Therapy   Occupational Therapy Initial Assessment  Date: 2021   Patient Name: Alcira Schrader  MRN: [id number]     : 10/3/1932    Date of Service: 2021    Discharge Recommendations:    Alcira Schrader scored a 14/24 on the AM-PAC ADL Inpatient form. Current research shows that an AM-PAC score of 18 or greater is typically associated with a discharge to the patient's home setting. Based on the patient's AM-PAC score, and their current ADL deficits, it is recommended that the patient have 2-3 sessions per week of Occupational Therapy at d/c to increase the patient's independence. At this time, this patient demonstrates the endurance and safety to discharge home with home health OT services(home vs OP services) and a follow up treatment frequency of 2-3x/wk. Please see assessment section for further patient specific details. If patient discharges prior to next session this note will serve as a discharge summary. Please see below for the latest assessment towards goals. HOME HEALTH CARE: LEVEL 4 SICK     - Initial home health evaluation to occur within 24-48 hours, in patient home   - Therapy evaluations in home within 24-48 hours of discharge; including DME and home safety   - Frontload therapy 5 days, then 3x a week   - Therapy to evaluate if patient has [address] needs for personal care   -  evaluation within 24-48 hours, includes evaluation of resources and insurance to determine AL, [place], LTC, and Medicaid options    OT Equipment Recommendations  Equipment Needed: Yes  Mobility Devices: ADL Assistive Devices  ADL Assistive Devices: Toileting - 3-in-1 Commode  Other: continue to assess    Assessment   Performance deficits / Impairments: Decreased functional mobility ; Decreased ADL status; Decreased strength;Decreased endurance;Decreased balance  Assessment: Pt is currently functioning below occupational baseline and demo the deficits listed above, pt would benefit from continued skilled OT services to address these deficits and increase independence, safety, and ease with all occupational pursuits  Treatment Diagnosis: Decreased ADL status, functional mobility, functional transfer, and endurance d/t Acute on chronic diastolic heart failure ([address]  Prognosis: Fair;Good  Decision Making: Medium Complexity  OT Education: OT Role;Plan of Care;Transfer Training;Energy Conservation; Family Education;Equipment  Patient Education: extensive education provided on DME that can be utilized at home- pt's spouse verbalized understanding  REQUIRES OT FOLLOW UP: Yes  Activity Tolerance  Activity Tolerance: Patient Tolerated treatment well;Patient limited by fatigue  Activity Tolerance: Pt at 88% on bipap upon arrival while supine in bed. Pt 02 dropped to 86% after transfer, however, 94% seated in recliner chair with use of PLB. Pt on Bipap throughout session. Safety Devices  Safety Devices in place: Yes  Type of devices: All fall risk precautions in place; Left in chair;Chair alarm in place;Call light within reach; Patient at risk for falls;Nurse notified           Patient Diagnosis(es): The primary encounter diagnosis was COPD exacerbation ([address] Diagnoses of Acute on chronic congestive heart failure, unspecified heart failure type ([address] and Acute on chronic respiratory failure with hypoxia and hypercapnia (HCC) were also pertinent to this visit. has a past medical history of Arthritis, Emphysema of lung ([address] Full dentures, Hearing aid worn, Hypertension, On home oxygen therapy, Pulmonary fibrosis ([address] and Sleep apnea. 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     Treatment Diagnosis: Decreased ADL status, functional mobility, functional transfer, and endurance d/t Acute on chronic diastolic heart failure ([facility]      Restrictions  Restrictions/Precautions  Restrictions/Precautions: Fall Risk(HIGH FALL RISK;)  Required Braces or Orthoses?: No  Position Activity Restriction  Other position/activity restrictions: Sneha Whitten is a 80 y.o. male who presents to the emergency department today for evaluation for shortness of breath. The patient has a history of COPD/emphysema, history of pulmonary fibrosis. The patient is followed by Dr. Asad Capellan, pulmonology. The patient states that since Friday he has had increasing cough, and increasing shortness of breath. The wife is at bedside and she states that the cough has been productive of thick mucus, and so she states that she started him on doxycycline as prescribed by Dr. Harjit Tuttle. She states that she also started him on the prednisone. She states that the patient's shortness of breath has been worse over the past 3 days, when patient arrived to the ED he is on a nonrebreather, and O2 sats are 89% on room air the patient denies any chest pain. He is not had any fever or chills. Is not had any abdominal pain, nausea, vomiting or diarrhea. No urinary symptoms. Patient has not had any congestion, he states that he has been vaccinated for COVID-19. He states that he has noticed some lower leg edema but does not feel that he has gained any weight, and the patient otherwise has no complaints at this time.     Subjective   General  Chart Reviewed: Yes  Patient assessed for rehabilitation services?: Yes  Additional Pertinent Hx: PMH:  severe COPD, pulmonary fibrosis, chronic respiratory failure with hypoxia and hypercapnia (10 L/min supplemental oxygen at baseline), severe pulmonary hypertension, obstructive sleep apnea, chronic diastolic CHF, chronic steroid use (prednisone 20 mg daily), obesity with BMI of 31 kg/m²  Family / Caregiver Present: Yes(spouse present throughout entire session)  Referring Practitioner: Ruth Ann Eubanks Tashi Craig MD  Diagnosis: Acute on chronic diastolic heart failure (HCC)  Subjective  Subjective: Pt supine in bed upon arrival, initially asking for OT/PT to come back later but more agreeable to eval with education and encouragement and wanting to get to chair  Patient Currently in Pain: Denies  Vital Signs  Patient Currently in Pain: Denies    Social/Functional History  Social/Functional History  Lives With: Spouse(spouse is paid caregiver)  Type of Home: House  Home Layout: Two level(stair lift to 2nd floor)  Bathroom Shower/Tub: Tub/Shower unit  Home Equipment: Hospital bed, Electric scooter  Receives Help From: Family  ADL Assistance: Needs assistance(spouse assists with lower body dressing/bathing)  Homemaking Responsibilities: No  Ambulation Assistance: Independent(short distance ambulation only due to poor pulmonary function)  Transfer Assistance: Independent  Additional Comments: plans on living in \"sun room\" on main level, denied recent falls, plans on getting BSC       Objective    Orientation  Overall Orientation Status: Within Functional Limits  Balance  Sitting Balance: Stand by assistance  Standing Balance: Minimal assistance  Standing Balance  Time: 2 minutes  Activity: functional transfer and static stance EOB  Comment: no device, no LOB, pt demo increased pain on back with use of gait belt with movement. Pt and spouse report that his skin is very frail and often hurts with little touch. Functional Mobility  Functional Mobility Comments: functional mobility not assessed this date, pt on Bipap  ADL  Additional Comments: Pt declined ADL participation, spouse states at baseline she provides at least supervision for all ADLs; Physically assists with LB dressing and bathing.   Tone RUE  RUE Tone: Normotonic  Tone LUE  LUE Tone: Normotonic  Coordination  Movements Are Fluid And Coordinated: Yes  Bed mobility  Supine to Sit: Moderate assistance  Sit to Supine: Unable to assess(pt in recliner chair at EOS)  Scooting: Minimal assistance  Transfers  Stand Step Transfers: Minimal assistance(bed>chair)  Sit to stand: Minimal assistance  Stand to sit: Minimal assistance  Cognition  Overall Cognitive Status: [facility]  Cognition Comment: appeared to be [facility] ability to follow commands.  Increased difficulty hearing over Bipap therefore spouse answering majority of questions throughout  Perception  Overall Perceptual Status: [facility]  Times per week: 3-5x/wk  Times per day: Daily  Current Treatment Recommendations: Functional Mobility Training, Endurance Training, Safety Education & Training, Patient/Caregiver Education & Training, Equipment Evaluation, Education, & procurement, Self-Care / ADL, Strengthening, Balance Training    G-Code     OutComes Score                                                  AM-PAC Score        AM-PAC Inpatient Daily Activity Raw Score: 14 (04/22/21 1332)  AM-PAC Inpatient ADL T-Scale Score : 33.39 (04/22/21 1332)  ADL Inpatient CMS 0-100% Score: 59.67 (04/22/21 1332)  ADL Inpatient CMS G-Code Modifier : CK (04/22/21 1332)    Goals  Short term goals  Time Frame for Short term goals: d/c  Short term goal 1: pt will complete functional transfer to ADL surface with CGA  Short term goal 2: pt will complete UB ADLs with setup  Short term goal 3: Pt will complete toileting with CGA  Short term goal 4: Pt will complete bed mobility with SBA  Long term goals  Time Frame for Long term goals : LTG=STG  Patient Goals   Patient goals : to return home and get better to go to family reunion this June       Therapy Time   Individual Concurrent Group Co-treatment   Time In 1130         Time Out 1223         Minutes 53         Timed Code Treatment Minutes: [address] [facility], [address] [place], [place] [id number]

## 2021-04-22 NOTE — PLAN OF CARE
Problem: Falls - Risk of:  Goal: Will remain free from falls  Description: Will remain free from falls  Outcome: Ongoing     Problem: OXYGENATION/RESPIRATORY FUNCTION  Goal: Patient will maintain patent airway  Outcome: Ongoing     Problem: OXYGENATION/RESPIRATORY FUNCTION  Goal: Patient will achieve/maintain normal respiratory rate/effort  Description: Respiratory rate and effort will be within normal limits for the patient  Outcome: Ongoing     Problem: HEMODYNAMIC STATUS  Goal: Patient has stable vital signs and fluid balance  Outcome: Ongoing     Problem: ACTIVITY INTOLERANCE/IMPAIRED MOBILITY  Goal: Mobility/activity is maintained at optimum level for patient  Outcome: Ongoing

## 2021-04-22 NOTE — PROGRESS NOTES
[facility]   Progress Note  CHF/Pulmonary Hypertension Cardiology    Chief complaint: We are following this patient for acute on chronic diastolic HF, pulmonary fibrosis, pulmonary hypertension  HPI:  Gustavo Chino is an 81 yo male who presents to the ED for evaluation of shortness of breath. She has a history of COPD/emphysema, history of pulmonary fibrosis. He is followed by Dr. Rosalia Lui. He came to the ED with increasing cough and increasing shortness of breath. His wife is at bedside. The cough has rossana productive of thick mucus. He was started on doxycycline but this didn't help. He has been on prednisone for some time. The patient's shortness of breath was worse over the past 3 days. Denies chest pain. No fever or chills. No abdominal pain, nausea, vomiting, or diarrhea. No urinary symptoms. He has received COVID vaccine. He has had some lower leg edema. He has not gained any weight.       He has a history of diastolic HF followed by Dr. Monique Preciado for SVT, NSVT, and atrial fibrillation. .       He chronically wears oxygen at 9 liters.       His symptoms were present at rest.  The patient's goals are to get to a family celebration in June.     We are consulted for pulmonary hypertension and fluid overload.     Labs:  Sodium 145  K 3.9  BUN/Cre 64/1.5  Albumin 3.1  H/H 11.6/36.9  WBC 10.1  CXR:  4/20/21:  Impression   Persistent pulmonary edema pattern with asymmetric airspace disease in the   lung bases greater on the left with pleural effusion.  This is superimposed   upon interstitial lung disease and emphysema as best demonstrated on recent   CT.      Echo:  4/19/21:   -Normal left ventricle size, wall thickness, and systolic function with an   estimated ejection fraction of 55-60%.    -No regional wall motion abnormalities are seen.   -Severe biatrial enlargement noted.   -There is mild-to-moderate mitral regurgitation.   -There is severe tricuspid regurgitation.   -Diastolic dysfunction is present however, indeterminate grade.   -Estimated pulmonary artery systolic pressure is severely elevated at 73   mmHg assuming a right atrial pressure of 15 mmHg.     Meds:  Lasix 20 qd  plavix 75 mg po qd  Metoprolol 25 mg po bid  diltiazem 120 mg po qd  Prednisone 20 qd     I/O's negative 2892 cc      ROS:  He has diuresed and is feeling better. BNP a little higher? He is currently wearing BIPAP and feels better. His legs are much less swollen. Wife is at bedside. She is asking good questions about the difference between palliative care and hospice.       Medications/Labs all Reviewed    Lab Results   Component Value Date    WBC 10.1 04/21/2021    HGB 11.6 (L) 04/21/2021    HCT 36.9 (L) 04/21/2021    MCV 84.9 04/21/2021     (L) 04/21/2021     Lab Results   Component Value Date    CREATININE 1.2 04/22/2021    BUN 72 (H) 04/22/2021     (H) 04/22/2021    K 4.0 04/22/2021    CL 99 04/22/2021    CO2 42 (H) 04/22/2021     Lab Results   Component Value Date    INR 1.11 04/18/2021    PROTIME 12.9 04/18/2021        Physical Examination:    /77   Pulse 72   Temp 97.7 °F (36.5 °C) (Temporal)   Resp (!) 40   Ht 5' 5\" (1.651 m)   Wt 170 lb 10.2 oz (77.4 kg)   SpO2 93%   BMI 28.40 kg/m²      Chronically ill appearing  HEENT:  NC/AT  Respiratory:  · Resp Assessment: Normal respiratory effort  · Resp Auscultation: Clear to auscultation bilaterally   Cardiovascular:  · Auscultation: regular rate and rhythm, normal S1S2, no murmur, rub or gallop  · Palpation:  Nl PMI  · JVP:  10-11 cm h2o  · Extremities: 1+ bilateral Edema  Abdomen:  · Soft, non-tender  · Normal bowel sounds  Extremities:  ·  No Cyanosis or Clubbing  Neurological/Psychiatric:  · Oriented to time, place, and person  · Non-anxious  Skin Warm and dry    Lab Results   Component Value Date     04/22/2021     04/21/2021     04/20/2021    K 4.0 04/22/2021    K 3.9 04/21/2021    K 3.9 04/20/2021    K 4.8 01/29/2020    BUN 72 04/22/2021    BUN 64 04/21/2021    BUN 47 04/20/2021    CREATININE 1.2 04/22/2021    CREATININE 1.5 04/21/2021    CREATININE 1.1 04/20/2021    GLUCOSE 196 04/22/2021    GLUCOSE 187 04/21/2021     Lab Results   Component Value Date    PROBNP 2,089 (H) 04/22/2021    PROBNP 1,557 (H) 04/20/2021    PROBNP 2,322 (H) 04/19/2021     Lab Results   Component Value Date    ALT 12 04/21/2021    ALT 9 (L) 04/20/2021    AST 13 (L) 04/21/2021    AST 10 (L) 04/20/2021     Lab Results   Component Value Date    HGB 11.6 04/21/2021    HGB 11.4 04/20/2021    HCT 36.9 04/21/2021    HCT 37.1 04/20/2021     04/21/2021     04/20/2021     Lab Results   Component Value Date    TRIG 77 04/19/2021    TRIG 113 12/04/2020    HDL 47 04/19/2021    HDL 63 12/04/2020    LDLCALC 56 04/19/2021    LDLCALC 81 12/04/2020     Labs were reviewed including labs from other hospital systems through [facility]. Cardiac testing was reviewed including echos, nuclear scans, cardiac catheterization, including from other hospital systems through [facility]. Assessment:    1. COPD exacerbation ([facility]    2. Chronic diastolic congestive heart failure, unspecified heart failure type ([facility]    3. Acute on chronic respiratory failure with hypoxia and hypercapnia (HCC)     4.   Pulmonary hypertension due to chronic respiratory failure from pulmonary fibrosis. 5/  Severe hypoxemia  6. Anemia, rule out iron deficiency     Plan:  1. The patient is chronically ill from COPD and pulmonary fibrosis  2. Discussions with the patient and wife have led the patient to decide about no intubation or CPR  3. He is getting IV Lasix once daily and is diuresing well  4. Continue antibiotics per hospitalist  5. Continue low dose metoprolol and diltiazem for Afib and SVT  6. Continue Plavix  7.  continue spironolactone 12.5 mg po qd for chronic edema and chronic HF  8.   Would ideally like to start low dose ACEI or ARB for HF and

## 2021-04-22 NOTE — PROGRESS NOTES
Speech Language Pathology  Dysphagia Treatment Note    Name:  Judge Stephens  :   10/3/1932  Medical Diagnosis:  Acute on chronic diastolic heart failure (HCC) [I50.33]  Treatment Diagnosis: Oropharyngeal Dysphagia  Pain level: None reported    Current Diet Level: Dysphagia III (Soft and Bite-Sized) with thin liquids with STRICT aspiration precautions; meds in puree. NO straws  Tolerance of Current Diet Level: Per pt's wife report, pt seemingly tolerated dinner meal last night. Assessment of Texture Tolerance:  -Impressions:  Pt seen lying back in bed and on 15 L of O2 via HFNC. Pt's wife was helping pt with breakfast meal consisting of ice-chips, oatmeal, yogurt, and fruit. Per pt's wife, pt seemingly tolerating dinner meal last night. Pt continues to demonstrate prolonged mastication, reduced bolus control, suspected premature bolus loss, reduced a-propulsion, delayed swallow initiation, suspected pharyngeal pooling and/or aspiration, and reduced breathing/swallow coordination. Pt had immediate cough with ~10 ice-chips and delayed cough with fresh fruit; seemingly tolerated multiple bites of oatmeal and yogurt. Re-discussed aspiration precautions (I.e. slow rate of intake, small bites/sips, sitting upright during PO); pt and pt's wife v/u of aspiration risks. At this time, pt and pt's wife would like to stay on current diet and are aware of aspiration risk. Will continue to monitor pt's tolerance. Diet and Treatment Recommendations:  Continue Dysphagia III (Soft and Bite-Sized) with thin liquids with STRICT aspiration precautions; meds in puree.  NO straws    Dysphagia Goals  1.) Pt will tolerate recommended diet without s/s of aspiration. (ongoing 2021)  2.) If clinical symptoms of penetration/aspiration continue to be noted,Pt will tolerate MBS to r/o aspiration and determine appropriate diet/liquid level. (ongoing 2021)  3.) Pt will tolerate diet advance to least restricted diet, as clinically indicated, with no signs of aspiration. (ongoing 4/22/2021)  4.) Pt will improve oral motor function via bolus control exercises 5/5. (ongoing 4/22/2021)    Plan: Continued daily Dysphagia treatment with goals per plan of care. Patient/Family Education: Education given to the pt, pt's wife, and nurse, who verbalized understanding. Discharge Recommendations:  Pt will benefit from continued skilled Speech Therapy for Dysphagia services, prior to returning home. Timed Code Treatment: 0 minutes    Total Treatment Time: 20 minutes    If patient discharges prior to next session this note will serve as a discharge summary. Jose L Escudero M.A., [facility].[id number]  Speech-Language Pathologist    JESUS Ibarra    Clinician    The speech-language pathologist was present, directed the patient's care, made skilled judgment and was responsible for assessment and treatment.

## 2021-04-22 NOTE — PROGRESS NOTES
Drew 32 10/3/1932    History:  Past Medical History:   Diagnosis Date    Arthritis     Emphysema of lung ([address]     Full dentures     Hearing aid worn     bilateral    Hypertension     On home oxygen therapy     Pulmonary fibrosis ([address]     Sleep apnea     uses CPAP       ECHO:    Conclusions      Summary   -Normal left ventricle size, wall thickness, and systolic function with an   estimated ejection fraction of 55-60%. -No regional wall motion abnormalities are seen. -Severe biatrial enlargement noted. -There is mild-to-moderate mitral regurgitation.   -There is severe tricuspid regurgitation.   -Diastolic dysfunction is present however, indeterminate grade.   -Estimated pulmonary artery systolic pressure is severely elevated at 73   mmHg assuming a right atrial pressure of 15 mmHg. Signature      ------------------------------------------------------------------   Electronically signed by Patrick Jay MD, [facility], [address]   (Interpreting physician) on 04/19/2021 at 03:21 PM   ------------------------------------------------------------------    ACE/ARB: No ace/arb, begin after diuresis   BB: Metoprolol Tartrate 25 mg bid   Aldosterone Antagonist: Spironolactone 12.5 mg daily     History of sleep apnea: Yes   Equipment used at home: BIPAP  [place] Screen ordered: No, compliant with BIPAP    DM History: No  DM medications: No diabetic medications     Last Hospital Admission: 1/29/20for respiratory failure   Code Status: DNR-CCA   Discharge plans: Patient to return home with wife, home health and Palliative Care    Family Present: Wife    Mr. Jaimie Lepe was seen for heart failure and pulmonary hypertension which was explained to him. He was getting more short of breath at home with increased need of oxygen, sputum production and cough. He was instructed about daily weights, low sodium and fluid restriction diet.  Medications were explained to him as well. Patient used to belong to [facility] to exercise, has not been doing so since COVID. Patient provided with both written and verbal education on CHF signs/ symptoms, causes, discharge medications, daily weights, low sodium diet, activity, and follow-up. Pt to call if gains 3 pounds in one day or 5 pounds in one week. Mutually agreed upon goals were discussed such as calling the MD as soon as they recognize symptoms and weight gain, maintaining his proper diet, taking medications as prescribed, joining rehab when able. Patient provided with CHF Zone Management tool and CHF symptoms magnet. Discussed importance of lifestyle changes: daily weights, low sodium and fluid restricion diet     PATIENT/CAREGIVER TEACHING:    Level of patient/caregiver understanding able to:   [x ] Verbalize understanding [ ] Demonstrate understanding [ ] Teach back   [ ] Needs reinforcement [ ] Other:       Time spent teachin minutes     1. WEIGHT: Admit Weight: 188 lb (85.3 kg)      Today  Weight: 170 lb 10.2 oz (77.4 kg)   2. I/O     Intake/Output Summary (Last 24 hours) at 2021 1531  Last data filed at 2021 0600  Gross per 24 hour   Intake 120 ml   Output 535 ml   Net -415 ml       Recommendations:   1. Patient will need a follow up appointment within 7 days of discharge. 2. Educate further on fluid restriction 48 oz- 64 oz during inpatient stay so he can understand how to measure intake at home. 3. Continue to educate on S/S.   4. Emphasize daily weights, diet, and knowing when and who to call  5. Provided patient with CHF Resource Line for questions and concerns.   6.CHF Pathway placed on the chart         [address] 2021 3:31 PM

## 2021-04-23 PROBLEM — I50.32 CHRONIC DIASTOLIC HEART FAILURE (HCC): Status: ACTIVE | Noted: 2021-01-01

## 2021-04-23 NOTE — PROGRESS NOTES
Palliative Care:       Patient and wife agree with [facility] to follow in home upon DC. DC pending in next few days. Consulted with CM and Dr. Barry Morales whom agrees with this POC. Consult submitted to [facility] ([address]) FAX#:  [phone]. Will continue to follow as needed.

## 2021-04-23 NOTE — CARE COORDINATION
DME hospital bed received per Dr. Delsa Prader. Palliative Care RN updated to get [facility] ordered. Possible w/e discharge. Will need transportation. Case management will continue to follow progress and update discharge plan as needed.     JESUS MaderaN, .[phone]

## 2021-04-23 NOTE — PLAN OF CARE
Problem: Falls - Risk of:  Goal: Will remain free from falls  Description: Will remain free from falls  4/22/2021 2253 by Zachery Cedeno RN  Outcome: Ongoing     Problem: OXYGENATION/RESPIRATORY FUNCTION  Goal: Patient will achieve/maintain normal respiratory rate/effort  Description: Respiratory rate and effort will be within normal limits for the patient  4/22/2021 2253 by Zachery Cedeno RN  Outcome: Ongoing     Problem: HEMODYNAMIC STATUS  Goal: Patient has stable vital signs and fluid balance  4/22/2021 2253 by Zachery Cedeno RN  Outcome: Ongoing     Problem: FLUID AND ELECTROLYTE IMBALANCE  Goal: Fluid and electrolyte balance are achieved/maintained  4/22/2021 2253 by Zachery Cedeno RN  Outcome: Ongoing     Problem: Skin Integrity:  Goal: Absence of new skin breakdown  Description: Absence of new skin breakdown  4/22/2021 2253 by Zachery Cedeno RN  Outcome: Ongoing     Problem: Pain:  Goal: Control of acute pain  Description: Control of acute pain  4/22/2021 2253 by Zachery Cedeno RN  Outcome: Ongoing

## 2021-04-23 NOTE — PROGRESS NOTES
Speech Language Pathology  Dysphagia Treatment Note    Name: Stephenie Cazares  :  10/3/1932  Medical Diagnosis:  Acute on chronic diastolic heart failure (HCC) [I50.33]  Treatment Diagnosis: Oropharyngeal Dysphagia  Pain level: None reported    Current Diet Level: Dysphagia III (Soft and Bite-Sized) with thin liquids with STRICT aspiration precautions; meds in puree. NO straws  Tolerance of Current Diet Level: Per pt's wife report, pt seemingly tolerating current diet with occasional coughing. Assessment of Texture Tolerance:  -Impressions:  Pt seen sitting upright in chair, on 12 L of O2 via HFNC, and eating lunch meal consisting of fish, mashed potatoes, Ensure, and ice-chips. Pt's wife present during session and reported that pt has been seemingly tolerating current diet with occasional coughing. Extensively discussed aspiration precautions (I.e. small bites/sips, consuming smaller meals throughout the day vs. one large one due to fatigue, sitting upright for PO), diet recommendations (I.e. open faced sandwiches if pt wants bread vs. sandwich with 2 slices of bread), and diet options upon d/c (I.e. IDDSI soft & bite sized protocol). Pt and pt's wife v/u. Pt continues to demonstrate prolonged mastication, reduced rotary chew (munch-chew pattern), reduced bolus control, reduced a-propulsion, delayed swallow initiation, and reduced breathing/swallow coordination. Pt seemingly tolerated all liquid and solid presentations without s/s of aspiration. Pt's O2 saturation stayed between 88-90% before, during, and after PO trials; wife reported that MD wants pt's O2 saturation to stay between 88-92%. Recommend continuation of current diet with STRICT aspiration precautions. Will continue to monitor pt's tolerance. Diet and Treatment Recommendations:  Continue Dysphagia III (Soft and Bite-Sized) with thin liquids with STRICT aspiration precautions; meds in puree.  NO straws    Dysphagia Goals  1.) Pt will tolerate recommended diet without s/s of aspiration. (ongoing 4/23/2021)  2.) If clinical symptoms of penetration/aspiration continue to be noted,Pt will tolerate MBS to r/o aspiration and determine appropriate diet/liquid level. (ongoing 4/23/2021)  3.) Pt will tolerate diet advance to least restricted diet, as clinically indicated, with no signs of aspiration. (ongoing 4/23/2021)  4.) Pt will improve oral motor function via bolus control exercises 5/5. (ongoing 4/23/2021)    Plan: Continued daily Dysphagia treatment with goals per plan of care. Patient/Family Education: Education given to the pt, pt's wife, and nurse, who verbalized understanding. Discharge Recommendations:  Pt will benefit from continued skilled Speech Therapy for Dysphagia services, prior to returning home. Timed Code Treatment: 0 minutes    Total Treatment Time: 15 minutes    If patient discharges prior to next session this note will serve as a discharge summary. Lee Alaniz M.S. CCC-SLP  Speech-Language Pathologist    Jose Alberto Beckford, [facility]    Clinician    The speech-language pathologist was present, directed the patient's care, made skilled judgment and was responsible for assessment and treatment.

## 2021-04-23 NOTE — PROGRESS NOTES
Physical Therapy  Facility/Department: [facility]  Daily Treatment Note  NAME: Monae Sims  : 10/3/1932  MRN: [id number]    Date of Service: 2021    Discharge Recommendations:  Monae Sims scored a 17/24 on the AM-PAC short mobility form. Current research shows that an AM-PAC score of 18 or greater is typically associated with a discharge to the patient's home setting. Based on the patient's AM-PAC score and their current functional mobility deficits, it is recommended that the patient have 2-3 sessions per week of Physical Therapy at d/c to increase the patient's independence. At this time, this patient demonstrates the endurance and safety to discharge home with home services and a follow up treatment frequency of 2-3x/wk. Please see assessment section for further patient specific details. HOME HEALTH CARE: LEVEL 4 SICK     - Initial home health evaluation to occur within 24-48 hours, in patient home   - Therapy evaluations in home within 24-48 hours of discharge; including DME and home safety   - Frontload therapy 5 days, then 3x a week   - Therapy to evaluate if patient has [address] needs for personal care   -  evaluation within 24-48 hours, includes evaluation of resources and insurance to determine [place], [place], LTC, and Medicaid options    If patient discharges prior to next session this note will serve as a discharge summary. Please see below for the latest assessment towards goals. S Level 4 ; 24 hour supervision  PT Equipment Recommendations  Mobility Devices: Sherol Sebastián; Wheelchair;Hospital Bed  Walker: Rolling  Wheelchair: Transport Chair  Other: Patient needs hospital bed as patient is aspiration risk and needs frequent respositioning for skin protection and improved pulmonary function. Patient needs transport w/c for primary means of mobility as unable to ambulate due to poor pulmonary function. Needs transport chair as patient would be unable to self propel.  Needs RW for safe transfers to w/c. Assessment   Body structures, Functions, Activity limitations: Decreased functional mobility ; Decreased endurance;Decreased strength;Decreased balance  Assessment: Patient not at baseline function and would benefit from skilled PT to address above deficits and facilitate return to baseline function. Patient signficantly limited by respiratory status. Patient needed decreased assistance this date compared to previous session. Discussed impotance of taking time and rest breaks as patient tends to want to rush through transfer which could be unsafe. Treatment Diagnosis: decreased functional mobility, decreased endurance  Clinical Presentation: evolving  PT Education: Goals;PT Role;Plan of Care;Family Education;Equipment  Patient Education: Significant time spend on education on energy conservation, DME needs and safety in home with spouse and patient. All questions answered. -Spouse verbalized understanding (patient unable to verbalize understanding due to bipap) 4/23: educated spouse on transfer training and safety at home, educated patient and spouse on importance of taking rest breaks (ie resting on edge of bed to recover prior to transferring to chair) and importance of allowing patient to do as much as he can for himself to maintain strength and mobility and to protect skin/joints etc.  Barriers to Learning: none  REQUIRES PT FOLLOW UP: Yes  Activity Tolerance  Activity Tolerance: Patient limited by endurance  Activity Tolerance: limited by anxiety     Patient Diagnosis(es): The primary encounter diagnosis was COPD exacerbation ([address] Diagnoses of Acute on chronic congestive heart failure, unspecified heart failure type ([address] and Acute on chronic respiratory failure with hypoxia and hypercapnia (HCC) were also pertinent to this visit.      has a past medical history of Arthritis, Emphysema of lung ([address] Full dentures, Hearing aid worn, Hypertension, On home oxygen therapy, Pulmonary fibrosis ([facility] [address] and Sleep apnea. 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 Restrictions  Restrictions/Precautions  Restrictions/Precautions: Fall Risk  Required Braces or Orthoses?: No  Position Activity Restriction  Other position/activity restrictions: Niko Collier is a 80 y.o. male who presents to the emergency department today for evaluation for shortness of breath. The patient has a history of COPD/emphysema, history of pulmonary fibrosis. The patient is followed by Dr. Johnathan Marmolejo, pulmonology. The patient states that since Friday he has had increasing cough, and increasing shortness of breath. The wife is at bedside and she states that the cough has been productive of thick mucus, and so she states that she started him on doxycycline as prescribed by Dr. Naomi Michele. She states that she also started him on the prednisone. She states that the patient's shortness of breath has been worse over the past 3 days, when patient arrived to the ED he is on a nonrebreather, and O2 sats are 89% on room air the patient denies any chest pain. He is not had any fever or chills. Is not had any abdominal pain, nausea, vomiting or diarrhea. No urinary symptoms. Patient has not had any congestion, he states that he has been vaccinated for COVID-19. He states that he has noticed some lower leg edema but does not feel that he has gained any weight, and the patient otherwise has no complaints at this time. Subjective   General  Chart Reviewed: Yes  Family / Caregiver Present: Yes(spouse)  Subjective  Subjective: Denied pain. Agreeable to therapy, wanting to get into chair. General Comment  Comments: supine in bed upon arrival. On Bipap.  Nursing approval obtained prior to mobility Orientation  Orientation  Overall Orientation Status: Within Functional Limits  Cognition      Objective   Bed mobility  Supine to Sit: Minimal assistance(max cues to attempt without physical assistance)  Scooting: Minimal assistance  Comment: Reported LLE pain \"cramping\" edge of bed  Transfers  Sit to Stand: Contact guard assistance  Stand to sit: Contact guard assistance  Bed to Chair: Contact guard assistance  Comment: Patient anxious with LLE, standing impulsively to attempt to allieviate pain. Small posterior LOB with min A to correct. Able to take 3-4 small steps over to chair with UE support on PTs arms (PT anterior to patient). Spo2 86% on Bipap, recovered ~4-5 minutes (nursing present and aware). Spouse placed patient on nasal cannula (12L) - SpO2 94% on 12 L O2 upon departure. Alerted nursing. Balance  Sitting - Static: Good  Sitting - Dynamic: Fair  Standing - Static: Fair  Standing - Dynamic: Fair                  AM-PAC Score  AM-PAC Inpatient Mobility Raw Score : 17 (04/23/21 1506)  AM-PAC Inpatient T-Scale Score : 42.13 (04/23/21 1506)  Mobility Inpatient CMS 0-100% Score: 50.57 (04/23/21 1506)  Mobility Inpatient CMS G-Code Modifier : CK (04/23/21 1506)          Goals  Short term goals  Time Frame for Short term goals: To be met prior to discharge  Short term goal 1: Bed mobility with supervision  Short term goal 2: Sit to/from stand with supervision  Short term goal 3: Ambulate 10 feet with RW and CGA  Short term goal 4: Bed to/from chair with CGA - MET 4/23  Patient Goals   Patient goals : To be present for family reuinion in June [address]  Times per week: 3-5  Times per day: Daily  Current Treatment Recommendations: Strengthening, Balance Training, Functional Mobility Training, Transfer Training, Endurance Training, Gait Training, Safety Education & Training, Home Exercise Program  Safety Devices  Type of devices:  All fall risk precautions in place, Call light within reach, Nurse notified, Left in chair, Chair alarm in place  Restraints  Initially in place: No     Therapy Time   Individual Concurrent Group Co-treatment   Time In 1042         Time Out 1120         Minutes 38         Timed Code Treatment Minutes: [address] Parisi, PT     Thanks, Graciela Benson, PT, DPT [id number]

## 2021-04-23 NOTE — PLAN OF CARE
Problem: Falls - Risk of:  Goal: Will remain free from falls  Description: Will remain free from falls  Outcome: Met This Shift  Note: Does not attempt to get out of bed or chair without assistance. Problem: OXYGENATION/RESPIRATORY FUNCTION  Goal: Patient will maintain patent airway  Outcome: Met This Shift  Note: Able to decrease oxygen on BiPAP to 65% and to decrease the high flow nasal cannula to 10 lpm.     Problem: Falls - Risk of:  Goal: Absence of physical injury  Description: Absence of physical injury  Outcome: Ongoing     Problem: OXYGENATION/RESPIRATORY FUNCTION  Goal: Patient will achieve/maintain normal respiratory rate/effort  Description: Respiratory rate and effort will be within normal limits for the patient  Outcome: Ongoing     Problem: HEMODYNAMIC STATUS  Goal: Patient has stable vital signs and fluid balance  Outcome: Ongoing     Problem: FLUID AND ELECTROLYTE IMBALANCE  Goal: Fluid and electrolyte balance are achieved/maintained  Outcome: Ongoing     Problem: ACTIVITY INTOLERANCE/IMPAIRED MOBILITY  Goal: Mobility/activity is maintained at optimum level for patient  Outcome: Ongoing     Problem: Skin Integrity:  Goal: Will show no infection signs and symptoms  Description: Will show no infection signs and symptoms  Outcome: Ongoing  Goal: Absence of new skin breakdown  Description: Absence of new skin breakdown  Outcome: Ongoing     Problem: Nutrition  Goal: Optimal nutrition therapy  4/22/2021 2210 by Fidencio Jacobson RN  Outcome: Ongoing  4/22/2021 1111 by Frank Baig RD, CNSC, LD  Outcome: Ongoing     Problem: Pain:  Description: Pain management should include both nonpharmacologic and pharmacologic interventions.   Goal: Pain level will decrease  Description: Pain level will decrease  Outcome: Ongoing  Goal: Control of acute pain  Description: Control of acute pain  Outcome: Ongoing  Goal: Control of chronic pain  Description: Control of chronic pain  Outcome: Ongoing     Problem:

## 2021-04-23 NOTE — PROGRESS NOTES
Message placed to Hospitalist MD, Dr. Donna Perales, regarding critical BUN value of 92. No new orders at this time.        Electronically signed by Ant Morrison RN on 4/23/2021 at 10:49 AM

## 2021-04-23 NOTE — PROGRESS NOTES
[facility]   Progress Note  CHF/Pulmonary Hypertension Cardiology    Chief complaint: We are following this patient for acute on chronic diastolic HF, pulmonary fibrosis, pulmonary hypertension  HPI:  Nitish Woodward is an 79 yo male who presents to the ED for evaluation of shortness of breath. She has a history of COPD/emphysema, history of pulmonary fibrosis. He is followed by Dr. Raymond Bonilla. He came to the ED with increasing cough and increasing shortness of breath. His wife is at bedside. The cough has rossana productive of thick mucus. He was started on doxycycline but this didn't help. He has been on prednisone for some time. The patient's shortness of breath was worse over the past 3 days. Denies chest pain. No fever or chills. No abdominal pain, nausea, vomiting, or diarrhea. No urinary symptoms. He has received COVID vaccine. He has had some lower leg edema. He has not gained any weight.       He has a history of diastolic HF followed by Dr. Callum Rogers for SVT, NSVT, and atrial fibrillation. .       He chronically wears oxygen at 9 liters.       His symptoms were present at rest.  The patient's goals are to get to a family celebration in June.     We are consulted for pulmonary hypertension and fluid overload.     Labs:  Sodium 145  K 3.9  BUN/Cre 64/1.5  Albumin 3.1  H/H 11.6/36.9  WBC 10.1  CXR:  4/20/21:  Impression   Persistent pulmonary edema pattern with asymmetric airspace disease in the   lung bases greater on the left with pleural effusion.  This is superimposed   upon interstitial lung disease and emphysema as best demonstrated on recent   CT.      Echo:  4/19/21:   -Normal left ventricle size, wall thickness, and systolic function with an   estimated ejection fraction of 55-60%.    -No regional wall motion abnormalities are seen.   -Severe biatrial enlargement noted.   -There is mild-to-moderate mitral regurgitation.   -There is severe tricuspid regurgitation.   -Diastolic dysfunction is present however, indeterminate grade.   -Estimated pulmonary artery systolic pressure is severely elevated at 73   mmHg assuming a right atrial pressure of 15 mmHg.     Meds:  Lasix 20 qd  plavix 75 mg po qd  Metoprolol 25 mg po bid  diltiazem 120 mg po qd  Prednisone 20 qd     I/O's negative 2892 cc      ROS:  He has diuresed and is feeling better. He is currently wearing BIPAP and feels better. His legs are much less swollen. Wife is at bedside. She is asking good questions about the difference between palliative care and hospice. They have decided on palliative care on discharge. He was given IV lasix today. Appears dry on labs.     I/O's negative 3400 cc    Medications/Labs all Reviewed    Lab Results   Component Value Date    WBC 10.1 04/21/2021    HGB 11.6 (L) 04/21/2021    HCT 36.9 (L) 04/21/2021    MCV 84.9 04/21/2021     (L) 04/21/2021     Lab Results   Component Value Date    CREATININE 1.5 (H) 04/23/2021    BUN 92 (HH) 04/23/2021     04/23/2021    K 4.2 04/23/2021    CL 98 (L) 04/23/2021    CO2 39 (H) 04/23/2021     Lab Results   Component Value Date    INR 1.11 04/18/2021    PROTIME 12.9 04/18/2021        Physical Examination:    /81   Pulse 90   Temp 97.8 °F (36.6 °C) (Temporal)   Resp 22   Ht 5' 5\" (1.651 m)   Wt 172 lb 6.4 oz (78.2 kg)   SpO2 96%   BMI 28.69 kg/m²      Chronically ill appearing  HEENT:  NC/AT  Respiratory:  · Resp Assessment: Normal respiratory effort  · Resp Auscultation: Clear to auscultation bilaterally   Cardiovascular:  · Auscultation: regular rate and rhythm, normal S1S2, no murmur, rub or gallop  · Palpation:  Nl PMI  · JVP:  < 8 cm h2o  · Extremities: trace bilateral Edema  Abdomen:  · Soft, non-tender  · Normal bowel sounds  Extremities:  ·  No Cyanosis or Clubbing  Neurological/Psychiatric:  · Oriented to time, place, and person  · Non-anxious  Skin Warm and dry    Lab Results   Component Value Date     04/23/2021  04/22/2021     04/21/2021    K 4.2 04/23/2021    K 4.0 04/22/2021    K 3.9 04/21/2021    K 3.9 04/20/2021    K 4.8 01/29/2020    BUN 92 04/23/2021    BUN 72 04/22/2021    BUN 64 04/21/2021    CREATININE 1.5 04/23/2021    CREATININE 1.2 04/22/2021    CREATININE 1.5 04/21/2021    GLUCOSE 371 04/23/2021    GLUCOSE 196 04/22/2021     Lab Results   Component Value Date    PROBNP 2,089 (H) 04/22/2021    PROBNP 1,557 (H) 04/20/2021    PROBNP 2,322 (H) 04/19/2021     Lab Results   Component Value Date    ALT 12 04/21/2021    ALT 9 (L) 04/20/2021    AST 13 (L) 04/21/2021    AST 10 (L) 04/20/2021     Lab Results   Component Value Date    HGB 11.6 04/21/2021    HGB 11.4 04/20/2021    HCT 36.9 04/21/2021    HCT 37.1 04/20/2021     04/21/2021     04/20/2021     Lab Results   Component Value Date    TRIG 77 04/19/2021    TRIG 113 12/04/2020    HDL 47 04/19/2021    HDL 63 12/04/2020    LDLCALC 56 04/19/2021    LDLCALC 81 12/04/2020     Labs were reviewed including labs from other hospital systems through [facility]. Cardiac testing was reviewed including echos, nuclear scans, cardiac catheterization, including from other hospital systems through [facility]. Assessment:    1. COPD exacerbation ([address]    2. Chronic diastolic congestive heart failure, unspecified heart failure type ([address]    3. Acute on chronic respiratory failure with hypoxia and hypercapnia (HCC)     4.   Pulmonary hypertension due to chronic respiratory failure from pulmonary fibrosis. 5/  Severe hypoxemia  6. Anemia, rule out iron deficiency     Plan:  1. The patient is chronically ill from COPD and pulmonary fibrosis  2. Discussions with the patient and wife have led the patient to decide about no intubation or CPR  3. He has diuresed well but is now dry  4. Continue antibiotics per hospitalist  5. Continue low dose metoprolol and diltiazem for Afib and SVT  6.   Continue Plavix  7.  continue spironolactone 12.5 mg po qd for chronic edema and chronic HF  8.  stop IV Lasix, switch to po on discharge  9. Send home on low dose spironolactone. Hold on ACEI or ARB until outpatient. 10.  Start IV Venofer for iron deficiency anemia  11. Likely needs another 48-72 hours before discharge. 9  CHF education reinforced. ~salt restriction  ~fluid restriction  ~medication compliance  ~daily weights and notify of any significant weight gain/loss  ~establish with CHF nurse  ~outpatient follow-up with our CHF team    Discussed with patient, wife, and bedside nurse. IV Venofer  Stop IV Lasix given increase in BUN/Cre    Due to the high probability of clinically significant life threating deterioration of the patient's condition that required my urgent intervention, a total critical care time 35 minutes was used. This time excludes any time that may have been spent performing procedures. This includes but not limited to vital sign monitoring, telemetry monitoring, continuous pulse oximety, IV medication, clinical response to the IV medications, documentation time , consultation time, interpretation of lab data, review of nursing notes and old record review.        NYHA Class: 4    Ashley Cassidy MD, 4/23/2021 2:18 PM

## 2021-04-23 NOTE — PROGRESS NOTES
Base) MCG/ACT inhaler 2 puff, 2 puff, Inhalation, Daily PRN  ipratropium-albuterol (DUONEB) nebulizer solution 1 ampule, 1 ampule, Inhalation, Q4H WA  allopurinol (ZYLOPRIM) tablet 100 mg, 100 mg, Oral, Daily  Arformoterol Tartrate (BROVANA) nebulizer solution 15 mcg, 15 mcg, Nebulization, BID  atorvastatin (LIPITOR) tablet 20 mg, 20 mg, Oral, Nightly  budesonide (PULMICORT) nebulizer suspension 500 mcg, 500 mcg, Nebulization, BID  busPIRone (BUSPAR) tablet 5 mg, 5 mg, Oral, TID  clopidogrel (PLAVIX) tablet 75 mg, 75 mg, Oral, Daily  dilTIAZem (CARDIZEM CD) extended release capsule 120 mg, 120 mg, Oral, Daily  escitalopram (LEXAPRO) tablet 5 mg, 5 mg, Oral, Daily  metoprolol tartrate (LOPRESSOR) tablet 25 mg, 25 mg, Oral, BID  glucose (GLUTOSE) 40 % oral gel 15 g, 15 g, Oral, PRN  dextrose 50 % IV solution, 12.5 g, Intravenous, PRN  glucagon (rDNA) injection 1 mg, 1 mg, Intramuscular, PRN  dextrose 5 % solution, 100 mL/hr, Intravenous, PRN  insulin lispro (1 Unit Dial) 4 Units, 0.05 Units/kg, Subcutaneous, TID WC  sodium chloride flush 0.9 % injection 5-40 mL, 5-40 mL, Intravenous, 2 times per day  sodium chloride flush 0.9 % injection 10 mL, 10 mL, Intravenous, PRN  0.9 % sodium chloride infusion, 25 mL, Intravenous, PRN  promethazine (PHENERGAN) tablet 12.5 mg, 12.5 mg, Oral, Q6H PRN **OR** ondansetron (ZOFRAN) injection 4 mg, 4 mg, Intravenous, Q6H PRN  polyethylene glycol (GLYCOLAX) packet 17 g, 17 g, Oral, Daily PRN  acetaminophen (TYLENOL) tablet 650 mg, 650 mg, Oral, Q6H PRN **OR** acetaminophen (TYLENOL) suppository 650 mg, 650 mg, Rectal, Q6H PRN  enoxaparin (LOVENOX) injection 40 mg, 40 mg, Subcutaneous, Daily    Allergies   Allergen Reactions    Aspirin Hives and Swelling    Dilaudid [Hydromorphone Hcl] Other (See Comments)     Severe hallucination       Social History:    TOBACCO:   reports that he quit smoking about 20 years ago. His smoking use included cigarettes.  He has a 100.00 pack-year smoking history. He has never used smokeless tobacco.  ETOH:   reports current alcohol use of about 8.0 standard drinks of alcohol per week. Patient currently lives independently  Environmental/chemical exposure: None known    Family History:       Problem Relation Age of Onset    Early Death Mother 43        ? MI    Alcohol Abuse Father     Colon Cancer Sister     Emphysema Brother         black lung    Colon Cancer Sister     Cancer Brother         stomach    Cancer Brother         liver    Lung Cancer Brother     Colon Cancer Brother     Kidney Disease Brother     Other Brother         sydenham chorea     REVIEW OF SYSTEMS:    CONSTITUTIONAL:  negative for fevers, chills, diaphoresis, activity change, appetite change, fatigue, night sweats and unexpected weight change. EYES:  negative for blurred vision, eye discharge, visual disturbance and icterus  HEENT:  negative for hearing loss, tinnitus, ear drainage, sinus pressure, nasal congestion, epistaxis and snoring  RESPIRATORY:  See HPI  CARDIOVASCULAR:  negative for chest pain, palpitations, exertional chest pressure/discomfort, edema, syncope  GASTROINTESTINAL:  negative for nausea, vomiting, diarrhea, constipation, blood in stool and abdominal pain  GENITOURINARY:  negative for frequency, dysuria, urinary incontinence, decreased urine volume, and hematuria  HEMATOLOGIC/LYMPHATIC:  negative for easy bruising, bleeding and lymphadenopathy  ALLERGIC/IMMUNOLOGIC:  negative for recurrent infections, angioedema, anaphylaxis and drug reactions  ENDOCRINE:  negative for weight changes and diabetic symptoms including polyuria, polydipsia and polyphagia  MUSCULOSKELETAL:  negative for  pain, joint swelling, decreased range of motion and muscle weakness  NEUROLOGICAL:  negative for headaches, slurred speech, unilateral weakness  PSYCHIATRIC/BEHAVIORAL: negative for hallucinations, behavioral problems, confusion and agitation.      Objective:   PHYSICAL EXAM: VITALS:  /81   Pulse 90   Temp 97.8 °F (36.6 °C) (Temporal)   Resp 22   Ht 5' 5\" (1.651 m)   Wt 172 lb 6.4 oz (78.2 kg)   SpO2 96%   BMI 28.69 kg/m²      24HR INTAKE/OUTPUT:      Intake/Output Summary (Last 24 hours) at 4/23/2021 0244  Last data filed at 4/23/2021 0800  Gross per 24 hour   Intake 1107.62 ml   Output 1400 ml   Net -292.38 ml     CONSTITUTIONAL:  awake, alert, cooperative, no apparent distress, and appears stated age  NECK:  Supple, symmetrical, trachea midline, no adenopathy, thyroid symmetric, not enlarged and no tenderness, skin normal  LUNGS:  no increased work of breathing and basilar crackles to auscultation. No accessory muscle use. He has a congested cough  CARDIOVASCULAR: S1 and S2, no edema and no JVD  ABDOMEN:  normal bowel sounds, non-distended and no masses palpated, and no tenderness to palpation. No hepatospleenomegaly  LYMPHADENOPATHY:  no axillary or supraclavicular adenopathy. No cervical adnenopathy  PSYCHIATRIC: Oriented to person place and time. No obvious depression or anxiety. MUSCULOSKELETAL: No obvious misalignment or effusion of the joints. No clubbing, cyanosis of the digits. SKIN:  normal skin color, texture, turgor and no redness, warmth, or swelling. No palpable nodules    DATA:    Old records have been reviewed    CBC:  Recent Labs     04/21/21  0420   WBC 10.1   RBC 4.34   HGB 11.6*   HCT 36.9*   *   MCV 84.9   MCH 26.6   MCHC 31.3   RDW 18.1*      BMP:  Recent Labs     04/21/21  0420 04/22/21  0535    147*   K 3.9 4.0   CL 99 99   CO2 39* 42*   BUN 64* 72*   CREATININE 1.5* 1.2   CALCIUM 8.4 8.7   GLUCOSE 187* 196*      ABG:  No results for input(s): PHART, FAC8UWZ, PO2ART, BXC2ABA, G4TPCWAR, BEART in the last 72 hours. Procalcitonin  No results for input(s): PROCAL in the last 72 hours.     No results found for: BNP  Lab Results   Component Value Date    TROPONINI <0.01 04/19/2021           Radiology Review:  All pertinent images / reports were reviewed as a part of this visit. Assessment:     1. Acute on chronic hypoxemic and hypercapnic respiratory failure  2. Acute pulmonary edema  3. Severe COPD/centrilobular emphysema  4. Pulmonary fibrosis  5. Acute on chronic diastolic heart failure      Plan:     I have reviewed laboratories, medical records and images for this visit  I/O pretty balanced over the last 24 hours. Received Lasix 40 IV push and spironolactone 12.5 yesterday  May be approaching neutral fluid balance. I turned his oxygen supplement to 10 L/min HFNC. He seemed to tolerate that while we were in the room. He has a concentrator at home that can provide 10 L/min HFNC. Tonight will experiment with using his own BiPAP machine +10 L/min HFNC. This should let us know what his needs are when he is discharged home. Speech has been working with him and he is doing well on his modified diet  He is agreeable to palliative care at the time of discharge.

## 2021-04-23 NOTE — PROGRESS NOTES
Occupational Therapy  Facility/Department: [facility]  Daily Treatment Note  NAME: Jessica Flores  : 10/3/1932  MRN: [id number]    Date of Service: 2021    Discharge Recommendations:    Jessica Flores scored a 14/24 on the AM-PAC ADL Inpatient form. Current research shows that an AM-PAC score of 18 or greater is typically associated with a discharge to the patient's home setting. Based on the patient's AM-PAC score, and their current ADL deficits, it is recommended that the patient have 2-3 sessions per week of Occupational Therapy at d/c to increase the patient's independence. At this time, this patient demonstrates the endurance and safety to discharge home with home services and a follow up treatment frequency of 2-3x/wk. Please see assessment section for further patient specific details. If patient discharges prior to next session this note will serve as a discharge summary. Please see below for the latest assessment towards goals. HOME HEALTH CARE: LEVEL 4 SICK     - Initial home health evaluation to occur within 24-48 hours, in patient home   - Therapy evaluations in home within 24-48 hours of discharge; including DME and home safety   - Frontload therapy 5 days, then 3x a week   - Therapy to evaluate if patient has [address] needs for personal care   -  evaluation within 24-48 hours, includes evaluation of resources and insurance to determine [place], [place], LTC, and Medicaid options  OT Equipment Recommendations  ADL Assistive Devices: Toileting - 3-in-1 Commode  Other: continue to assess    Assessment   Performance deficits / Impairments: Decreased functional mobility ; Decreased ADL status; Decreased strength;Decreased endurance;Decreased balance  Assessment: Pt is currently functioning below occupational baseline and demo the deficits listed above, pt was impulsive during transfer requring max encouragement and education for safety for him and spouse.  Pt 02 dropped down to 88% on bipap during transfer but quickly recovered and was left on 12L. Pt would benefit from continued skilled OT services to address these deficits and increase independence and safety. Treatment Diagnosis: Decreased ADL status, functional mobility, functional transfer, and endurance d/t Acute on chronic diastolic heart failure (HCC)  Prognosis: Fair;Good  OT Education: OT Role;Transfer Training;Energy Conservation; Family Education;Equipment;Precautions  Patient Education: Extensive education provided on DME that can be utilized at home- pt's spouse verbalized understanding  REQUIRES OT FOLLOW UP: Yes  Activity Tolerance  Activity Tolerance: Patient Tolerated treatment well;Patient limited by fatigue  Activity Tolerance: Pt at 94% on bipap upon arrival, after transfer 02 dropped to 88%. Pt quickly recovered 02 up to 100% in a couple minutes. Pt was taken off bipap and put on 12L of 02. Safety Devices  Safety Devices in place: Yes  Type of devices: All fall risk precautions in place; Left in chair;Chair alarm in place;Call light within reach; Patient at risk for falls;Nurse notified         Patient Diagnosis(es): The primary encounter diagnosis was COPD exacerbation ([address] Diagnoses of Acute on chronic congestive heart failure, unspecified heart failure type ([address] and Acute on chronic respiratory failure with hypoxia and hypercapnia (HCC) were also pertinent to this visit. has a past medical history of Arthritis, Emphysema of lung ([address] Full dentures, Hearing aid worn, Hypertension, On home oxygen therapy, Pulmonary fibrosis ([address] and Sleep apnea. 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     Restrictions  Restrictions/Precautions  Restrictions/Precautions: Fall Risk  Required Braces or Orthoses?: No  Position Activity Restriction  Other position/activity restrictions: Nicolasa Oneill is a 80 y.o. male who presents to the emergency department today for evaluation for shortness of breath. The patient has a history of COPD/emphysema, history of pulmonary fibrosis. The patient is followed by Dr. Honey Grande, pulmonology. The patient states that since Friday he has had increasing cough, and increasing shortness of breath. The wife is at bedside and she states that the cough has been productive of thick mucus, and so she states that she started him on doxycycline as prescribed by Dr. Tonny Pineda. She states that she also started him on the prednisone. She states that the patient's shortness of breath has been worse over the past 3 days, when patient arrived to the ED he is on a nonrebreather, and O2 sats are 89% on room air the patient denies any chest pain. He is not had any fever or chills. Is not had any abdominal pain, nausea, vomiting or diarrhea. No urinary symptoms. Patient has not had any congestion, he states that he has been vaccinated for COVID-19. He states that he has noticed some lower leg edema but does not feel that he has gained any weight, and the patient otherwise has no complaints at this time.   Subjective   General  Chart Reviewed: Yes  Patient assessed for rehabilitation services?: Yes  Additional Pertinent Hx: PMH:  severe COPD, pulmonary fibrosis, chronic respiratory failure with hypoxia and hypercapnia (10 L/min supplemental oxygen at baseline), severe pulmonary hypertension, obstructive sleep apnea, chronic diastolic CHF, chronic steroid use (prednisone 20 mg daily), obesity with BMI of 31 kg/m²  Response to previous treatment: Patient with no complaints from previous session  Family / Caregiver Present: Yes(Wife present)  Referring Practitioner: Lenore Shrestha MD  Diagnosis: Acute on chronic diastolic heart failure ([address]  Subjective  Subjective: Pt supine in bed upon arrival, agreeable to treatment, pt denies pain. Pre Treatment Pain Screening  Intervention List: Patient able to continue with treatment;Nurse/Physician notified   Orientation  Orientation  Overall Orientation Status: Within Functional Limits  Objective    ADL  Additional Comments: Pt declined all ADL's, spouse states at baseline she provides at least supervision for all ADLs; Physically assists with LB dressing and bathing. Balance  Sitting Balance: Contact guard assistance  Standing Balance: Contact guard assistance  Standing Balance  Time: ~3-4 seconds  Activity: Transfer EOB>recliner  Functional Mobility  Functional Mobility Comments: functional mobility not assessed this date, pt on Bipap  Bed mobility  Rolling to Left: Minimal assistance(Verbal cues for hand placement)  Supine to Sit: Minimal assistance(Posterior lean)  Scooting: Minimal assistance  Comment: Supine/sit pt claimed to have excessive L LE pain. Pt skin  very sensative to touch. Transfers  Stand Pivot Transfers: Minimal assistance  Sit to stand: Minimal assistance  Stand to sit: Minimal assistance  Transfer Comments: Pt had a LOB during transfer and needed Min A to recover. Pt was impulsive and needed max encouragement for safe transfer. Pt and spouse given extensive education on safe transfer techniques.       Cognition  Overall Cognitive Status: WFL     Perception  Overall Perceptual Status: WFL     Plan   Plan  Times per week: 3-5x/wk  Times per day: Daily  Current Treatment Recommendations: Functional Mobility Training, Endurance Training, Safety Education & Training, Patient/Caregiver Education & Training, Equipment Evaluation, Education, & procurement, Self-Care / ADL, Strengthening, Balance Training    AM-PAC Score        AM-[facility] Inpatient Daily Activity Raw Score: 14 (04/23/21 1203)  AM-PAC Inpatient ADL T-Scale Score : 33.39 (04/23/21 1203)  ADL Inpatient CMS 0-100% Score: 59.67 (04/23/21 1203)  ADL Inpatient CMS G-Code Modifier : CK (04/23/21 1203)    Goals  Short term goals  Time Frame for Short term goals: d/c  Short term goal 1: pt will complete functional transfer to ADL surface with CGA--- Min A EOB>recliner 4/23  Short term goal 2: pt will complete UB ADLs with setup--- Not addressed pt declined 4/26  Short term goal 3: Pt will complete toileting with CGA--- Not addressed 4/23  Short term goal 4: Pt will complete bed mobility with SBA--- Min A 4/23  Long term goals  Time Frame for Long term goals : LTG=STG  Patient Goals   Patient goals : to return home and get better to go to family reunion this June       Therapy Time   Individual Concurrent Group Co-treatment   Time In       1042   Time Out       1120   Minutes       38      Timed Code Treatment Minutes:  38 Minutes    Total Treatment Minutes:  [address] [facility] S/REFUGIO     C/ [address], OT   I have directly observed this treatment and have read and approve this note.    Mary Kay Torres., OTR/L, [id number]

## 2021-04-24 NOTE — PROGRESS NOTES
(LIPITOR) tablet 20 mg, 20 mg, Oral, Nightly  budesonide (PULMICORT) nebulizer suspension 500 mcg, 500 mcg, Nebulization, BID  busPIRone (BUSPAR) tablet 5 mg, 5 mg, Oral, TID  clopidogrel (PLAVIX) tablet 75 mg, 75 mg, Oral, Daily  dilTIAZem (CARDIZEM CD) extended release capsule 120 mg, 120 mg, Oral, Daily  escitalopram (LEXAPRO) tablet 5 mg, 5 mg, Oral, Daily  metoprolol tartrate (LOPRESSOR) tablet 25 mg, 25 mg, Oral, BID  glucose (GLUTOSE) 40 % oral gel 15 g, 15 g, Oral, PRN  dextrose 50 % IV solution, 12.5 g, Intravenous, PRN  glucagon (rDNA) injection 1 mg, 1 mg, Intramuscular, PRN  dextrose 5 % solution, 100 mL/hr, Intravenous, PRN  insulin lispro (1 Unit Dial) 4 Units, 0.05 Units/kg, Subcutaneous, TID WC  sodium chloride flush 0.9 % injection 5-40 mL, 5-40 mL, Intravenous, 2 times per day  sodium chloride flush 0.9 % injection 10 mL, 10 mL, Intravenous, PRN  0.9 % sodium chloride infusion, 25 mL, Intravenous, PRN  promethazine (PHENERGAN) tablet 12.5 mg, 12.5 mg, Oral, Q6H PRN **OR** ondansetron (ZOFRAN) injection 4 mg, 4 mg, Intravenous, Q6H PRN  acetaminophen (TYLENOL) tablet 650 mg, 650 mg, Oral, Q6H PRN **OR** acetaminophen (TYLENOL) suppository 650 mg, 650 mg, Rectal, Q6H PRN  enoxaparin (LOVENOX) injection 40 mg, 40 mg, Subcutaneous, Daily    Allergies   Allergen Reactions    Aspirin Hives and Swelling    Dilaudid [Hydromorphone Hcl] Other (See Comments)     Severe hallucination       Social History:    TOBACCO:   reports that he quit smoking about 20 years ago. His smoking use included cigarettes. He has a 100.00 pack-year smoking history. He has never used smokeless tobacco.  ETOH:   reports current alcohol use of about 8.0 standard drinks of alcohol per week. Patient currently lives independently  Environmental/chemical exposure: None known    Family History:       Problem Relation Age of Onset    Early Death Mother 43        ?  MI    Alcohol Abuse Father     Colon Cancer Sister     Emphysema Brother         black lung    Colon Cancer Sister     Cancer Brother         stomach    Cancer Brother         liver    Lung Cancer Brother     Colon Cancer Brother     Kidney Disease Brother     Other Brother         sydenham chorea     REVIEW OF SYSTEMS:    CONSTITUTIONAL:  negative for fevers, chills, diaphoresis, activity change, appetite change, fatigue, night sweats and unexpected weight change. EYES:  negative for blurred vision, eye discharge, visual disturbance and icterus  HEENT:  negative for hearing loss, tinnitus, ear drainage, sinus pressure, nasal congestion, epistaxis and snoring  RESPIRATORY:  See HPI  CARDIOVASCULAR:  negative for chest pain, palpitations, exertional chest pressure/discomfort, edema, syncope  GASTROINTESTINAL:  negative for nausea, vomiting, diarrhea, constipation, blood in stool and abdominal pain  GENITOURINARY:  negative for frequency, dysuria, urinary incontinence, decreased urine volume, and hematuria  HEMATOLOGIC/LYMPHATIC:  negative for easy bruising, bleeding and lymphadenopathy  ALLERGIC/IMMUNOLOGIC:  negative for recurrent infections, angioedema, anaphylaxis and drug reactions  ENDOCRINE:  negative for weight changes and diabetic symptoms including polyuria, polydipsia and polyphagia  MUSCULOSKELETAL:  negative for  pain, joint swelling, decreased range of motion and muscle weakness  NEUROLOGICAL:  negative for headaches, slurred speech, unilateral weakness  PSYCHIATRIC/BEHAVIORAL: negative for hallucinations, behavioral problems, confusion and agitation.      Objective:   PHYSICAL EXAM:      VITALS:  /77   Pulse 87   Temp 97.1 °F (36.2 °C) (Temporal)   Resp (!) 33   Ht 5' 5\" (1.651 m)   Wt 171 lb 4.8 oz (77.7 kg)   SpO2 92%   BMI 28.51 kg/m²      24HR INTAKE/OUTPUT:      Intake/Output Summary (Last 24 hours) at 4/24/2021 1518  Last data filed at 4/24/2021 1400  Gross per 24 hour   Intake 440.8 ml   Output 1675 ml   Net -1234.2 ml     CONSTITUTIONAL: awake, alert, cooperative, no apparent distress, and appears stated age  NECK:  Supple, symmetrical, trachea midline, no adenopathy, thyroid symmetric, not enlarged and no tenderness, skin normal  LUNGS:  no increased work of breathing and basilar crackles to auscultation. No accessory muscle use. He has a congested cough  CARDIOVASCULAR: S1 and S2, no edema and no JVD  ABDOMEN:  normal bowel sounds, non-distended and no masses palpated, and no tenderness to palpation. No hepatospleenomegaly  LYMPHADENOPATHY:  no axillary or supraclavicular adenopathy. No cervical adnenopathy  PSYCHIATRIC: Oriented to person place and time. No obvious depression or anxiety. MUSCULOSKELETAL: No obvious misalignment or effusion of the joints. No clubbing, cyanosis of the digits. SKIN:  normal skin color, texture, turgor and no redness, warmth, or swelling. No palpable nodules    DATA:    Old records have been reviewed    CBC:  No results for input(s): WBC, RBC, HGB, HCT, PLT, MCV, MCH, MCHC, RDW, NRBC, SEGSPCT, BANDSPCT in the last 72 hours. BMP:  Recent Labs     04/22/21  0535 04/23/21  0955 04/24/21  0612   * 143 144   K 4.0 4.2 4.2   CL 99 98* 99   CO2 42* 39* 40*   BUN 72* 92* 91*   CREATININE 1.2 1.5* 1.3   CALCIUM 8.7 8.3 8.4   GLUCOSE 196* 371* 220*      ABG:  No results for input(s): PHART, KBD4ROT, PO2ART, UDK3NXV, C3SYVKTK, BEART in the last 72 hours. Procalcitonin  No results for input(s): PROCAL in the last 72 hours. No results found for: BNP  Lab Results   Component Value Date    TROPONINI <0.01 04/19/2021           Radiology Review:  All pertinent images / reports were reviewed as a part of this visit. Assessment:     1. Acute on chronic hypoxemic and hypercapnic respiratory failure  2. Acute pulmonary edema  3. Severe COPD/centrilobular emphysema  4. Pulmonary fibrosis  5.  Acute on chronic diastolic heart failure      Plan:     I have reviewed laboratories, medical records and images for this visit  Patient's respiratory status continues to improve. Now on 10 L/min via high flow nasal cannula. Does have oxygen concentrator at home which can provide him with this level of oxygen. Received Lasix 40 IV push and spironolactone 12.5 yesterday. Does not appear fluid overloaded now. Overnight requiring BiPAP therapy at 18/8 cmH2O. I interrogated his home BiPAP machine and change the pressure settings accordingly. He will use his home BiPAP machine again tonight. Start the patient on bowel regimen to reduce any constipation. Speech has been working with him and he is doing well on his modified diet  He is agreeable to palliative care at the time of discharge. Should be able to get discharged back home on Monday morning.           Jesus Alberto Grayson MD   Pulmonary Critical Care and Sleep Medicine  [address], [address]  4/18/2021, 3:19 PM

## 2021-04-24 NOTE — PROGRESS NOTES
Pt placed on home unit bipap with 15L attached. Pt desat to 86-88%. Pt placed back on v60 18/8 55% saturations above 92%.

## 2021-04-24 NOTE — PLAN OF CARE
Problem: Falls - Risk of:  Goal: Will remain free from falls  Outcome: Met This Shift  Goal: Absence of physical injury  Outcome: Met This Shift     Problem: OXYGENATION/RESPIRATORY FUNCTION  Goal: Patient will maintain patent airway  Outcome: Met This Shift  Goal: Patient will achieve/maintain normal respiratory rate/effort  Outcome: Met This Shift     Problem: HEMODYNAMIC STATUS  Goal: Patient has stable vital signs and fluid balance  Outcome: Met This Shift     Problem: FLUID AND ELECTROLYTE IMBALANCE  Goal: Fluid and electrolyte balance are achieved/maintained  Outcome: Met This Shift     Problem: ACTIVITY INTOLERANCE/IMPAIRED MOBILITY  Goal: Mobility/activity is maintained at optimum level for patient  Outcome: Met This Shift     Problem: Skin Integrity:  Goal: Will show no infection signs and symptoms  Outcome: Met This Shift  Goal: Absence of new skin breakdown  Outcome: Met This Shift     Problem: Nutrition  Goal: Optimal nutrition therapy  Outcome: Ongoing     Problem: Musculor/Skeletal Functional Status  Goal: Highest potential functional level  Outcome: Met This Shift  Goal: Absence of falls  Outcome: Met This Shift     Problem: Pain:  Goal: Pain level will decrease  Outcome: Met This Shift

## 2021-04-24 NOTE — PROGRESS NOTES
1.3 04/24/2021    CREATININE 1.5 04/23/2021    CREATININE 1.2 04/22/2021    GLUCOSE 220 04/24/2021    GLUCOSE 371 04/23/2021     Lab Results   Component Value Date    PROBNP 1,665 (H) 04/24/2021    PROBNP 2,089 (H) 04/22/2021    PROBNP 1,557 (H) 04/20/2021     Lab Results   Component Value Date    ALT 12 04/21/2021    ALT 9 (L) 04/20/2021    AST 13 (L) 04/21/2021    AST 10 (L) 04/20/2021     Lab Results   Component Value Date    HGB 11.6 04/21/2021    HGB 11.4 04/20/2021    HCT 36.9 04/21/2021    HCT 37.1 04/20/2021     04/21/2021     04/20/2021     Lab Results   Component Value Date    TRIG 77 04/19/2021    TRIG 113 12/04/2020    HDL 47 04/19/2021    HDL 63 12/04/2020    LDLCALC 56 04/19/2021    LDLCALC 81 12/04/2020     Labs were reviewed including labs from other hospital systems through [facility]. Cardiac testing was reviewed including echos, nuclear scans, cardiac catheterization, including from other hospital systems through [facility]. Assessment:    1. COPD exacerbation ([address]    2. Chronic diastolic congestive heart failure, unspecified heart failure type ([address]    3. Acute on chronic respiratory failure with hypoxia and hypercapnia (HCC)     4.   Pulmonary hypertension due to chronic respiratory failure from pulmonary fibrosis. 5/  Severe hypoxemia  6. Anemia, rule out iron deficiency     Plan:  1. Hold diuresis  2. Start IV Venofer for iron deficiency anemia  3. Continue metoprolol and diltiazem  4.  Not on systemic anticoagulation due to anemia and thrombocytopenia with risk of bleeding    Chest xray personally interpreted  EKG personally interpreted  Telemetry, labs, old notes personally interpreted  Discussed with nursing  Moderate complexity  Moderate risk    ~fluid restriction  ~medication compliance  ~daily weights and notify of any significant weight gain/loss  ~establish with CHF nurse  ~outpatient follow-up with our CHF team    Discussed with patient, wife, and bedside nurse.   IV Venofer        NYHA Class: [id number] Moran DO, 4/24/2021 6:07 PM

## 2021-04-24 NOTE — PLAN OF CARE
PT will remain free from falls by using the call light, wearing  socks, and utilizing staff assistance before ambulation

## 2021-04-24 NOTE — PROGRESS NOTES
Nutrition Education    Provided heart failure diet education. Education included low sodium diet guidelines (3-4 gm Na+/day) and fluid restriction (64 oz/day). Reviewed foods to choose and foods to avoid, along with label reading and ways to add flavor to food. Pt does not currently follow a low sodium diet at home. Pt states understanding of education. Time spent with patient and spouse: 10 minutes.      Electronically signed by Malathi Wolf RD, PANFILO on 4/24/21 at 10:46 AM EDT    Contact: [phone]

## 2021-04-24 NOTE — PROGRESS NOTES
Hospitalist Progress Note      PCP: Abi Leal MD    Date of Admission: 4/18/2021    Chief Complaint: Shortness of breath    Hospital Course:      80 y.o. male with history of severe COPD, pulmonary fibrosis, chronic respiratory failure with hypoxia and hypercapnia (10 L/min supplemental oxygen at baseline), severe pulmonary hypertension, obstructive sleep apnea, chronic diastolic CHF, chronic steroid use (prednisone 20 mg daily), obesity with BMI of 31 kg/m², who was recently evaluated by primary care physician within the past 2 weeks for ongoing cough, shortness of breath, sputum production, recently placed on steroid taper and doxycycline, who presents to the emergency room with complaints of worsening cough, shortness of breath, over the last 2 days. He does not have fever or chills. He has had progressive worsening of shortness of breath over the last 6 months. He was noted to be saturating 87% on 11 L via nasal cannula, transitioned to 100% supplemental oxygen via nonrebreather and had to be transitioned to BiPAP in the emergency room. Of note, patient has been vaccinated for COVID-19. Subjective:   Currently on BiPAP with FiO2 55%  proBNP on admission 2458  Has been worsening in the last 1 to 2 weeks  His baseline is 10 L/min nasal cannula of oxygen  Pulmonary fibrosis and pulmonary hypertension  PCP has been discussing hospice option with family, wife  Currently on Lasix and BiPAP pending echo  Had multiple doses of doxycycline and prednisone recently with PCP and pulmonary outpatient      4/21  Today he is off bipap and tolerating this well.     4/22  Patient is back on BiPAP today he has been taken off of the Lasix drip  He is diuresing better with IV push diuretics  The patient is comfortable on the BiPAP    4/23  On and off BiPAP and doing well  For him      Medications:  Reviewed    Infusion Medications    dextrose      sodium chloride 25 mL (04/24/21 9004)     Scheduled Medications    polyethylene glycol  17 g Oral Every Other Day    insulin glargine  25 Units Subcutaneous Nightly    iron sucrose (VENOFER) iv piggyback 100 mL (Admin over 60 minutes)  200 mg Intravenous Q24H    cefepime  2,000 mg Intravenous 2 times per day    methylPREDNISolone  40 mg Intravenous Q12H    spironolactone  12.5 mg Oral Daily    insulin lispro  0-12 Units Subcutaneous TID WC    insulin lispro  0-6 Units Subcutaneous Nightly    ipratropium-albuterol  1 ampule Inhalation Q4H WA    allopurinol  100 mg Oral Daily    Arformoterol Tartrate  15 mcg Nebulization BID    atorvastatin  20 mg Oral Nightly    budesonide  500 mcg Nebulization BID    busPIRone  5 mg Oral TID    clopidogrel  75 mg Oral Daily    dilTIAZem  120 mg Oral Daily    escitalopram  5 mg Oral Daily    metoprolol tartrate  25 mg Oral BID    insulin lispro  0.05 Units/kg Subcutaneous TID     sodium chloride flush  5-40 mL Intravenous 2 times per day    enoxaparin  40 mg Subcutaneous Daily     PRN Meds: potassium chloride **OR** potassium alternative oral replacement **OR** potassium chloride, albuterol sulfate HFA, glucose, dextrose, glucagon (rDNA), dextrose, sodium chloride flush, sodium chloride, promethazine **OR** ondansetron, acetaminophen **OR** acetaminophen      Intake/Output Summary (Last 24 hours) at 4/24/2021 1443  Last data filed at 4/24/2021 1400  Gross per 24 hour   Intake 440.8 ml   Output 1675 ml   Net -1234.2 ml       Physical Exam Performed:    /77   Pulse 87   Temp 97.1 °F (36.2 °C) (Temporal)   Resp (!) 33   Ht 5' 5\" (1.651 m)   Wt 171 lb 4.8 oz (77.7 kg)   SpO2 92%   BMI 28.51 kg/m²     General appearance: Currently on BiPAP, in no acute respiratory distress  HEENT: Pupils equal, round, and reactive to light. Conjunctivae/corneas clear. Neck: Supple, with full range of motion. No jugular venous distention. Trachea midline. Respiratory:  Normal respiratory effort.  Clear to auscultation, bilaterally without Rales/Wheezes/Rhonchi. Cardiovascular: Regular rate and rhythm with normal S1/S2 without murmurs, rubs or gallops. Abdomen: Soft, non-tender, non-distended with normal bowel sounds. Musculoskeletal: No clubbing, cyanosis or edema bilaterally. Full range of motion without deformity. Skin: Skin color, texture, turgor normal.  No rashes or lesions. Neurologic:  Neurovascularly intact without any focal sensory/motor deficits. Cranial nerves: II-XII intact, grossly non-focal.  Psychiatric: Alert, awake and following commands. Capillary Refill: Brisk,3 seconds, normal   Peripheral Pulses: +2 palpable, equal bilaterally       Labs:   No results for input(s): WBC, HGB, HCT, PLT in the last 72 hours. Recent Labs     04/22/21  0535 04/23/21  0955 04/24/21  0612   * 143 144   K 4.0 4.2 4.2   CL 99 98* 99   CO2 42* 39* 40*   BUN 72* 92* 91*   CREATININE 1.2 1.5* 1.3   CALCIUM 8.7 8.3 8.4     No results for input(s): AST, ALT, BILIDIR, BILITOT, ALKPHOS in the last 72 hours. No results for input(s): INR in the last 72 hours. No results for input(s): Nolvia Grumet in the last 72 hours. Urinalysis:      Lab Results   Component Value Date    NITRU Negative 04/19/2021    WBCUA 1 04/19/2021    BACTERIA 1+ 07/22/2017    RBCUA 1 04/19/2021    BLOODU Negative 04/19/2021    SPECGRAV 1.017 04/19/2021    GLUCOSEU Negative 04/19/2021       Radiology:  XR CHEST PORTABLE   Final Result   Persistent pleural-parenchymal disease bilaterally. No significant change         XR CHEST PORTABLE   Final Result   Persistent pulmonary edema pattern with asymmetric airspace disease in the   lung bases greater on the left with pleural effusion. This is superimposed   upon interstitial lung disease and emphysema as best demonstrated on recent   CT. CT CHEST WO CONTRAST   Final Result   Bilateral lower lobe airspace disease suspicious for pneumonia, with small   right pleural effusion.   This is occurring currently on BiPAP with good saturation. They discussed with pulm today and are [facility]  Will go home with palliative care on Monday now. Troponinemia, echocardiogram is pending, troponin with non-ACS trend    DVT Prophylaxis: Lovenox  Diet: Dietary Nutrition Supplements: Low Calorie High Protein Supplement  DIET LOW SODIUM 2 GM; Carb Control: 5 carb choices (75 gms)/meal; Dysphagia Soft and Bite-Sized; 2000 ml;  No Drinking Straw  Dietary Nutrition Supplements: Other Oral Supplement (see comment)  Code Status: DNR-CCA    PT/OT Eval Status: When appropriate    Dispo -continue ICU  Level of care        Alonzo Roque MD

## 2021-04-24 NOTE — PROGRESS NOTES
Hospitalist Progress Note      PCP: Judson Salas MD    Date of Admission: 4/18/2021    Chief Complaint: Shortness of breath    Hospital Course:      80 y.o. male with history of severe COPD, pulmonary fibrosis, chronic respiratory failure with hypoxia and hypercapnia (10 L/min supplemental oxygen at baseline), severe pulmonary hypertension, obstructive sleep apnea, chronic diastolic CHF, chronic steroid use (prednisone 20 mg daily), obesity with BMI of 31 kg/m², who was recently evaluated by primary care physician within the past 2 weeks for ongoing cough, shortness of breath, sputum production, recently placed on steroid taper and doxycycline, who presents to the emergency room with complaints of worsening cough, shortness of breath, over the last 2 days. He does not have fever or chills. He has had progressive worsening of shortness of breath over the last 6 months. He was noted to be saturating 87% on 11 L via nasal cannula, transitioned to 100% supplemental oxygen via nonrebreather and had to be transitioned to BiPAP in the emergency room. Of note, patient has been vaccinated for COVID-19. Subjective:   Currently on BiPAP with FiO2 55%  proBNP on admission 2458  Has been worsening in the last 1 to 2 weeks  His baseline is 10 L/min nasal cannula of oxygen  Pulmonary fibrosis and pulmonary hypertension  PCP has been discussing hospice option with family, wife  Currently on Lasix and BiPAP pending echo  Had multiple doses of doxycycline and prednisone recently with PCP and pulmonary outpatient      4/21  Today he is off bipap and tolerating this well.     4/22  Patient is back on BiPAP today he has been taken off of the Lasix drip  He is diuresing better with IV push diuretics  The patient is comfortable on the BiPAP    Medications:  Reviewed    Infusion Medications    dextrose      sodium chloride Stopped (04/24/21 0922)     Scheduled Medications    polyethylene glycol  17 g Oral Every Other fibrosis with chronic left pleural thickening. Interval mediastinal lymph node enlargement is likely reactive but could be   reassessed with chest CT when the patient is clinically baseline         XR CHEST PORTABLE   Final Result   Extensive bilateral airspace opacities, which may be related to multifocal   pneumonia or pulmonary edema. Small to moderate bilateral pleural effusions. Suspected cardiomegaly. Assessment/Plan:    Active Hospital Problems    Diagnosis    Chronic diastolic heart failure (HCC) [I50.32]     Acute on chronic respiratory failure with hypoxia and hypercapnia  Pulmonary fibrosis  Pulmonary hypertension  Acute on chronic diastolic congestive heart failure  History of COPD  Uncontrolled diabetes type 2 with hyperglycemia  Troponinemia  History of paroxysmal atrial fibrillation  Mild hyperkalemia  Chronic respiratory failure with 10 L/min supplemental oxygen at baseline  Obstructive sleep apnea        Pulmonary following   Patient had a long history of pulmonary fibrosis and severe pulmonary hypertension   I discussed goals of care and the goal is to make it to a family reunion at the end of June. He has been on prednisone and doxycycline outpatient for the last 2 weeks  Pulmonary has started Solu-Medrol and start prednisone. He is now a [facility], they are planning to go home with palliative care at the time of discharge. There is no leukocytosis and procalcitonin is normal  No indication for antibiotic    Has been on Lasix IV and changed to Lasix drip 5 mg/h, and now changed back to IV Lasix. Patient continues to diurese and has put out over a liter so far today bringing him to nearly 3-1/2 L diuresis. I discussed CODE STATUS with his wife,and he is now limited. I  will obtain palliative care, patient wife agrees not to intubate at this time. However we did discuss this and they might consider intubation if anyone thought he might able to come off the vent. Is currently on BiPAP with good saturation. They discussed with pulm today and are [facility]  Will meet with hospice. tmr. Troponinemia, echocardiogram is pending, troponin with non-ACS trend    DVT Prophylaxis: Lovenox  Diet: Dietary Nutrition Supplements: Low Calorie High Protein Supplement  DIET LOW SODIUM 2 GM; Carb Control: 5 carb choices (75 gms)/meal; Dysphagia Soft and Bite-Sized; 2000 ml;  No Drinking Straw  Dietary Nutrition Supplements: Other Oral Supplement (see comment)  Code Status: DNR-CCA    PT/OT Eval Status: When appropriate    Dispo -continue ICU  Level of care        Suzie Castañeda MD

## 2021-04-25 NOTE — PLAN OF CARE
Patient will maintain a patent airway by adhering to his soft foods diet, not eating while he has the bipap on, and eating in a seated position.

## 2021-04-25 NOTE — PROGRESS NOTES
P Pulmonary and Critical Care  Progress note      Interval history: Patient is lying in bed on continuous BiPAP therapy. Apparently last night required higher FiO2 through his home BiPAP and then was switched over to hospital BiPAP this morning. 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     VENTRAL HERNIA REPAIR  08/2009     Current Medications:    Current Facility-Administered Medications: pantoprazole (PROTONIX) tablet 40 mg, 40 mg, Oral, QAM AC  polyethylene glycol (GLYCOLAX) packet 17 g, 17 g, Oral, Every Other Day  insulin glargine (LANTUS;BASAGLAR) injection pen 25 Units, 25 Units, Subcutaneous, Nightly  iron sucrose (VENOFER) 200 mg in sodium chloride 0.9 % 100 mL IVPB, 200 mg, Intravenous, Q24H  cefepime (MAXIPIME) 2000 mg IVPB minibag, 2,000 mg, Intravenous, 2 times per day  methylPREDNISolone sodium (SOLU-MEDROL) injection 40 mg, 40 mg, Intravenous, Q12H  spironolactone (ALDACTONE) tablet 12.5 mg, 12.5 mg, Oral, Daily  insulin lispro (1 Unit Dial) 0-12 Units, 0-12 Units, Subcutaneous, TID WC  insulin lispro (1 Unit Dial) 0-6 Units, 0-6 Units, Subcutaneous, Nightly  potassium chloride (KLOR-CON M) extended release tablet 40 mEq, 40 mEq, Oral, PRN **OR** potassium bicarb-citric acid (EFFER-K) effervescent tablet 40 mEq, 40 mEq, Oral, PRN **OR** potassium chloride 10 mEq/100 mL IVPB (Peripheral Line), 10 mEq, Intravenous, PRN  albuterol sulfate  (90 Base) MCG/ACT inhaler 2 puff, 2 puff, Inhalation, Daily PRN  ipratropium-albuterol (DUONEB) nebulizer solution 1 ampule, 1 ampule, Inhalation, Q4H WA  allopurinol (ZYLOPRIM) tablet 100 mg, 100 mg, Oral, Daily  Arformoterol Tartrate (BROVANA) nebulizer solution 15 mcg, 15 mcg, Nebulization, BID  atorvastatin (LIPITOR) tablet 20 mg, 20 mg, Oral, Nightly  budesonide (PULMICORT) nebulizer suspension 500 mcg, 500 mcg, Nebulization, BID  busPIRone (BUSPAR) tablet 5 mg, 5 mg, Oral, TID  dilTIAZem (CARDIZEM CD) extended release capsule 120 mg, 120 mg, Oral, Daily  escitalopram (LEXAPRO) tablet 5 mg, 5 mg, Oral, Daily  metoprolol tartrate (LOPRESSOR) tablet 25 mg, 25 mg, Oral, BID  glucose (GLUTOSE) 40 % oral gel 15 g, 15 g, Oral, PRN  dextrose 50 % IV solution, 12.5 g, Intravenous, PRN  glucagon (rDNA) injection 1 mg, 1 mg, Intramuscular, PRN  dextrose 5 % solution, 100 mL/hr, Intravenous, PRN  insulin lispro (1 Unit Dial) 4 Units, 0.05 Units/kg, Subcutaneous, TID WC  sodium chloride flush 0.9 % injection 5-40 mL, 5-40 mL, Intravenous, 2 times per day  sodium chloride flush 0.9 % injection 10 mL, 10 mL, Intravenous, PRN  0.9 % sodium chloride infusion, 25 mL, Intravenous, PRN  promethazine (PHENERGAN) tablet 12.5 mg, 12.5 mg, Oral, Q6H PRN **OR** ondansetron (ZOFRAN) injection 4 mg, 4 mg, Intravenous, Q6H PRN  acetaminophen (TYLENOL) tablet 650 mg, 650 mg, Oral, Q6H PRN **OR** acetaminophen (TYLENOL) suppository 650 mg, 650 mg, Rectal, Q6H PRN  enoxaparin (LOVENOX) injection 40 mg, 40 mg, Subcutaneous, Daily    Allergies   Allergen Reactions    Aspirin Hives and Swelling    Dilaudid [Hydromorphone Hcl] Other (See Comments)     Severe hallucination       Social History:    TOBACCO:   reports that he quit smoking about 20 years ago. His smoking use included cigarettes. He has a 100.00 pack-year smoking history. He has never used smokeless tobacco.  ETOH:   reports current alcohol use of about 8.0 standard drinks of alcohol per week. Patient currently lives independently  Environmental/chemical exposure: None known    Family History:       Problem Relation Age of Onset    Early Death Mother 43        ?  MI    Alcohol Abuse Father     Colon Cancer Sister    [facility] Emphysema Brother         black lung    Colon Cancer Sister     Cancer Brother         stomach    Cancer Brother         liver    Lung Cancer Brother     Colon Cancer Brother     Kidney Disease Brother     Other Brother         sydenham chorea     REVIEW OF SYSTEMS:    CONSTITUTIONAL:  negative for fevers, chills, diaphoresis, activity change, appetite change, fatigue, night sweats and unexpected weight change. EYES:  negative for blurred vision, eye discharge, visual disturbance and icterus  HEENT:  negative for hearing loss, tinnitus, ear drainage, sinus pressure, nasal congestion, epistaxis and snoring  RESPIRATORY:  See HPI  CARDIOVASCULAR:  negative for chest pain, palpitations, exertional chest pressure/discomfort, edema, syncope  GASTROINTESTINAL:  negative for nausea, vomiting, diarrhea, constipation, blood in stool and abdominal pain  GENITOURINARY:  negative for frequency, dysuria, urinary incontinence, decreased urine volume, and hematuria  HEMATOLOGIC/LYMPHATIC:  negative for easy bruising, bleeding and lymphadenopathy  ALLERGIC/IMMUNOLOGIC:  negative for recurrent infections, angioedema, anaphylaxis and drug reactions  ENDOCRINE:  negative for weight changes and diabetic symptoms including polyuria, polydipsia and polyphagia  MUSCULOSKELETAL:  negative for  pain, joint swelling, decreased range of motion and muscle weakness  NEUROLOGICAL:  negative for headaches, slurred speech, unilateral weakness  PSYCHIATRIC/BEHAVIORAL: negative for hallucinations, behavioral problems, confusion and agitation.      Objective:   PHYSICAL EXAM:      VITALS:  /73   Pulse 90   Temp 97.2 °F (36.2 °C) (Temporal)   Resp 29   Ht 5' 5\" (1.651 m)   Wt 173 lb 8 oz (78.7 kg)   SpO2 (!) 87%   BMI 28.87 kg/m²      24HR INTAKE/OUTPUT:      Intake/Output Summary (Last 24 hours) at 4/25/2021 1426  Last data filed at 4/25/2021 0945  Gross per 24 hour   Intake 358.44 ml   Output 1330 ml   Net -971.56 ml silhouette is within normal limits. Assessment:     1. Acute on chronic hypoxemic and hypercapnic respiratory failure  2. Acute pulmonary edema  3. Severe COPD/centrilobular emphysema  4. Pulmonary fibrosis  5. Acute on chronic diastolic heart failure      Plan:     I have reviewed laboratories, medical records and images for this visit  Patient required higher FiO2 while using his home BiPAP machine. I have changed the BiPAP settings to 18/8, which she has been requiring in the hospital.  By morning he was switched over to hospital BiPAP therapy with 70% FiO2. Repeat chest imaging done today does not show any new findings. I suspect that off of BiPAP support, patient becomes hypercapnic then requires increased support. My recommendations would be to try home BiPAP therapy again tonight. Can bleeding oxygen at 10 to 15 L/min to maintain SPO2 between 88 to 92%. If you again fails this therapy, I will try to get him a new BiPAP therapy through his [employer]. Does not appear fluid overloaded now. He is on a bowel regimen to reduce any constipation. Speech has been working with him and he is doing well on his modified diet  He is agreeable to palliative care at the time of discharge.             Catrina Anne MD   Pulmonary Critical Care and Sleep Medicine  [address] [facility]   [place] [id number], Betzaida Holley, [address]  4/18/2021, 2:26 PM

## 2021-04-25 NOTE — PROGRESS NOTES
Reviewed    Infusion Medications    dextrose      sodium chloride Stopped (04/25/21 0945)     Scheduled Medications    pantoprazole  40 mg Oral QAM AC    polyethylene glycol  17 g Oral Every Other Day    insulin glargine  25 Units Subcutaneous Nightly    iron sucrose (VENOFER) iv piggyback 100 mL (Admin over 60 minutes)  200 mg Intravenous Q24H    cefepime  2,000 mg Intravenous 2 times per day    methylPREDNISolone  40 mg Intravenous Q12H    spironolactone  12.5 mg Oral Daily    insulin lispro  0-12 Units Subcutaneous TID WC    insulin lispro  0-6 Units Subcutaneous Nightly    ipratropium-albuterol  1 ampule Inhalation Q4H WA    allopurinol  100 mg Oral Daily    Arformoterol Tartrate  15 mcg Nebulization BID    atorvastatin  20 mg Oral Nightly    budesonide  500 mcg Nebulization BID    busPIRone  5 mg Oral TID    dilTIAZem  120 mg Oral Daily    escitalopram  5 mg Oral Daily    metoprolol tartrate  25 mg Oral BID    insulin lispro  0.05 Units/kg Subcutaneous TID WC    sodium chloride flush  5-40 mL Intravenous 2 times per day    enoxaparin  40 mg Subcutaneous Daily     PRN Meds: potassium chloride **OR** potassium alternative oral replacement **OR** potassium chloride, albuterol sulfate HFA, glucose, dextrose, glucagon (rDNA), dextrose, sodium chloride flush, sodium chloride, promethazine **OR** ondansetron, acetaminophen **OR** acetaminophen      Intake/Output Summary (Last 24 hours) at 4/25/2021 1442  Last data filed at 4/25/2021 0945  Gross per 24 hour   Intake 358.44 ml   Output 1330 ml   Net -971.56 ml       Physical Exam Performed:    /73   Pulse 90   Temp 97.2 °F (36.2 °C) (Temporal)   Resp 29   Ht 5' 5\" (1.651 m)   Wt 173 lb 8 oz (78.7 kg)   SpO2 (!) 87%   BMI 28.87 kg/m²     General appearance: Currently on BiPAP, in no acute respiratory distress  HEENT: Pupils equal, round, and reactive to light. Conjunctivae/corneas clear. Neck: Supple, with full range of motion.  No jugular venous distention. Trachea midline. Respiratory:  Normal respiratory effort. Clear to auscultation, bilaterally without Rales/Wheezes/Rhonchi. Cardiovascular: Regular rate and rhythm with normal S1/S2 without murmurs, rubs or gallops. Abdomen: Soft, non-tender, non-distended with normal bowel sounds. Musculoskeletal: No clubbing, cyanosis or edema bilaterally. Full range of motion without deformity. Skin: Skin color, texture, turgor normal.  No rashes or lesions. Neurologic:  Neurovascularly intact without any focal sensory/motor deficits. Cranial nerves: II-XII intact, grossly non-focal.  Psychiatric: Alert, awake and following commands. Capillary Refill: Brisk,3 seconds, normal   Peripheral Pulses: +2 palpable, equal bilaterally       Labs:   No results for input(s): WBC, HGB, HCT, PLT in the last 72 hours. Recent Labs     04/23/21  0955 04/24/21  0612 04/25/21  0310    144 145   K 4.2 4.2 4.6   CL 98* 99 100   CO2 39* 40* 40*   BUN 92* 91* 95*   CREATININE 1.5* 1.3 1.2   CALCIUM 8.3 8.4 8.6     No results for input(s): AST, ALT, BILIDIR, BILITOT, ALKPHOS in the last 72 hours. No results for input(s): INR in the last 72 hours. No results for input(s): Ethelene Soulier in the last 72 hours. Urinalysis:      Lab Results   Component Value Date    NITRU Negative 04/19/2021    WBCUA 1 04/19/2021    BACTERIA 1+ 07/22/2017    RBCUA 1 04/19/2021    BLOODU Negative 04/19/2021    SPECGRAV 1.017 04/19/2021    GLUCOSEU Negative 04/19/2021       Radiology:  XR CHEST PORTABLE   Final Result   Bilateral pleural effusions and bilateral pulmonary opacities persist, not   significantly changed         XR CHEST PORTABLE   Final Result   Persistent pleural-parenchymal disease bilaterally. No significant change         XR CHEST PORTABLE   Final Result   Persistent pulmonary edema pattern with asymmetric airspace disease in the   lung bases greater on the left with pleural effusion.   This is superimposed upon interstitial lung disease and emphysema as best demonstrated on recent   CT. CT CHEST WO CONTRAST   Final Result   Bilateral lower lobe airspace disease suspicious for pneumonia, with small   right pleural effusion. This is occurring on a background of pulmonary   emphysema and pulmonary fibrosis with chronic left pleural thickening. Interval mediastinal lymph node enlargement is likely reactive but could be   reassessed with chest CT when the patient is clinically baseline         XR CHEST PORTABLE   Final Result   Extensive bilateral airspace opacities, which may be related to multifocal   pneumonia or pulmonary edema. Small to moderate bilateral pleural effusions. Suspected cardiomegaly. Assessment/Plan:    Active Hospital Problems    Diagnosis    Atrial arrhythmia [I49.8]    Acute on chronic diastolic heart failure (HCC) [I50.33]    Pulmonary hypertension ([facility]) [I27.20]     Acute on chronic respiratory failure with hypoxia and hypercapnia  Pulmonary fibrosis  Pulmonary hypertension  Acute on chronic diastolic congestive heart failure  History of COPD  Uncontrolled diabetes type 2 with hyperglycemia  Troponinemia  History of paroxysmal atrial fibrillation  Mild hyperkalemia  Chronic respiratory failure with 10 L/min supplemental oxygen at baseline  Obstructive sleep apnea        Pulmonary following   Patient had a long history of pulmonary fibrosis and severe pulmonary hypertension   I discussed goals of care and the goal is to make it to a family reunion at the end of June. He has been on prednisone and doxycycline outpatient for the last 2 weeks  Pulmonary has started Solu-Medrol and start prednisone. He is now a [facility], they are planning to go home with palliative care at the time of discharge. DME for bed and will add one for home wheel chair.       There is no leukocytosis and procalcitonin is normal  No indication for antibiotic    Has been on Lasix IV and

## 2021-04-25 NOTE — PROGRESS NOTES
[facility]   Progress Note  CHF/Pulmonary Hypertension Cardiology    Chief complaint: We are following this patient for acute on chronic diastolic HF, pulmonary fibrosis, pulmonary hypertension  HPI:  Akanksha Melvin is an 81 yo male who presents to the ED for evaluation of shortness of breath. She has a history of COPD/emphysema, history of pulmonary fibrosis. He is followed by Dr. Griselda Griffin. He came to the ED with increasing cough and increasing shortness of breath. His wife is at bedside. The cough has rossana productive of thick mucus. He was started on doxycycline but this didn't help. He has been on prednisone for some time. The patient's shortness of breath was worse over the past 3 days. Denies chest pain. No fever or chills. No abdominal pain, nausea, vomiting, or diarrhea. No urinary symptoms. He has received COVID vaccine. He has had some lower leg edema. He has not gained any weight.       He has a history of diastolic HF followed by Dr. Susana Oh for SVT, NSVT, and atrial fibrillation. .       He chronically wears oxygen at 9 liters.       His symptoms were present at rest.  The patient's goals are to get to a family celebration in June.     We are consulted for pulmonary hypertension and fluid overload.     Labs:  Sodium 145  K 3.9  BUN/Cre 64/1.5  Albumin 3.1  H/H 11.6/36.9  WBC 10.1  CXR:  4/20/21:  Impression   Persistent pulmonary edema pattern with asymmetric airspace disease in the   lung bases greater on the left with pleural effusion.  This is superimposed   upon interstitial lung disease and emphysema as best demonstrated on recent   CT.      Echo:  4/19/21:   -Normal left ventricle size, wall thickness, and systolic function with an   estimated ejection fraction of 55-60%.    -No regional wall motion abnormalities are seen.   -Severe biatrial enlargement noted.   -There is mild-to-moderate mitral regurgitation.   -There is severe tricuspid regurgitation.   -Diastolic dysfunction is present however, indeterminate grade.   -Estimated pulmonary artery systolic pressure is severely elevated at 73   mmHg assuming a right atrial pressure of 15 mmHg.     Meds:  Lasix 20 qd  plavix 75 mg po qd  Metoprolol 25 mg po bid  diltiazem 120 mg po qd  Prednisone 20 qd     I/O's negative 2892 cc      ROS:  Doing okay. Hypoxic on bipap overnight. No chest pain or pressure. Sat up in the chair for 9 hours yesterday. No fevers or chills. Still in afib. Leg cramps overnight.  Abdomen is mildly distended, no bm       I/O's negative -1.2 cc    Medications/Labs all Reviewed    Lab Results   Component Value Date    WBC 10.1 04/21/2021    HGB 11.6 (L) 04/21/2021    HCT 36.9 (L) 04/21/2021    MCV 84.9 04/21/2021     (L) 04/21/2021     Lab Results   Component Value Date    CREATININE 1.2 04/25/2021    BUN 95 (HH) 04/25/2021     04/25/2021    K 4.6 04/25/2021     04/25/2021    CO2 40 (H) 04/25/2021     Lab Results   Component Value Date    INR 1.11 04/18/2021    PROTIME 12.9 04/18/2021        Physical Examination:    /67   Pulse 91   Temp 96.7 °F (35.9 °C) (Temporal)   Resp 27   Ht 5' 5\" (1.651 m)   Wt 173 lb 8 oz (78.7 kg)   SpO2 (!) 88%   BMI 28.87 kg/m²      Chronically ill appearing  HEENT:  NC/AT  Respiratory:  · Resp Assessment: Normal respiratory effort  · Resp Auscultation: Clear to auscultation bilaterally   Cardiovascular:  · Auscultation: irregularly irregular, normal S1S2, no murmur, rub or gallop  · Palpation:  Nl PMI  · JVP:  < 8 cm h2o  · Extremities: trace bilateral Edema  Abdomen:  · Soft, non-tender, mildly distended  · Normal bowel sounds  Extremities:  ·  No Cyanosis or Clubbing  Neurological/Psychiatric:  · Oriented to time, place, and person  · Non-anxious  Skin Warm and dry    Lab Results   Component Value Date     04/25/2021     04/24/2021     04/23/2021    K 4.6 04/25/2021    K 4.2 04/24/2021    K 4.2 04/23/2021    BUN 95 04/25/2021 BUN 91 04/24/2021    BUN 92 04/23/2021    CREATININE 1.2 04/25/2021    CREATININE 1.3 04/24/2021    CREATININE 1.5 04/23/2021    GLUCOSE 155 04/25/2021    GLUCOSE 220 04/24/2021     Lab Results   Component Value Date    PROBNP 1,665 (H) 04/24/2021    PROBNP 2,089 (H) 04/22/2021    PROBNP 1,557 (H) 04/20/2021     Lab Results   Component Value Date    ALT 12 04/21/2021    ALT 9 (L) 04/20/2021    AST 13 (L) 04/21/2021    AST 10 (L) 04/20/2021     Lab Results   Component Value Date    HGB 11.6 04/21/2021    HGB 11.4 04/20/2021    HCT 36.9 04/21/2021    HCT 37.1 04/20/2021     04/21/2021     04/20/2021     Lab Results   Component Value Date    TRIG 77 04/19/2021    TRIG 113 12/04/2020    HDL 47 04/19/2021    HDL 63 12/04/2020    LDLCALC 56 04/19/2021    LDLCALC 81 12/04/2020     Labs were reviewed including labs from other hospital systems through [facility]. Cardiac testing was reviewed including echos, nuclear scans, cardiac catheterization, including from other hospital systems through [facility]. Assessment:    1. COPD exacerbation ([facility] [address]    2. Chronic diastolic congestive heart failure, unspecified heart failure type ([facility] [address]    3. Acute on chronic respiratory failure with hypoxia and hypercapnia (HCC)     4.   Pulmonary hypertension due to chronic respiratory failure from pulmonary fibrosis. 5/  Severe hypoxemia  6. Anemia, rule out iron deficiency  7. Atrial fibrillation   8. Leg cramps     Plan:  1. Hold diuresis  2. Start IV Venofer for iron deficiency anemia  3. Continue metoprolol and diltiazem  4. Discussed risk of stroke with wife atrial fibrillation. He was previously on clopidogrel for carotid stenosis with aspirin allergy. [id number] of 6. Recommend stopping clopidogrel for several days and then starting apixiban 2.5 mg bid with ppi for stroke prevention. Discussed risks, benefits, and alternatives, including fatal hemorrhage, gi bleeding, hospitalization, and death  5.  Check vitamin d, phos, mag due to cramps    Chest xray personally interpreted  EKG personally interpreted  Telemetry, labs, old notes personally interpreted  Discussed with nursing  Moderate complexity  Moderate risk    ~fluid restriction  ~medication compliance  ~daily weights and notify of any significant weight gain/loss  ~establish with CHF nurse  ~outpatient follow-up with our CHF team    Discussed with patient, wife, and bedside nurse.   IV Venofer        NYHA Class: [id number] DO, 4/25/2021 7:54 AM

## 2021-04-26 NOTE — PROGRESS NOTES
Speech Language Pathology  Dysphagia Treatment Note    Name: Jose Flores  :  10/3/1932  Medical Diagnosis:  Acute on chronic diastolic heart failure (HCC) [I50.33]  Treatment Diagnosis: Oropharyngeal Dysphagia  Pain level: None reported    Current Diet Level: Dysphagia III (Soft and Bite-Sized) Diet with thin liquids with STRICT aspiration precautions; meds in puree. NO straws   Tolerance of Current Diet Level: Per pt's wife report, pt continues with reduced appetite and difficulty chewing eggs this AM. Pt's wife reports pt only consumed a few bites of eggs because he was chewing them so much and got tired. Denies any straw use with thins. (see below for direct education)     Assessment of Texture Tolerance: Did not provide any po trials as pt was currently on BiPap with SPO2 at 94-95% and RR ranging from 27-40/min. Pt able to talk to single words to wife but education was provided mostly to patient's wife. Education provided: Provided education to pt/ pt's wife re: use of strict aspiration precautions. Reviewed all at length (I.e. no po on BiPap; strict upright positioning with po; small bites; small sips; alternate textures (soft solids and natural purees); take frequent rest breaks during meals with use of diaphragmatic breathing as needed, and consuming small more frequent meals vs 3 normal sized meals). Pt's wife v/u stating pt generally is able to follow/ recall precautions and takes his time. Reports pt is usually able to get one good meal in and Ensures/ Boost puddings throughout the day. Dietician following closely. Educated pt's wife on selecting 1-2 items pt may have to chew as well as natural purees where pt doesn't have to work as hard so he can continue to get adequate nutrition.  Informed pt's wife to ask for extra sauces/ gravy to further moisten soft food items and to alternate textures with thicker natural purees (applesauce/ yogurt/ pudding etc.) to help clear mouth and minimize

## 2021-04-26 NOTE — PLAN OF CARE
Nutrition Problem #1: Inadequate oral intake  Intervention: Food and/or Nutrient Delivery: Continue Current Diet, Continue Oral Nutrition Supplement  Nutritional Goals: PO intake 50% or greater of meals & ONS

## 2021-04-26 NOTE — PROGRESS NOTES
Education completed(4/24)   Coordination of Nutrition Care:  Continue to monitor while inpatient, Coordination of Community Care(referral to Mccoy Felty for oral nutrition supplements)    Goals:  PO intake 50% or greater of meals & ONS       Nutrition Monitoring and Evaluation:   Behavioral-Environmental Outcomes:  None Identified   Food/Nutrient Intake Outcomes:  Food and Nutrient Intake, Supplement Intake  Physical Signs/Symptoms Outcomes:  Biochemical Data, Chewing or Swallowing, Weight     Discharge Planning:    Continue current diet, Continue Oral Nutrition Supplement     Electronically signed by Emerson Kimble RD, LD on 4/26/21 at 10:22 AM EDT  Contact: [phone]

## 2021-04-26 NOTE — PROGRESS NOTES
due to his pulmonary fibrosis, not heart failure. 4.  Continue antibiotics per hospitalist  5. Continue low dose metoprolol and diltiazem for Afib and SVT  6. Continue Plavix  7.  continue spironolactone 12.5 mg po qd for chronic edema and chronic HF  8.  restart po lasix today 20 mg po qd  9. Send home on low dose spironolactone. Hold on ACEI or ARB until outpatient. 10.  He received IV Venofer for iron deficiency. 9  CHF education reinforced. ~salt restriction  ~fluid restriction  ~medication compliance  ~daily weights and notify of any significant weight gain/loss  ~establish with CHF nurse  ~outpatient follow-up with our CHF team    Discussed with patient, wife, and bedside nurse. Restart po lasix. He is still here with high oxygen demands after diuresis due to worsening pulmonary fibrosis and chronic lung disease. May need to reconsider home hospice.           NYHA Class: 4    Da Felipe MD, 4/26/2021 1:52 PM

## 2021-04-26 NOTE — PROGRESS NOTES
Physical Therapy  Facility/Department: [facility]  Daily Treatment Note  NAME: Samantha Culp  : 10/3/1932  MRN: [id number]    Date of Service: 2021    Discharge Recommendations:Amparo Dobbs scored a 17/24 on the AM-PAC short mobility form. Current research shows that an AM-PAC score of 18 or greater is typically associated with a discharge to the patient's home setting. Based on the patient's AM-PAC score and their current functional mobility deficits, it is recommended that the patient have 2-3 sessions per week of Physical Therapy at d/c to increase the patient's independence. At this time, this patient demonstrates the endurance and safety to discharge home with home PT and a follow up treatment frequency of 2-3x/wk. Please see assessment section for further patient specific details. HOME HEALTH CARE: LEVEL 4 SICK     - Initial home health evaluation to occur within 24-48 hours, in patient home   - Therapy evaluations in home within 24-48 hours of discharge; including DME and home safety   - Frontload therapy 5 days, then 3x a week   - Therapy to evaluate if patient has [address] needs for personal care   -  evaluation within 24-48 hours, includes evaluation of resources and insurance to determine [place], [place], LTC, and Medicaid options  If patient discharges prior to next session this note will serve as a discharge summary. Please see below for the latest assessment towards goals. S Level 4   PT Equipment Recommendations  Walker: Rolling, hosp bed. Wheelchair: Transport Chair  Other: Patient needs hospital bed as patient is aspiration risk and needs frequent respositioning for skin protection and improved pulmonary function. Patient needs transport w/c for primary means of mobility as unable to ambulate due to poor pulmonary function. Needs transport chair as patient would be unable to self propel. Needs RW for safe transfers to w/c.     Assessment   Body structures, Functions, Activity limitations: Decreased functional mobility ; Decreased endurance;Decreased strength;Decreased balance  Assessment: Pt needing min A bed mob and transfers bed to chair. Patient not at baseline function and would benefit from skilled PT to address above deficits and facilitate return to baseline function. Patient signficantly limited by respiratory status. Patient needed decreased assistance this date compared to previous session. Discussed impotance of taking time and rest breaks as patient tends to want to rush through transfer which could be unsafe. Treatment Diagnosis: decreased functional mobility, decreased endurance  Clinical Presentation: evolving  PT Education: Goals;PT Role;Plan of Care;Family Education;Equipment  Patient Education: Significant time spend on education on energy conservation, DME needs and safety in home with spouse and patient. All questions answered. -Spouse verbalized understanding (patient unable to verbalize understanding due to bipap) 4/23: educated spouse on transfer training and safety at home, educated patient and spouse on importance of taking rest breaks (ie resting on edge of bed to recover prior to transferring to chair) and importance of allowing patient to do as much as he can for himself to maintain strength and mobility and to protect skin/joints etc.  Barriers to Learning: none  REQUIRES PT FOLLOW UP: Yes  Activity Tolerance  Activity Tolerance: Patient limited by endurance  Activity Tolerance: limited by anxiety     Patient Diagnosis(es): The primary encounter diagnosis was COPD exacerbation ([address] Diagnoses of Acute on chronic congestive heart failure, unspecified heart failure type ([address] and Acute on chronic respiratory failure with hypoxia and hypercapnia (HCC) were also pertinent to this visit.      has a past medical history of Arthritis, Emphysema of lung ([address] Full dentures, Hearing aid worn, Hypertension, On home oxygen therapy, Pulmonary fibrosis ([address] and Sleep apnea. 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 Restrictions  Restrictions/Precautions  Restrictions/Precautions: Fall Risk(high fall risk)  Required Braces or Orthoses?: No  Position Activity Restriction  Other position/activity restrictions: Niko Collier is a 80 y.o. male who presents to the emergency department today for evaluation for shortness of breath. The patient has a history of COPD/emphysema, history of pulmonary fibrosis. The patient is followed by Dr. Johnathan Marmolejo, pulmonology. The patient states that since Friday he has had increasing cough, and increasing shortness of breath. The wife is at bedside and she states that the cough has been productive of thick mucus, and so she states that she started him on doxycycline as prescribed by Dr. Naomi Michele. She states that she also started him on the prednisone. She states that the patient's shortness of breath has been worse over the past 3 days, when patient arrived to the ED he is on a nonrebreather, and O2 sats are 89% on room air the patient denies any chest pain. He is not had any fever or chills. Is not had any abdominal pain, nausea, vomiting or diarrhea. No urinary symptoms. Patient has not had any congestion, he states that he has been vaccinated for COVID-19. He states that he has noticed some lower leg edema but does not feel that he has gained any weight, and the patient otherwise has no complaints at this time. Subjective   General  Chart Reviewed: Yes  Response To Previous Treatment: Patient with no complaints from previous session. Family / Caregiver Present: Yes(nursing and wife)  Subjective  Subjective: Pt anxious but agreeable to getting up to chair.   General Comment  Comments: Pt supine in bed and CGA  Short term goal 4: Bed to/from chair with CGA - MET  Patient Goals   Patient goals : To be present for family reuinion in June [address]  Times per week: 3-5  Times per day: Daily  Current Treatment Recommendations: Strengthening, Balance Training, Functional Mobility Training, Transfer Training, Endurance Training, Gait Training, Safety Education & Training, Home Exercise Program  Safety Devices  Type of devices:  All fall risk precautions in place, Call light within reach, Nurse notified, Left in chair, Chair alarm in place, Gait belt  Restraints  Initially in place: No     Therapy Time   Individual Concurrent Group Co-treatment   Time In 0833         Time Out 0901         Minutes 28         Timed Code Treatment Minutes: [address], [id number]

## 2021-04-26 NOTE — PROGRESS NOTES
Hospitalist Progress Note      PCP: Abdullahi Peterson MD    Date of Admission: 4/18/2021    Chief Complaint: Shortness of breath    Hospital Course:      80 y.o. male with history of severe COPD, pulmonary fibrosis, chronic respiratory failure with hypoxia and hypercapnia (10 L/min supplemental oxygen at baseline), severe pulmonary hypertension, obstructive sleep apnea, chronic diastolic CHF, chronic steroid use (prednisone 20 mg daily), obesity with BMI of 31 kg/m², who was recently evaluated by primary care physician within the past 2 weeks for ongoing cough, shortness of breath, sputum production, recently placed on steroid taper and doxycycline, who presents to the emergency room with complaints of worsening cough, shortness of breath, over the last 2 days. He does not have fever or chills. He has had progressive worsening of shortness of breath over the last 6 months. He was noted to be saturating 87% on 11 L via nasal cannula, transitioned to 100% supplemental oxygen via nonrebreather and had to be transitioned to BiPAP in the emergency room. Of note, patient has been vaccinated for COVID-19. Subjective:   Currently on BiPAP with FiO2 55%  proBNP on admission 2458  Has been worsening in the last 1 to 2 weeks  His baseline is 10 L/min nasal cannula of oxygen  Pulmonary fibrosis and pulmonary hypertension  PCP has been discussing hospice option with family, wife  Currently on Lasix and BiPAP pending echo  Had multiple doses of doxycycline and prednisone recently with PCP and pulmonary outpatient      4/21  Today he is off bipap and tolerating this well.     4/22  Patient is back on BiPAP today he has been taken off of the Lasix drip  He is diuresing better with IV push diuretics  The patient is comfortable on the BiPAP    4/23  On and off BiPAP and doing well  For him    4/25  Over night he needs his Bipap oxygen turned up as he was not saturating well and was in the mid 80S    4/26  Needed to go back on BiPAP and now not able to come off it.       Medications:  Reviewed    Infusion Medications    sodium chloride      dextrose      sodium chloride Stopped (04/25/21 1716)     Scheduled Medications    furosemide  20 mg Oral Daily    lidocaine 1 % injection  5 mL Intradermal Once    sodium chloride flush  5-40 mL Intravenous 2 times per day    pantoprazole  40 mg Oral QAM AC    polyethylene glycol  17 g Oral Every Other Day    insulin glargine  25 Units Subcutaneous Nightly    cefepime  2,000 mg Intravenous 2 times per day    methylPREDNISolone  40 mg Intravenous Q12H    spironolactone  12.5 mg Oral Daily    insulin lispro  0-12 Units Subcutaneous TID WC    insulin lispro  0-6 Units Subcutaneous Nightly    ipratropium-albuterol  1 ampule Inhalation Q4H WA    allopurinol  100 mg Oral Daily    Arformoterol Tartrate  15 mcg Nebulization BID    atorvastatin  20 mg Oral Nightly    budesonide  500 mcg Nebulization BID    busPIRone  5 mg Oral TID    dilTIAZem  120 mg Oral Daily    escitalopram  5 mg Oral Daily    metoprolol tartrate  25 mg Oral BID    insulin lispro  0.05 Units/kg Subcutaneous TID WC    sodium chloride flush  5-40 mL Intravenous 2 times per day    enoxaparin  40 mg Subcutaneous Daily     PRN Meds: sodium chloride flush, sodium chloride, potassium chloride **OR** potassium alternative oral replacement **OR** potassium chloride, albuterol sulfate HFA, glucose, dextrose, glucagon (rDNA), dextrose, sodium chloride flush, sodium chloride, promethazine **OR** ondansetron, acetaminophen **OR** acetaminophen      Intake/Output Summary (Last 24 hours) at 4/26/2021 1836  Last data filed at 4/25/2021 2126  Gross per 24 hour   Intake --   Output 100 ml   Net -100 ml       Physical Exam Performed:    /68   Pulse 99   Temp 97.4 °F (36.3 °C) (Temporal)   Resp (!) 31   Ht 5' 5\" (1.651 m)   Wt 172 lb 2.9 oz (78.1 kg)   SpO2 95%   BMI 28.65 kg/m²     General appearance: Currently on BiPAP, in no acute respiratory distress  HEENT: Pupils equal, round, and reactive to light. Conjunctivae/corneas clear. Neck: Supple, with full range of motion. No jugular venous distention. Trachea midline. Respiratory:  Normal respiratory effort. Clear to auscultation, bilaterally without Rales/Wheezes/Rhonchi. Cardiovascular: Regular rate and rhythm with normal S1/S2 without murmurs, rubs or gallops. Abdomen: Soft, non-tender, non-distended with normal bowel sounds. Musculoskeletal: No clubbing, cyanosis or edema bilaterally. Full range of motion without deformity. Skin: Skin color, texture, turgor normal.  No rashes or lesions. Neurologic:  Neurovascularly intact without any focal sensory/motor deficits. Cranial nerves: II-XII intact, grossly non-focal.  Psychiatric: Alert, awake and following commands. Capillary Refill: Brisk,3 seconds, normal   Peripheral Pulses: +2 palpable, equal bilaterally       Labs:   No results for input(s): WBC, HGB, HCT, PLT in the last 72 hours. Recent Labs     04/24/21  0612 04/25/21  0310 04/26/21  0830    145 147*   K 4.2 4.6 4.7   CL 99 100 103   CO2 40* 40* 38*   BUN 91* 95* 99*   CREATININE 1.3 1.2 1.3   CALCIUM 8.4 8.6 8.7   PHOS  --   --  3.4     No results for input(s): AST, ALT, BILIDIR, BILITOT, ALKPHOS in the last 72 hours. No results for input(s): INR in the last 72 hours. No results for input(s): [place] Carranza in the last 72 hours. Urinalysis:      Lab Results   Component Value Date    NITRU Negative 04/19/2021    WBCUA 1 04/19/2021    BACTERIA 1+ 07/22/2017    RBCUA 1 04/19/2021    BLOODU Negative 04/19/2021    SPECGRAV 1.017 04/19/2021    GLUCOSEU Negative 04/19/2021       Radiology:  XR CHEST PORTABLE   Final Result   Bilateral pleural effusions and bilateral pulmonary opacities persist, not   significantly changed         XR CHEST PORTABLE   Final Result   Persistent pleural-parenchymal disease bilaterally.   No significant change XR CHEST PORTABLE   Final Result   Persistent pulmonary edema pattern with asymmetric airspace disease in the   lung bases greater on the left with pleural effusion. This is superimposed   upon interstitial lung disease and emphysema as best demonstrated on recent   CT. CT CHEST WO CONTRAST   Final Result   Bilateral lower lobe airspace disease suspicious for pneumonia, with small   right pleural effusion. This is occurring on a background of pulmonary   emphysema and pulmonary fibrosis with chronic left pleural thickening. Interval mediastinal lymph node enlargement is likely reactive but could be   reassessed with chest CT when the patient is clinically baseline         XR CHEST PORTABLE   Final Result   Extensive bilateral airspace opacities, which may be related to multifocal   pneumonia or pulmonary edema. Small to moderate bilateral pleural effusions. Suspected cardiomegaly. Assessment/Plan:    Active Hospital Problems    Diagnosis    Atrial arrhythmia [I49.8]    Acute on chronic diastolic heart failure (HCC) [I50.33]    Pulmonary hypertension ([facility]) [I27.20]     Acute on chronic respiratory failure with hypoxia and hypercapnia  Pulmonary fibrosis  Pulmonary hypertension  Acute on chronic diastolic congestive heart failure  History of COPD  Uncontrolled diabetes type 2 with hyperglycemia  Troponinemia  History of paroxysmal atrial fibrillation  Mild hyperkalemia  Chronic respiratory failure with 10 L/min supplemental oxygen at baseline  Obstructive sleep apnea    The main issue is the pulmonary fibrosis and severe COPD. He did have some fluid overload but that was not the main problem and he has diuresed and is still having respiratory issues. Pulmonary following   Patient had a long history of pulmonary fibrosis and severe pulmonary hypertension   I discussed goals of care and the goal is to make it to a family reunion at the end of June.    He has been on prednisone and doxycycline outpatient for the last 2 weeks  Pulmonary has started Solu-Medrol and start prednisone. He is now a [facility], they are planning to go home with palliative care at the time of discharge. DME for bed and will add one for home transport chair. Puldawna has ordered a new home vent unit. Now waiting on insurance to get it done. There is no leukocytosis and procalcitonin is normal  No indication for antibiotic    Has been on Lasix IV and changed to Lasix drip 5 mg/h, and now changed back to IV Lasix. Patient continues to diurese and has put out over a liter so far today bringing him to nearly 3-1/2 L diuresis. Should be good for d/c this week. Shooting for Tuesday, but will be dependent on getting the new unit for home. I discussed CODE STATUS with his wife,and he is now limited. I  will obtain palliative care, patient wife agrees not to intubate at this time. Is currently on BiPAP with good saturation. They discussed with pulm today and are [facility]  Will go home with palliative care on Monday now. Troponinemia, echocardiogram is pending, troponin with non-ACS trend    DVT Prophylaxis: Lovenox  Diet: Dietary Nutrition Supplements: Low Calorie High Protein Supplement  DIET LOW SODIUM 2 GM; Carb Control: 5 carb choices (75 gms)/meal; Dysphagia Soft and Bite-Sized; 2000 ml;  No Drinking Straw  Dietary Nutrition Supplements: Other Oral Supplement (see comment)  Code Status: DNR-CCA    PT/OT Eval Status: When appropriate    Dispo -continue ICU  Level of care        Deven Odell MD

## 2021-04-26 NOTE — PROGRESS NOTES
PRN  albuterol sulfate  (90 Base) MCG/ACT inhaler 2 puff, 2 puff, Inhalation, Daily PRN  ipratropium-albuterol (DUONEB) nebulizer solution 1 ampule, 1 ampule, Inhalation, Q4H WA  allopurinol (ZYLOPRIM) tablet 100 mg, 100 mg, Oral, Daily  Arformoterol Tartrate (BROVANA) nebulizer solution 15 mcg, 15 mcg, Nebulization, BID  atorvastatin (LIPITOR) tablet 20 mg, 20 mg, Oral, Nightly  budesonide (PULMICORT) nebulizer suspension 500 mcg, 500 mcg, Nebulization, BID  busPIRone (BUSPAR) tablet 5 mg, 5 mg, Oral, TID  dilTIAZem (CARDIZEM CD) extended release capsule 120 mg, 120 mg, Oral, Daily  escitalopram (LEXAPRO) tablet 5 mg, 5 mg, Oral, Daily  metoprolol tartrate (LOPRESSOR) tablet 25 mg, 25 mg, Oral, BID  glucose (GLUTOSE) 40 % oral gel 15 g, 15 g, Oral, PRN  dextrose 50 % IV solution, 12.5 g, Intravenous, PRN  glucagon (rDNA) injection 1 mg, 1 mg, Intramuscular, PRN  dextrose 5 % solution, 100 mL/hr, Intravenous, PRN  insulin lispro (1 Unit Dial) 4 Units, 0.05 Units/kg, Subcutaneous, TID WC  sodium chloride flush 0.9 % injection 5-40 mL, 5-40 mL, Intravenous, 2 times per day  sodium chloride flush 0.9 % injection 10 mL, 10 mL, Intravenous, PRN  0.9 % sodium chloride infusion, 25 mL, Intravenous, PRN  promethazine (PHENERGAN) tablet 12.5 mg, 12.5 mg, Oral, Q6H PRN **OR** ondansetron (ZOFRAN) injection 4 mg, 4 mg, Intravenous, Q6H PRN  acetaminophen (TYLENOL) tablet 650 mg, 650 mg, Oral, Q6H PRN **OR** acetaminophen (TYLENOL) suppository 650 mg, 650 mg, Rectal, Q6H PRN  enoxaparin (LOVENOX) injection 40 mg, 40 mg, Subcutaneous, Daily    Allergies   Allergen Reactions    Aspirin Hives and Swelling    Dilaudid [Hydromorphone Hcl] Other (See Comments)     Severe hallucination       Social History:    TOBACCO:   reports that he quit smoking about 20 years ago. His smoking use included cigarettes. He has a 100.00 pack-year smoking history.  He has never used smokeless tobacco.  ETOH:   reports current alcohol use of about 8.0 standard drinks of alcohol per week. Patient currently lives independently  Environmental/chemical exposure: None known    Family History:       Problem Relation Age of Onset    Early Death Mother 43        ? MI    Alcohol Abuse Father     Colon Cancer Sister     Emphysema Brother         black lung    Colon Cancer Sister     Cancer Brother         stomach    Cancer Brother         liver    Lung Cancer Brother     Colon Cancer Brother     Kidney Disease Brother     Other Brother         sydenham chorea     REVIEW OF SYSTEMS:    CONSTITUTIONAL:  negative for fevers, chills, diaphoresis, activity change, appetite change, fatigue, night sweats and unexpected weight change. EYES:  negative for blurred vision, eye discharge, visual disturbance and icterus  HEENT:  negative for hearing loss, tinnitus, ear drainage, sinus pressure, nasal congestion, epistaxis and snoring  RESPIRATORY:  See HPI  CARDIOVASCULAR:  negative for chest pain, palpitations, exertional chest pressure/discomfort, edema, syncope  GASTROINTESTINAL:  negative for nausea, vomiting, diarrhea, constipation, blood in stool and abdominal pain  GENITOURINARY:  negative for frequency, dysuria, urinary incontinence, decreased urine volume, and hematuria  HEMATOLOGIC/LYMPHATIC:  negative for easy bruising, bleeding and lymphadenopathy  ALLERGIC/IMMUNOLOGIC:  negative for recurrent infections, angioedema, anaphylaxis and drug reactions  ENDOCRINE:  negative for weight changes and diabetic symptoms including polyuria, polydipsia and polyphagia  MUSCULOSKELETAL:  negative for  pain, joint swelling, decreased range of motion and muscle weakness  NEUROLOGICAL:  negative for headaches, slurred speech, unilateral weakness  PSYCHIATRIC/BEHAVIORAL: negative for hallucinations, behavioral problems, confusion and agitation.      Objective:   PHYSICAL EXAM:      VITALS:  /81   Pulse 88   Temp 97.4 °F (36.3 °C) (Temporal)   Resp (!) 35   Ht reviewed by me and my interpretation is: Bilateral lung fields show prominent bronchovascular markings with bilateral pleural effusions. These findings are stable in comparison to the previous imaging. No new findings are seen. No pneumothorax seen. Cardiac silhouette is within normal limits. Assessment:     1. Acute on chronic hypoxemic and hypercapnic respiratory failure  2. Acute pulmonary edema  3. Severe COPD/centrilobular emphysema  4. Pulmonary fibrosis  5. Obstructive sleep apnea on BiPAP therapy. 6. Acute on chronic diastolic heart failure      Plan:     I have reviewed laboratories, medical records and images for this visit  Patient was unable to tolerate his own BiPAP machine. BiPAP is being used because of obstructive sleep apnea. More than likely his BiPAP machine is too old and not working properly. He will need a new machine in order to provide him with adequate therapy. I submitted a prescription for a new machine to his [employer] company [[employer]]. He may need to bleed oxygen at 10 to 12 L/min into the BiPAP therapy in order to achieve adequate oxygen saturation. Currently on BiPAP setting of 18/8 cmH2O with 70% FiO2. Titrate FiO2 to maintain oxygen saturation between 88 to 92%. Patient received Lasix 20 mg p.o. today. Continue to monitor his urine output. Does not appear overtly fluid overloaded. Patient will benefit from getting a PICC line placed which can be used for as needed IV medications since patient may be dependent on BiPAP therapy to get consistent oral medications. He is on a bowel regimen to reduce any constipation. Speech has been working with him and he is doing well on his modified diet  He is agreeable to palliative care at the time of discharge.             Ruben Mena MD   Pulmonary Critical Care and Sleep Medicine  [address] [facility],[address]   [facility] [place], Betzaida Holley, [address]  4/18/2021, 3:54 PM

## 2021-04-27 NOTE — PROGRESS NOTES
[address]  HEART FAILURE  Progress Note      Admit Date 4/18/2021     Reason for Consult:      Reason for Consultation/Chief Complaint: CHF    HPI:    Chi Burgos is a 80 y.o. male with PMH COPD/emphysema, HFpEF, pulmonary fibrosis on home O2 at 9L admitted with cough and SOB. Condition has deteriorated over the past few days, diuretics held for increased BUN/Cr. Family has now decided to go home with hospice care. Subjective:  Patient is being seen for CHF.  Pt in bed, on BiPAP, mild resp distress      Wt Readings from Last 3 Encounters:   04/27/21 174 lb 6.1 oz (79.1 kg)   04/05/21 188 lb 9.6 oz (85.5 kg)   02/24/21 191 lb 12.8 oz (87 kg)          Objective:   /66   Pulse 120   Temp 97 °F (36.1 °C) (Temporal)   Resp (!) 38   Ht 5' 5\" (1.651 m)   Wt 174 lb 6.1 oz (79.1 kg)   SpO2 94%   BMI 29.02 kg/m²       Intake/Output Summary (Last 24 hours) at 4/27/2021 1026  Last data filed at 4/26/2021 1700  Gross per 24 hour   Intake 120 ml   Output 100 ml   Net 20 ml      Wt Readings from Last 3 Encounters:   04/27/21 174 lb 6.1 oz (79.1 kg)   04/05/21 188 lb 9.6 oz (85.5 kg)   02/24/21 191 lb 12.8 oz (87 kg)      In: 60 [P.O.:60]  Out: -       Physical Exam:  General Appearance:  Non-obese/Well Nourished  Respiratory:  · Resp Auscultation: Rales  Cardiovascular:  · Auscultation: Regular rate and rhythm, normal S1S2, no m/g/r/c  · Palpation: Normal    · Pedal Pulses: 2+ and equal   Abdomen:  · Soft, NT, ND, + bs  Extremities:  · No Cyanosis or Clubbing  · Extremities: Trace pitting edema  ·  place, and person      MEDICATIONS:   Scheduled Meds:   Scheduled Meds:   [START ON 4/28/2021] furosemide  20 mg Intravenous Daily    lidocaine 1 % injection  5 mL Intradermal Once    sodium chloride flush  5-40 mL Intravenous 2 times per day    pantoprazole  40 mg Oral QAM AC    polyethylene glycol  17 g Oral Every Other Day    insulin glargine  25 Units Subcutaneous Nightly    cefepime  2,000 mg Intravenous 2 times per day    methylPREDNISolone  40 mg Intravenous Q12H    spironolactone  12.5 mg Oral Daily    insulin lispro  0-12 Units Subcutaneous TID     insulin lispro  0-6 Units Subcutaneous Nightly    ipratropium-albuterol  1 ampule Inhalation Q4H WA    allopurinol  100 mg Oral Daily    Arformoterol Tartrate  15 mcg Nebulization BID    atorvastatin  20 mg Oral Nightly    budesonide  500 mcg Nebulization BID    busPIRone  5 mg Oral TID    dilTIAZem  120 mg Oral Daily    escitalopram  5 mg Oral Daily    metoprolol tartrate  25 mg Oral BID    insulin lispro  0.05 Units/kg Subcutaneous TID     sodium chloride flush  5-40 mL Intravenous 2 times per day    enoxaparin  40 mg Subcutaneous Daily     Continuous Infusions:   sodium chloride      dextrose      sodium chloride Stopped (04/25/21 1716)     PRN Meds:. ALPRAZolam, sodium chloride flush, sodium chloride, potassium chloride **OR** potassium alternative oral replacement **OR** potassium chloride, albuterol sulfate HFA, glucose, dextrose, glucagon (rDNA), dextrose, sodium chloride flush, sodium chloride, promethazine **OR** ondansetron, acetaminophen **OR** acetaminophen  Continuous Infusions:   sodium chloride      dextrose      sodium chloride Stopped (04/25/21 1716)       Intake/Output Summary (Last 24 hours) at 4/27/2021 1026  Last data filed at 4/26/2021 1700  Gross per 24 hour   Intake 120 ml   Output 100 ml   Net 20 ml       Lab Data:  CBC:   Lab Results   Component Value Date    WBC 10.1 04/21/2021    HGB 11.6 04/21/2021     04/21/2021     BMP:  Lab Results   Component Value Date     04/27/2021    K 4.8 04/27/2021     04/27/2021    CO2 34 04/27/2021     04/27/2021    CREATININE 1.4 04/27/2021    GLUCOSE 174 04/27/2021     INR:   Lab Results   Component Value Date    INR 1.11 04/18/2021        CARDIAC LABS  ENZYMES:No results for input(s): CKMB, CKMBINDEX, TROPONINI in the last 72 hours.     Invalid input(s): CKTOTAL;3  FASTING LIPID PANEL:  Lab Results   Component Value Date    HDL 47 04/19/2021    LDLCALC 56 04/19/2021    TRIG 77 04/19/2021    TSH 0.96 05/31/2017     LIVER PROFILE:  Lab Results   Component Value Date    AST 13 04/21/2021    AST 10 04/20/2021    ALT 12 04/21/2021    ALT 9 04/20/2021     BNP:   Lab Results   Component Value Date    PROBNP 1,665 04/24/2021    PROBNP 2,089 04/22/2021    PROBNP 1,557 04/20/2021     Iron Studies:  No results found for: FERRITIN  Lab Results   Component Value Date    IRON 28 (L) 04/22/2021    TIBC 301 04/22/2021            1. WEIGHT: Admit Weight: 188 lb (85.3 kg)      Today  Weight: 174 lb 6.1 oz (79.1 kg)   2. I/O     Intake/Output Summary (Last 24 hours) at 4/27/2021 1026  Last data filed at 4/26/2021 1700  Gross per 24 hour   Intake 120 ml   Output 100 ml   Net 20 ml       Cardiac Testing:    Echo:  4/19/21:   -Normal left ventricle size, wall thickness, and systolic function with an   estimated ejection fraction of 55-60%.  -No regional wall motion abnormalities are seen.   -Severe biatrial enlargement noted.   -There is mild-to-moderate mitral regurgitation.   -There is severe tricuspid regurgitation.   -Diastolic dysfunction is present however, indeterminate grade.   -Estimated pulmonary artery systolic pressure is severely elevated at 73   mmHg assuming a right atrial pressure of 15 mmHg. Assessment/Plan:     1. CHF- compensated, continue IV lasix as needed for comfort care, will sign off, please call with any questions/concerns  2. PAH/Pulm fibrosis - end stage, to go home with Hospice  3.  Anemia- venofer given          I appreciate the opportunity of cooperating in the care of this individual.    [facility], [address] 4/27/2021, 10:26 AM  Heart Failure  The [address], [address]  Ph: [phone]      Core Measures:   · Discharge instructions:   · LVEF documented:   · ACEI for LV dysfunction: · Smoking Cessation:

## 2021-04-27 NOTE — PLAN OF CARE
Problem: Falls - Risk of:  Goal: Will remain free from falls  Description: Will remain free from falls  Outcome: Met This Shift  Note: Patient's wife is always at bedside and assists him with many of the things he needs, such as suction and exchange of the BiPAP for high flow oxygen, urinal, eating or drinking and ice chips. She often puts on his call light when assistance is necessary from staff. Problem: OXYGENATION/RESPIRATORY FUNCTION  Goal: Patient will maintain patent airway  Outcome: Met This Shift  Note: Patient required BiPAP to maintain patent airway and keep sat greater then 88% for most of the day. He was on high flow oxygen for approximately 1.5 hours total today. Problem: Falls - Risk of:  Goal: Absence of physical injury  Description: Absence of physical injury  Outcome: Ongoing     Problem: HEMODYNAMIC STATUS  Goal: Patient has stable vital signs and fluid balance  Outcome: Ongoing     Problem: FLUID AND ELECTROLYTE IMBALANCE  Goal: Fluid and electrolyte balance are achieved/maintained  Outcome: Ongoing     Problem: ACTIVITY INTOLERANCE/IMPAIRED MOBILITY  Goal: Mobility/activity is maintained at optimum level for patient  Outcome: Ongoing     Problem: Skin Integrity:  Goal: Will show no infection signs and symptoms  Description: Will show no infection signs and symptoms  Outcome: Ongoing  Goal: Absence of new skin breakdown  Description: Absence of new skin breakdown  Outcome: Ongoing     Problem: Nutrition  Goal: Optimal nutrition therapy  4/26/2021 2229 by Patty Fallon RN  Outcome: Ongoing  4/26/2021 1021 by Mary Anne Monte RD, LD  Outcome: Ongoing     Problem: Pain:  Description: Pain management should include both nonpharmacologic and pharmacologic interventions.   Goal: Pain level will decrease  Description: Pain level will decrease  Outcome: Ongoing  Goal: Control of acute pain  Description: Control of acute pain  Outcome: Ongoing  Goal: Control of chronic pain  Description:

## 2021-04-27 NOTE — PROGRESS NOTES
Speech Language Pathology  Discharge Summary     Name: Viral Bush  :  10/3/1932  Medical Diagnosis:  Acute on chronic diastolic heart failure ([facility] [address] [I50.33]    Per chart review and palliative care/ spiritual care notes pt/ pt's family have selected hospice consult at this time. Pt/ pt's family wishes for pt to remain on thin liquid and current diet level. Provided all education to pt/ pt's wife re: how to minimize aspiration risk (see note from ). Due to pt/ pt's family deciding on hospice care at this time will sign off. Please re-consult speech therapy if POC changes and aggressive therapy is warranted. Thanks,  Ilda MOISE  [facility] #[id number] 2021 1:09 PM  Speech-Language Pathologist

## 2021-04-27 NOTE — FLOWSHEET NOTE
Reason for Visit: [address], Request from family for Prayer    Mormonism/Spiritual/Philosophical belief: Pt [facility]    Summary:  self-initiated this visit w/the pt and family to assess spiritual care needs. Pt's spouse and children were present at bedside and requested prayer for on-going pt's peace, comfort, and strength. Pt woke up briefly to engage in prayer at this time. Pt's family presented as appropriately tearful at bedside and appear to be coping well/appropriately at this time. Family expressed gratitude for on-going support at this time. Family expressed no other immediate needs at this time. Recommendations: Should any needs arise, please contact spiritual care services for follow-up. Electronically signed by Anoop Armas on 4/27/2021 at 12:31 PM     04/27/21 1215   Encounter Summary   Services provided to: Patient and family together   Referral/Consult From: Nurse; Hospice   Support System Spouse; Children;Family members   Place of Muslim [employer]   (Support, prayer, pt going to hospice, GB 4/27)   Complexity of Encounter Moderate   Length of Encounter 15 minutes   Spiritual/Scientology   Type Spiritual support   Assessment Calm; Approachable;Tearful;Grieving   Intervention Active listening;Explored feelings, thoughts, concerns;Explored coping resources;Nurtured hope;Prayer;Sustaining presence/ Ministry of presence;Grief care; End of life care; Discussed belief system/Latter day practices/kiana;Discussed illness/injury and it's impact   Outcome Connection/belonging;Comfort;Engaged in conversation;Expressed feelings/needs/concerns;Coping;Tearful;Grieving;Receptive

## 2021-04-27 NOTE — PROGRESS NOTES
Subcutaneous TID     insulin lispro  0-6 Units Subcutaneous Nightly    ipratropium-albuterol  1 ampule Inhalation Q4H WA    allopurinol  100 mg Oral Daily    Arformoterol Tartrate  15 mcg Nebulization BID    atorvastatin  20 mg Oral Nightly    budesonide  500 mcg Nebulization BID    busPIRone  5 mg Oral TID    dilTIAZem  120 mg Oral Daily    escitalopram  5 mg Oral Daily    metoprolol tartrate  25 mg Oral BID    insulin lispro  0.05 Units/kg Subcutaneous TID     sodium chloride flush  5-40 mL Intravenous 2 times per day    enoxaparin  40 mg Subcutaneous Daily     PRN Meds: ALPRAZolam, sodium chloride flush, sodium chloride, potassium chloride **OR** potassium alternative oral replacement **OR** potassium chloride, albuterol sulfate HFA, glucose, dextrose, glucagon (rDNA), dextrose, sodium chloride flush, sodium chloride, promethazine **OR** ondansetron, acetaminophen **OR** acetaminophen      Intake/Output Summary (Last 24 hours) at 4/27/2021 1144  Last data filed at 4/26/2021 1700  Gross per 24 hour   Intake 120 ml   Output 50 ml   Net 70 ml       Physical Exam Performed:    /66   Pulse 120   Temp 97 °F (36.1 °C) (Temporal)   Resp (!) 38   Ht 5' 5\" (1.651 m)   Wt 174 lb 6.1 oz (79.1 kg)   SpO2 94%   BMI 29.02 kg/m²     General appearance: Currently on BiPAP, in no acute respiratory distress  HEENT: Pupils equal, round, and reactive to light. Conjunctivae/corneas clear. Neck: Supple, with full range of motion. No jugular venous distention. Trachea midline. Respiratory:  Normal respiratory effort. Clear to auscultation, bilaterally without Rales/Wheezes/Rhonchi. Cardiovascular: Regular rate and rhythm with normal S1/S2 without murmurs, rubs or gallops. Abdomen: Soft, non-tender, non-distended with normal bowel sounds. Musculoskeletal: No clubbing, cyanosis or edema bilaterally. Full range of motion without deformity.   Skin: Skin color, texture, turgor normal.  No rashes or lesions. Neurologic:  Neurovascularly intact without any focal sensory/motor deficits. Cranial nerves: II-XII intact, grossly non-focal.  Psychiatric: Alert, awake and following commands. Capillary Refill: Brisk,3 seconds, normal   Peripheral Pulses: +2 palpable, equal bilaterally       Labs:   No results for input(s): WBC, HGB, HCT, PLT in the last 72 hours. Recent Labs     04/25/21  0310 04/26/21  0830 04/27/21  0531    147* 148*   K 4.6 4.7 4.8    103 104   CO2 40* 38* 34*   BUN 95* 99* 107*   CREATININE 1.2 1.3 1.4*   CALCIUM 8.6 8.7 8.6   PHOS  --  3.4  --      No results for input(s): AST, ALT, BILIDIR, BILITOT, ALKPHOS in the last 72 hours. No results for input(s): INR in the last 72 hours. No results for input(s): Tyree Cobble in the last 72 hours. Urinalysis:      Lab Results   Component Value Date    NITRU Negative 04/19/2021    WBCUA 1 04/19/2021    BACTERIA 1+ 07/22/2017    RBCUA 1 04/19/2021    BLOODU Negative 04/19/2021    SPECGRAV 1.017 04/19/2021    GLUCOSEU Negative 04/19/2021       Radiology:  XR CHEST PORTABLE   Final Result   Bilateral pleural effusions and bilateral pulmonary opacities persist, not   significantly changed         XR CHEST PORTABLE   Final Result   Persistent pleural-parenchymal disease bilaterally. No significant change         XR CHEST PORTABLE   Final Result   Persistent pulmonary edema pattern with asymmetric airspace disease in the   lung bases greater on the left with pleural effusion. This is superimposed   upon interstitial lung disease and emphysema as best demonstrated on recent   CT. CT CHEST WO CONTRAST   Final Result   Bilateral lower lobe airspace disease suspicious for pneumonia, with small   right pleural effusion. This is occurring on a background of pulmonary   emphysema and pulmonary fibrosis with chronic left pleural thickening.    Interval mediastinal lymph node enlargement is likely reactive but could be   reassessed with chest CT when the patient is clinically baseline         XR CHEST PORTABLE   Final Result   Extensive bilateral airspace opacities, which may be related to multifocal   pneumonia or pulmonary edema. Small to moderate bilateral pleural effusions. Suspected cardiomegaly. Assessment/Plan:    Active Hospital Problems    Diagnosis    Atrial arrhythmia [I49.8]    Acute on chronic diastolic heart failure (HCC) [I50.33]    Pulmonary hypertension (HCC) [I27.20]     Acute on chronic respiratory failure with hypoxia and hypercapnia requiring noninvasive mechanical ventilation currently on BiPAP, unable to wean him off  Pulmonary fibrosis  Pulmonary hypertension  Acute on chronic diastolic congestive heart failure  History of COPD  Uncontrolled diabetes type 2 with hyperglycemia  History of paroxysmal atrial fibrillation  Mild hyperkalemia  Chronic respiratory failure with 10 L/min supplemental oxygen at baseline  Obstructive sleep apnea    Pulmonary on board  On Pulmicort and DuoNeb and Solu-Medrol  Continue BiPAP  He is getting you home BiPAP machine per pulmonary  Hospice has been consulted  Currently patient is DNR-CCA    Initially he required Lasix gtt. Cardiology on board  Managing Lasix currently is 20 mg IV push daily    Is growing Serratia in sputum  Has been on cefepime, will complete today. Palliative care on board, [facility] [place] has been consulted. Waiting for BiPAP per pulmonary. He will also need PICC line which will be placed today    Troponinemia, echocardiogram, with EF 59-58%, diastolic dysfunction, troponin with non-ACS trend    DVT Prophylaxis: Lovenox  Diet: Dietary Nutrition Supplements: Low Calorie High Protein Supplement  DIET LOW SODIUM 2 GM; Carb Control: 5 carb choices (75 gms)/meal; Dysphagia Soft and Bite-Sized; 2000 ml;  No Drinking Straw  Dietary Nutrition Supplements: Other Oral Supplement (see comment)  Code Status: DNR-CCA    PT/OT Eval Status: When appropriate    Dispo -continue ICU  Level of care. Once PICC line and BiPAP machine placed. We will plan to discharge home with home hospice.     Don Hogan MD

## 2021-04-27 NOTE — PROGRESS NOTES
[facility] Pulmonary and Critical Care  Progress note      Interval history: Patient sitting in chair in mild respiratory distress. At the time of interview patient was on high flow nasal cannula at 12 L/min. Otherwise remains dependent on BiPAP therapy. Currently requiring FiO2 of 70%. Receiving as needed doses of p.o. Lasix. Nutrition remains compromised. 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     VENTRAL HERNIA REPAIR  08/2009     Current Medications:    Current Facility-Administered Medications: ALPRAZolam (XANAX) tablet 0.5 mg, 0.5 mg, Oral, Q8H PRN  [START ON 4/28/2021] furosemide (LASIX) injection 20 mg, 20 mg, Intravenous, Daily  sodium chloride flush 0.9 % injection 5-40 mL, 5-40 mL, Intravenous, 2 times per day  sodium chloride flush 0.9 % injection 5-40 mL, 5-40 mL, Intravenous, PRN  0.9 % sodium chloride infusion, 25 mL, Intravenous, PRN  pantoprazole (PROTONIX) tablet 40 mg, 40 mg, Oral, QAM AC  polyethylene glycol (GLYCOLAX) packet 17 g, 17 g, Oral, Every Other Day  insulin glargine (LANTUS;BASAGLAR) injection pen 25 Units, 25 Units, Subcutaneous, Nightly  cefepime (MAXIPIME) 2000 mg IVPB minibag, 2,000 mg, Intravenous, 2 times per day  methylPREDNISolone sodium (SOLU-MEDROL) injection 40 mg, 40 mg, Intravenous, Q12H  spironolactone (ALDACTONE) tablet 12.5 mg, 12.5 mg, Oral, Daily  insulin lispro (1 Unit Dial) 0-12 Units, 0-12 Units, Subcutaneous, TID WC  insulin lispro (1 Unit Dial) 0-6 Units, 0-6 Units, Subcutaneous, Nightly  potassium chloride (KLOR-CON M) extended release tablet 40 mEq, 40 mEq, Oral, PRN **OR** potassium bicarb-citric acid (EFFER-K) effervescent tablet 40 mEq, 40 mEq, Oral, PRN **OR** potassium chloride 10 mEq/100 mL IVPB (Peripheral Line), 10 mEq, Intravenous, PRN  albuterol sulfate  (90 Base) MCG/ACT inhaler 2 puff, 2 puff, Inhalation, Daily PRN  ipratropium-albuterol (DUONEB) nebulizer solution 1 ampule, 1 ampule, Inhalation, Q4H WA  allopurinol (ZYLOPRIM) tablet 100 mg, 100 mg, Oral, Daily  Arformoterol Tartrate (BROVANA) nebulizer solution 15 mcg, 15 mcg, Nebulization, BID  atorvastatin (LIPITOR) tablet 20 mg, 20 mg, Oral, Nightly  budesonide (PULMICORT) nebulizer suspension 500 mcg, 500 mcg, Nebulization, BID  busPIRone (BUSPAR) tablet 5 mg, 5 mg, Oral, TID  dilTIAZem (CARDIZEM CD) extended release capsule 120 mg, 120 mg, Oral, Daily  escitalopram (LEXAPRO) tablet 5 mg, 5 mg, Oral, Daily  metoprolol tartrate (LOPRESSOR) tablet 25 mg, 25 mg, Oral, BID  glucose (GLUTOSE) 40 % oral gel 15 g, 15 g, Oral, PRN  dextrose 50 % IV solution, 12.5 g, Intravenous, PRN  glucagon (rDNA) injection 1 mg, 1 mg, Intramuscular, PRN  dextrose 5 % solution, 100 mL/hr, Intravenous, PRN  insulin lispro (1 Unit Dial) 4 Units, 0.05 Units/kg, Subcutaneous, TID WC  sodium chloride flush 0.9 % injection 5-40 mL, 5-40 mL, Intravenous, 2 times per day  sodium chloride flush 0.9 % injection 10 mL, 10 mL, Intravenous, PRN  0.9 % sodium chloride infusion, 25 mL, Intravenous, PRN  promethazine (PHENERGAN) tablet 12.5 mg, 12.5 mg, Oral, Q6H PRN **OR** ondansetron (ZOFRAN) injection 4 mg, 4 mg, Intravenous, Q6H PRN  acetaminophen (TYLENOL) tablet 650 mg, 650 mg, Oral, Q6H PRN **OR** acetaminophen (TYLENOL) suppository 650 mg, 650 mg, Rectal, Q6H PRN  enoxaparin (LOVENOX) injection 40 mg, 40 mg, Subcutaneous, Daily    Allergies   Allergen Reactions    Aspirin Hives and Swelling    Dilaudid [Hydromorphone Hcl] Other (See Comments)     Severe hallucination       Social History:    TOBACCO:   reports that he quit smoking about 20 years ago. His smoking use included cigarettes.  He has a 100.00 pack-year smoking history. He has never used smokeless tobacco.  ETOH:   reports current alcohol use of about 8.0 standard drinks of alcohol per week. Patient currently lives independently  Environmental/chemical exposure: None known    Family History:       Problem Relation Age of Onset    Early Death Mother 43        ? MI    Alcohol Abuse Father     Colon Cancer Sister     Emphysema Brother         black lung    Colon Cancer Sister     Cancer Brother         stomach    Cancer Brother         liver    Lung Cancer Brother     Colon Cancer Brother     Kidney Disease Brother     Other Brother         sydenham chorea     REVIEW OF SYSTEMS:    CONSTITUTIONAL:  negative for fevers, chills, diaphoresis, activity change, appetite change, fatigue, night sweats and unexpected weight change. EYES:  negative for blurred vision, eye discharge, visual disturbance and icterus  HEENT:  negative for hearing loss, tinnitus, ear drainage, sinus pressure, nasal congestion, epistaxis and snoring  RESPIRATORY:  See HPI  CARDIOVASCULAR:  negative for chest pain, palpitations, exertional chest pressure/discomfort, edema, syncope  GASTROINTESTINAL:  negative for nausea, vomiting, diarrhea, constipation, blood in stool and abdominal pain  GENITOURINARY:  negative for frequency, dysuria, urinary incontinence, decreased urine volume, and hematuria  HEMATOLOGIC/LYMPHATIC:  negative for easy bruising, bleeding and lymphadenopathy  ALLERGIC/IMMUNOLOGIC:  negative for recurrent infections, angioedema, anaphylaxis and drug reactions  ENDOCRINE:  negative for weight changes and diabetic symptoms including polyuria, polydipsia and polyphagia  MUSCULOSKELETAL:  negative for  pain, joint swelling, decreased range of motion and muscle weakness  NEUROLOGICAL:  negative for headaches, slurred speech, unilateral weakness  PSYCHIATRIC/BEHAVIORAL: negative for hallucinations, behavioral problems, confusion and agitation.      Objective:   PHYSICAL EXAM: VITALS:  /66   Pulse 120   Temp 97 °F (36.1 °C) (Temporal)   Resp (!) 40   Ht 5' 5\" (1.651 m)   Wt 174 lb 6.1 oz (79.1 kg)   SpO2 94%   BMI 29.02 kg/m²      24HR INTAKE/OUTPUT:      Intake/Output Summary (Last 24 hours) at 4/27/2021 1254  Last data filed at 4/26/2021 1700  Gross per 24 hour   Intake 120 ml   Output --   Net 120 ml     CONSTITUTIONAL:  awake, alert, cooperative, no apparent distress, and appears stated age  NECK:  Supple, symmetrical, trachea midline, no adenopathy, thyroid symmetric, not enlarged and no tenderness, skin normal  LUNGS: Bilateral coarse crackles heard. No wheezing heard. CARDIOVASCULAR: S1 and S2, no edema and no JVD  ABDOMEN:  normal bowel sounds, non-distended and no masses palpated, and no tenderness to palpation. No hepatospleenomegaly  LYMPHADENOPATHY:  no axillary or supraclavicular adenopathy. No cervical adnenopathy  PSYCHIATRIC: Oriented to person place and time. No obvious depression or anxiety. MUSCULOSKELETAL: No obvious misalignment or effusion of the joints. No clubbing, cyanosis of the digits. SKIN:  normal skin color, texture, turgor and no redness, warmth, or swelling. No palpable nodules    DATA:    Old records have been reviewed    CBC:  No results for input(s): WBC, RBC, HGB, HCT, PLT, MCV, MCH, MCHC, RDW, NRBC, SEGSPCT, BANDSPCT in the last 72 hours. BMP:  Recent Labs     04/25/21  0310 04/26/21  0830 04/27/21  0531    147* 148*   K 4.6 4.7 4.8    103 104   CO2 40* 38* 34*   BUN 95* 99* 107*   CREATININE 1.2 1.3 1.4*   CALCIUM 8.6 8.7 8.6   GLUCOSE 155* 201* 174*      ABG:  No results for input(s): PHART, [id number], PO2ART, PHX2WNM, F3FHZOLM, BEART in the last 72 hours. Procalcitonin  No results for input(s): PROCAL in the last 72 hours. No results found for: BNP  Lab Results   Component Value Date    TROPONINI <0.01 04/19/2021           Radiology Review:  All pertinent images / reports were reviewed as a part of this visit.

## 2021-04-27 NOTE — PROGRESS NOTES
Palliative Care: Follow up visit with patient and wife at bedside. Both in agreement at this point to consult with Hospice. Selected [facility]. Perfect Serve to physician for order. Consult sent. Will continue to follow.

## 2021-04-28 NOTE — PROGRESS NOTES
CLINICAL PHARMACY NOTE: MEDS TO [address] Select Patient?: Yes  Total # of Prescriptions Filled: 2   The following medications were delivered to the patient:  · Morphine ER 15 mg  · Lorazepam 0.5mg  Total # of Interventions Completed: 0  Time Spent (min): 15    Additional Documentation:    Nurse Bertram Abad RN from Hospice picked up from [address] [facility]

## 2021-04-28 NOTE — DISCHARGE SUMMARY
Hospital Medicine Discharge Summary    Patient ID: Jaelyn Rueda      Patient's PCP: Anderson Sapp MD    Admit Date: 4/18/2021     Discharge Date:   4/28/21    Admitting Physician: Jama Hughes MD     Discharge Physician: Danii Banks MD     Discharge Diagnoses: Active Hospital Problems    Diagnosis    Atrial arrhythmia [I49.8]    Acute on chronic diastolic heart failure (HCC) [I50.33]    Pulmonary hypertension ([address] [I27.20]       The patient was seen and examined on day of discharge and this discharge summary is in conjunction with any daily progress note from day of discharge. Hospital Course:     80 y. o. male with history of severe COPD, pulmonary fibrosis, chronic respiratory failure with hypoxia and hypercapnia (10 L/min supplemental oxygen at baseline), severe pulmonary hypertension, obstructive sleep apnea, chronic diastolic CHF, chronic steroid use (prednisone 20 mg daily), obesity with BMI of 31 kg/m², who was recently evaluated by primary care physician within the past 2 weeks for ongoing cough, shortness of breath, sputum production, recently placed on steroid taper and doxycycline, who presents to the emergency room with complaints of worsening cough, shortness of breath, over the last 2 days. Vivienne Maldonado does not have fever or chills.  He has had progressive worsening of shortness of breath over the last 6 months.  He was noted to be saturating 87% on 11 L via nasal cannula, transitioned to 100% supplemental oxygen via nonrebreather and had to be transitioned to BiPAP in the emergency room.      He has been treated for the flu medical conditions are during the hospital stay    Acute on chronic respiratory failure with hypoxia and hypercapnia requiring noninvasive mechanical ventilation currently on BiPAP, unable to wean him off  Pulmonary fibrosis  Pulmonary hypertension  Acute on chronic diastolic congestive heart failure  History of COPD  Uncontrolled diabetes type 2 with hyperglycemia  History of paroxysmal atrial fibrillation  Mild hyperkalemia  Chronic respiratory failure with 10 L/min supplemental oxygen at baseline  Obstructive sleep apnea     Pulmonary on board  On Pulmicort and DuoNeb and Solu-Medrol  Continue BiPAP  He is getting you home BiPAP machine per pulmonary  Hospice has been consulted  Currently patient is DNR-CCA     Initially he required Lasix gtt. Cardiology on board  Managing Lasix currently is 20 mg IV push daily     Is growing Serratia in sputum  Has been on cefepime, will complete today.     Palliative care on board, hosp[facility] [place] has been consulted. BiPAP per pulmonary. He will also need PICC line which will be placed      Troponinemia, echocardiogram, with EF 94-01%, diastolic dysfunction, troponin with non-ACS trend    Physical Exam Performed:     BP (!) 104/56   Pulse 87   Temp 98.5 °F (36.9 °C) (Temporal)   Resp (!) 34   Ht 5' 5\" (1.651 m)   Wt 174 lb 9.7 oz (79.2 kg)   SpO2 (!) 88%   BMI 29.06 kg/m²       General appearance:  No apparent distress, appears stated age and cooperative. HEENT:  Normal cephalic, atraumatic without obvious deformity. Pupils equal, round, and reactive to light. Extra ocular muscles intact. Conjunctivae/corneas clear. Neck: Supple, with full range of motion. No jugular venous distention. Trachea midline. Respiratory:  Normal respiratory effort. Clear to auscultation, bilaterally without Rales/Wheezes/Rhonchi. Cardiovascular:  Regular rate and rhythm with normal S1/S2 without murmurs, rubs or gallops. Abdomen: Soft, non-tender, non-distended with normal bowel sounds. Musculoskeletal:  No clubbing, cyanosis or edema bilaterally. Full range of motion without deformity. Skin: Skin color, texture, turgor normal.  No rashes or lesions. Neurologic:  Neurovascularly intact without any focal sensory/motor deficits.  Cranial nerves: II-XII intact, grossly non-focal.  Psychiatric:  Alert and oriented, thought content appropriate, normal insight  Capillary Refill: Brisk,< 3 seconds   Peripheral Pulses: +2 palpable, equal bilaterally       Labs: For convenience and continuity at follow-up the following most recent labs are provided:      CBC:    Lab Results   Component Value Date    WBC 10.1 04/21/2021    HGB 11.6 04/21/2021    HCT 36.9 04/21/2021     04/21/2021       Renal:    Lab Results   Component Value Date     04/27/2021    K 4.8 04/27/2021     04/27/2021    CO2 34 04/27/2021     04/27/2021    CREATININE 1.4 04/27/2021    CALCIUM 8.6 04/27/2021    PHOS 3.4 04/26/2021         Significant Diagnostic Studies    Radiology:   XR CHEST PORTABLE   Final Result   Bilateral pleural effusions and bilateral pulmonary opacities persist, not   significantly changed         XR CHEST PORTABLE   Final Result   Persistent pleural-parenchymal disease bilaterally. No significant change         XR CHEST PORTABLE   Final Result   Persistent pulmonary edema pattern with asymmetric airspace disease in the   lung bases greater on the left with pleural effusion. This is superimposed   upon interstitial lung disease and emphysema as best demonstrated on recent   CT. CT CHEST WO CONTRAST   Final Result   Bilateral lower lobe airspace disease suspicious for pneumonia, with small   right pleural effusion. This is occurring on a background of pulmonary   emphysema and pulmonary fibrosis with chronic left pleural thickening. Interval mediastinal lymph node enlargement is likely reactive but could be   reassessed with chest CT when the patient is clinically baseline         XR CHEST PORTABLE   Final Result   Extensive bilateral airspace opacities, which may be related to multifocal   pneumonia or pulmonary edema. Small to moderate bilateral pleural effusions. Suspected cardiomegaly.                 Consults:     IP CONSULT TO CRITICAL CARE  IP CONSULT TO PALLIATIVE CARE  IP CONSULT TO DIETITIAN  IP CONSULT TO PALLIATIVE CARE  IP CONSULT TO CARDIOLOGY  IP CONSULT TO PALLIATIVE CARE  IP CONSULT TO HOSPICE    Disposition: Discharged home with home hospice on BiPAP    Condition at Discharge: Terminal    Discharge Instructions/Follow-up: Hospice [facility], morphine and Ativan as needed, continue on BiPAP, with nasal cannula as needed during the day    Code Status:  DNR-CCA     Activity: activity as tolerated    Diet: cardiac diet      Discharge Medications:     Current Discharge Medication List           Details   ipratropium-albuterol (DUONEB) 0.5-2.5 (3) MG/3ML SOLN nebulizer solution Inhale 3 mLs into the lungs every 4 hours (while awake)  Qty: 360 mL, Refills: 3      spironolactone (ALDACTONE) 25 MG tablet Take 0.5 tablets by mouth daily  Qty: 30 tablet, Refills: 3      polyethylene glycol (GLYCOLAX) 17 g packet Take 17 g by mouth every other day  Qty: 527 g, Refills: 1      Morphine Sulfate ER (ARYMO ER) 15 MG TBEA Take 15 mg by mouth 2 times daily for 3 days. Intended supply: 30 days  Qty: 6 each, Refills: 0    Comments: Reduce doses taken as pain becomes manageable  Associated Diagnoses: Pulmonary fibrosis (HCC)      LORazepam (ATIVAN) 0.5 MG tablet Take 1 tablet by mouth every 8 hours as needed for Anxiety for up to 3 days.   Qty: 9 tablet, Refills: 0    Associated Diagnoses: Pulmonary fibrosis ([address]              Details   busPIRone (BUSPAR) 5 MG tablet Take 1 tablet by mouth 3 times daily  Qty: 270 tablet, Refills: 3      albuterol (PROVENTIL) (2.5 MG/3ML) 0.083% nebulizer solution Take 3 mLs by nebulization every 6 hours as needed for Wheezing DX J44.9, J43.2, J84.10  Qty: 360 each, Refills: 3      furosemide (LASIX) 20 MG tablet Take 1 tablet by mouth daily  Qty: 30 tablet, Refills: 1      blood glucose test strips (ONETOUCH VERIO) strip USE TO TEST DAILY  Qty: 50 strip, Refills: 11    Comments: E11.9 DX  Associated Diagnoses: Type 2 diabetes mellitus without complication, without long-term current use of insulin ([facility])      OneTouch Delica Lancets 45F MISC Use to check blood sugar once daily. E11.9  Qty: 100 each, Refills: 12    Associated Diagnoses: Type 2 diabetes mellitus without complication, without long-term current use of insulin ([facility])      clopidogrel (PLAVIX) 75 MG tablet Take 1 tablet by mouth daily  Qty: 90 tablet, Refills: 3      alendronate (FOSAMAX) 70 MG tablet Take 1 tablet by mouth every 7 days  Qty: 12 tablet, Refills: 3      tiotropium (SPIRIVA HANDIHALER) 18 MCG inhalation capsule Inhale 1 capsule into the lungs daily  Qty: 90 capsule, Refills: 3      escitalopram (LEXAPRO) 5 MG tablet Take 1 tablet by mouth daily  Qty: 90 tablet, Refills: 3      allopurinol (ZYLOPRIM) 100 MG tablet TAKE 1 TABLET DAILY  Qty: 90 tablet, Refills: 3      metoprolol tartrate (LOPRESSOR) 25 MG tablet Take 1 tablet by mouth 2 times daily  Qty: 180 tablet, Refills: 3    Associated Diagnoses: SVT (supraventricular tachycardia) ([facility]); NSVT (nonsustained ventricular tachycardia) ([facility])      dilTIAZem (CARDIZEM CD) 120 MG extended release capsule Take 1 capsule by mouth daily  Qty: 90 capsule, Refills: 3    Associated Diagnoses: SVT (supraventricular tachycardia) ([facility])      budesonide (PULMICORT) 0.5 MG/2ML nebulizer suspension Take 2 mLs by nebulization 2 times daily DX:COPD J44.9  Qty: 120 mL, Refills: 11      Arformoterol Tartrate (BROVANA) 15 MCG/2ML NEBU Take 1 ampule by nebulization 2 times daily DX:COPD J44.9  Qty: 120 mL, Refills: 11      predniSONE (DELTASONE) 20 MG tablet Take 20 mg by mouth daily      albuterol sulfate HFA (PROAIR HFA) 108 (90 Base) MCG/ACT inhaler Inhale 2 puffs into the lungs daily as needed for Shortness of Breath  Qty: 3 Inhaler, Refills: 3      calcium carbonate 600 MG TABS tablet Take 1 tablet by mouth daily       Zinc 50 MG TABS Take 50 mg by mouth daily. Ascorbic Acid (VITAMIN C) 500 MG tablet Take 500 mg by mouth daily.         VITAMIN D, ERGOCALCIFEROL, PO Take 5,000 Units by mouth Daily       Cyanocobalamin (VITAMIN B 12 PO) Take 500 mcg by mouth daily              Time Spent on discharge is more than 30 minutes in the examination, evaluation, counseling and review of medications and discharge plan. Signed:    Chanelle Sam MD   4/28/2021      Thank you Enid Closs, MD for the opportunity to be involved in this patient's care. If you have any questions or concerns please feel free to contact me at [phone].

## 2021-04-28 NOTE — PROGRESS NOTES
Physical Therapy  Facility/Department: [facility]  Daily Treatment Note  NAME: Farhan Aguillon  : 10/3/1932  MRN: [id number]    Date of Service: 2021    Discharge Recommendations:  Farhan Aguillon scored a 9/24 on the AM-PAC short mobility form. Current research shows that an AM-PAC score of 18 or greater is typically associated with a discharge to the patient's home setting. Based on the patient's AM-PAC score and their current functional mobility deficits, it is recommended that the patient have 2-3 sessions per week of Physical Therapy at d/c to increase the patient's independence. At this time, this patient demonstrates the endurance and safety to discharge home with home services and a follow up treatment frequency of 2-3x/wk. Please see assessment section for further patient specific details. If patient discharges prior to next session this note will serve as a discharge summary. Please see below for the latest assessment towards goals. HOME HEALTH CARE: LEVEL 4 SICK     - Initial home health evaluation to occur within 24-48 hours, in patient home   - Therapy evaluations in home within 24-48 hours of discharge; including DME and home safety   - Frontload therapy 5 days, then 3x a week   - Therapy to evaluate if patient has [address] needs for personal care   -  evaluation within 24-48 hours, includes evaluation of resources and insurance to determine [place], [place], LTC, and Medicaid options  S Level 4   PT Equipment Recommendations  Equipment Needed: Yes  Mobility Devices: Elba Crouchr: Rolling  Wheelchair: Transport Chair  Other: Patient needs hospital bed as patient is aspiration risk and needs frequent respositioning for skin protection and improved pulmonary function. Patient needs transport w/c for primary means of mobility as unable to ambulate due to poor pulmonary function. Needs transport chair as patient would be unable to self propel.  Needs RW for safe transfers to w/c.    Assessment   Body structures, Functions, Activity limitations: Decreased functional mobility ; Decreased endurance;Decreased strength;Decreased balance  Assessment: Pt Dependent for bed mob and transfers bed to chair. Patient not at baseline function and would benefit from skilled PT to address above deficits and facilitate return to baseline function. Patient signficantly limited by respiratory status. Patient needed decreased assistance this date compared to previous session. Discussed impotance of taking time and rest breaks as patient tends to want to rush through transfer which could be unsafe. Treatment Diagnosis: decreased functional mobility, decreased endurance  Prognosis: Fair  Decision Making: Medium Complexity  Clinical Presentation: evolving  PT Education: Goals;PT Role;Plan of Care;Family Education;Equipment  Patient Education: Significant time spend on education on energy conservation, DME needs and safety in home with spouse and patient. All questions answered. -Spouse verbalized understanding (patient unable to verbalize understanding due to bipap) 4/23: educated spouse on transfer training and safety at home, educated patient and spouse on importance of taking rest breaks (ie resting on edge of bed to recover prior to transferring to chair) and importance of allowing patient to do as much as he can for himself to maintain strength and mobility and to protect skin/joints etc.  Barriers to Learning: none  REQUIRES PT FOLLOW UP: Yes  Activity Tolerance  Activity Tolerance: Patient limited by endurance; Patient limited by fatigue     Patient Diagnosis(es): The primary encounter diagnosis was COPD exacerbation ([address]  Diagnoses of Acute on chronic congestive heart failure, unspecified heart failure type ([address] Acute on chronic respiratory failure with hypoxia and hypercapnia ([address] Pulmonary hypertension ([address] Pulmonary fibrosis ([address] and Centrilobular emphysema ([address] were also pertinent to this visit.     has a past medical history of Arthritis, Emphysema of lung ([address] Full dentures, Hearing aid worn, Hypertension, On home oxygen therapy, Pulmonary fibrosis ([address] and Sleep apnea. 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 Restrictions  Restrictions/Precautions  Restrictions/Precautions: Fall Risk  Required Braces or Orthoses?: No  Position Activity Restriction  Other position/activity restrictions: Alcira Schrader is a 80 y.o. male who presents to the emergency department today for evaluation for shortness of breath. The patient has a history of COPD/emphysema, history of pulmonary fibrosis. The patient is followed by Dr. Trista Kan, pulmonology. The patient states that since Friday he has had increasing cough, and increasing shortness of breath. The wife is at bedside and she states that the cough has been productive of thick mucus, and so she states that she started him on doxycycline as prescribed by Dr. Hilda Banuelos. She states that she also started him on the prednisone. She states that the patient's shortness of breath has been worse over the past 3 days, when patient arrived to the ED he is on a nonrebreather, and O2 sats are 89% on room air the patient denies any chest pain. He is not had any fever or chills. Is not had any abdominal pain, nausea, vomiting or diarrhea. No urinary symptoms. Patient has not had any congestion, he states that he has been vaccinated for COVID-19. He states that he has noticed some lower leg edema but does not feel that he has gained any weight, and the patient otherwise has no complaints at this time. Subjective   General  Chart Reviewed: Yes  Response To Previous Treatment: Patient with no complaints from previous session.   Family / Program  Safety Devices  Type of devices: All fall risk precautions in place, Call light within reach, Nurse notified, Left in chair, Chair alarm in place, Gait belt  Restraints  Initially in place: No     Therapy Time   Individual Concurrent Group Co-treatment   Time In       1050   Time Out       1130   Minutes       40   Timed Code Treatment Minutes: [address] [facility], [address]  I agree with the above note and have addressed this patient's goals.   Navarro Ferreira, PT, DPT, [id number]

## 2021-04-28 NOTE — PROGRESS NOTES
PRN  ipratropium-albuterol (DUONEB) nebulizer solution 1 ampule, 1 ampule, Inhalation, Q4H WA  allopurinol (ZYLOPRIM) tablet 100 mg, 100 mg, Oral, Daily  Arformoterol Tartrate (BROVANA) nebulizer solution 15 mcg, 15 mcg, Nebulization, BID  atorvastatin (LIPITOR) tablet 20 mg, 20 mg, Oral, Nightly  budesonide (PULMICORT) nebulizer suspension 500 mcg, 500 mcg, Nebulization, BID  busPIRone (BUSPAR) tablet 5 mg, 5 mg, Oral, TID  dilTIAZem (CARDIZEM CD) extended release capsule 120 mg, 120 mg, Oral, Daily  escitalopram (LEXAPRO) tablet 5 mg, 5 mg, Oral, Daily  metoprolol tartrate (LOPRESSOR) tablet 25 mg, 25 mg, Oral, BID  glucose (GLUTOSE) 40 % oral gel 15 g, 15 g, Oral, PRN  dextrose 50 % IV solution, 12.5 g, Intravenous, PRN  glucagon (rDNA) injection 1 mg, 1 mg, Intramuscular, PRN  dextrose 5 % solution, 100 mL/hr, Intravenous, PRN  insulin lispro (1 Unit Dial) 4 Units, 0.05 Units/kg, Subcutaneous, TID WC  sodium chloride flush 0.9 % injection 5-40 mL, 5-40 mL, Intravenous, 2 times per day  sodium chloride flush 0.9 % injection 10 mL, 10 mL, Intravenous, PRN  0.9 % sodium chloride infusion, 25 mL, Intravenous, PRN  promethazine (PHENERGAN) tablet 12.5 mg, 12.5 mg, Oral, Q6H PRN **OR** ondansetron (ZOFRAN) injection 4 mg, 4 mg, Intravenous, Q6H PRN  acetaminophen (TYLENOL) tablet 650 mg, 650 mg, Oral, Q6H PRN **OR** acetaminophen (TYLENOL) suppository 650 mg, 650 mg, Rectal, Q6H PRN  enoxaparin (LOVENOX) injection 40 mg, 40 mg, Subcutaneous, Daily    Allergies   Allergen Reactions    Aspirin Hives and Swelling    Dilaudid [Hydromorphone Hcl] Other (See Comments)     Severe hallucination       Social History:    TOBACCO:   reports that he quit smoking about 20 years ago. His smoking use included cigarettes. He has a 100.00 pack-year smoking history. He has never used smokeless tobacco.  ETOH:   reports current alcohol use of about 8.0 standard drinks of alcohol per week.   Patient currently lives independently  Environmental/chemical exposure: None known    Family History:       Problem Relation Age of Onset    Early Death Mother 43        ? MI    Alcohol Abuse Father     Colon Cancer Sister     Emphysema Brother         black lung    Colon Cancer Sister     Cancer Brother         stomach    Cancer Brother         liver    Lung Cancer Brother     Colon Cancer Brother     Kidney Disease Brother     Other Brother         sydenham chorea     REVIEW OF SYSTEMS:    CONSTITUTIONAL:  negative for fevers, chills, diaphoresis, activity change, appetite change, fatigue, night sweats and unexpected weight change. EYES:  negative for blurred vision, eye discharge, visual disturbance and icterus  HEENT:  negative for hearing loss, tinnitus, ear drainage, sinus pressure, nasal congestion, epistaxis and snoring  RESPIRATORY:  See HPI  CARDIOVASCULAR:  negative for chest pain, palpitations, exertional chest pressure/discomfort, edema, syncope  GASTROINTESTINAL:  negative for nausea, vomiting, diarrhea, constipation, blood in stool and abdominal pain  GENITOURINARY:  negative for frequency, dysuria, urinary incontinence, decreased urine volume, and hematuria  HEMATOLOGIC/LYMPHATIC:  negative for easy bruising, bleeding and lymphadenopathy  ALLERGIC/IMMUNOLOGIC:  negative for recurrent infections, angioedema, anaphylaxis and drug reactions  ENDOCRINE:  negative for weight changes and diabetic symptoms including polyuria, polydipsia and polyphagia  MUSCULOSKELETAL:  negative for  pain, joint swelling, decreased range of motion and muscle weakness  NEUROLOGICAL:  negative for headaches, slurred speech, unilateral weakness  PSYCHIATRIC/BEHAVIORAL: negative for hallucinations, behavioral problems, confusion and agitation.      Objective:   PHYSICAL EXAM:      VITALS:  BP (!) 104/56   Pulse 87   Temp 98.5 °F (36.9 °C) (Temporal)   Resp (!) 34   Ht 5' 5\" (1.651 m)   Wt 174 lb 9.7 oz (79.2 kg)   SpO2 (!) 88%   BMI 29.06 kg/m²      24HR INTAKE/OUTPUT:      Intake/Output Summary (Last 24 hours) at 4/28/2021 1424  Last data filed at 4/28/2021 0900  Gross per 24 hour   Intake 60 ml   Output --   Net 60 ml     CONSTITUTIONAL:  awake, alert, cooperative, no apparent distress, and appears stated age  NECK:  Supple, symmetrical, trachea midline, no adenopathy, thyroid symmetric, not enlarged and no tenderness, skin normal  LUNGS: Bilateral coarse crackles heard. No wheezing heard. CARDIOVASCULAR: S1 and S2, no edema and no JVD  ABDOMEN:  normal bowel sounds, non-distended and no masses palpated, and no tenderness to palpation. No hepatospleenomegaly  LYMPHADENOPATHY:  no axillary or supraclavicular adenopathy. No cervical adnenopathy  PSYCHIATRIC: Oriented to person place and time. No obvious depression or anxiety. MUSCULOSKELETAL: No obvious misalignment or effusion of the joints. No clubbing, cyanosis of the digits. SKIN:  normal skin color, texture, turgor and no redness, warmth, or swelling. No palpable nodules    DATA:    Old records have been reviewed    CBC:  No results for input(s): WBC, RBC, HGB, HCT, PLT, MCV, MCH, MCHC, RDW, NRBC, SEGSPCT, BANDSPCT in the last 72 hours. BMP:  Recent Labs     04/26/21  0830 04/27/21  0531   * 148*   K 4.7 4.8    104   CO2 38* 34*   BUN 99* 107*   CREATININE 1.3 1.4*   CALCIUM 8.7 8.6   GLUCOSE 201* 174*      ABG:  No results for input(s): PHART, XVW8STO, PO2ART, EKA4FYC, E8TBUSAK, BEART in the last 72 hours. Procalcitonin  No results for input(s): PROCAL in the last 72 hours. No results found for: BNP  Lab Results   Component Value Date    TROPONINI <0.01 04/19/2021           Radiology Review:  All pertinent images / reports were reviewed as a part of this visit. Chest x-ray portable done on 4/24/2021 was personally reviewed by me and my interpretation is: Bilateral lung fields show prominent bronchovascular markings with bilateral pleural effusions.   These

## 2021-04-28 NOTE — PROGRESS NOTES
Occupational Therapy  Facility/Department: [facility]  Daily Treatment Note  NAME: Alcira Schrader  : 10/3/1932  MRN: [id number]    Date of Service: 2021    Discharge Recommendations:  Alcira Schrader scored a  on the AM-PAC ADL Inpatient form. Current research shows that an AM-PAC score of 18 or greater is typically associated with a discharge to the patient's home setting. Based on the patient's AM-PAC score, and their current ADL deficits, it is recommended that the patient have 2-3 sessions per week of Occupational Therapy at d/c to increase the patient's independence. At this time, this patient demonstrates the endurance and safety to discharge home with home services and a follow up treatment frequency of 2-3x/wk. Please see assessment section for further patient specific details. If patient discharges prior to next session this note will serve as a discharge summary. Please see below for the latest assessment towards goals. HOME HEALTH CARE: LEVEL 4 SICK     - Initial home health evaluation to occur within 24-48 hours, in patient home   - Therapy evaluations in home within 24-48 hours of discharge; including DME and home safety   - Frontload therapy 5 days, then 3x a week   - Therapy to evaluate if patient has [address] needs for personal care   -  evaluation within 24-48 hours, includes evaluation of resources and insurance to determine [place], [place], LTC, and Medicaid options    OT Equipment Recommendations  Equipment Needed: Yes  ADL Assistive Devices: Toileting - 3-in-1 Commode  Other: continue to assess    Assessment   Performance deficits / Impairments: Decreased functional mobility ; Decreased ADL status; Decreased strength;Decreased endurance;Decreased balance  Assessment: Pt was dependent for toileting and required brief change before treatment while laying in flat bed. Pt initiated reaching for hand rails during log rolls for brief management.  Pt was on high flow O2 (CPAP) that was monitored throughout the session remaining in low 90's%. Pt was impulsive to stand up without assistance although unable to on his own. Pt would benefit from repetition for safety during transfers. Pt presents with the above deficits and is functioning below baseline level of functioning. Pt would benefit from continued skilled OT services in order to return to PLOF. Treatment Diagnosis: Decreased ADL status, functional mobility, functional transfer, and endurance d/t Acute on chronic diastolic heart failure ([address]  Prognosis: Fair  Decision Making: Medium Complexity  OT Education: OT Role;Transfer Training;Energy Conservation; Family Education;Equipment;Precautions;Plan of Care  Patient Education: pt's spouse verbalized understanding of POC, pt is not an independent learner  REQUIRES OT FOLLOW UP: Yes  Activity Tolerance  Activity Tolerance: Patient Tolerated treatment well;Patient limited by fatigue  Activity Tolerance: O2 saturations 90-92% on CPAP  Safety Devices  Safety Devices in place: Yes  Type of devices: All fall risk precautions in place; Left in chair;Chair alarm in place;Call light within reach; Patient at risk for falls;Nurse notified;Gait belt         Patient Diagnosis(es): The primary encounter diagnosis was COPD exacerbation ([address] Diagnoses of Acute on chronic congestive heart failure, unspecified heart failure type ([address] Acute on chronic respiratory failure with hypoxia and hypercapnia ([address] Pulmonary hypertension ([address] Pulmonary fibrosis ([address] and Centrilobular emphysema ([address] were also pertinent to this visit. has a past medical history of Arthritis, Emphysema of lung ([address] Full dentures, Hearing aid worn, Hypertension, On home oxygen therapy, Pulmonary fibrosis ([address] and Sleep apnea. 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 Restrictions  Restrictions/Precautions  Restrictions/Precautions: Fall Risk  Required Braces or Orthoses?: No  Position Activity Restriction  Other position/activity restrictions: Warren Burt is a 80 y.o. male who presents to the emergency department today for evaluation for shortness of breath. The patient has a history of COPD/emphysema, history of pulmonary fibrosis. The patient is followed by Dr. Liza Biswas, pulmonology. The patient states that since Friday he has had increasing cough, and increasing shortness of breath. The wife is at bedside and she states that the cough has been productive of thick mucus, and so she states that she started him on doxycycline as prescribed by Dr. Juan Diego Yang. She states that she also started him on the prednisone. She states that the patient's shortness of breath has been worse over the past 3 days, when patient arrived to the ED he is on a nonrebreather, and O2 sats are 89% on room air the patient denies any chest pain. He is not had any fever or chills. Is not had any abdominal pain, nausea, vomiting or diarrhea. No urinary symptoms. Patient has not had any congestion, he states that he has been vaccinated for COVID-19. He states that he has noticed some lower leg edema but does not feel that he has gained any weight, and the patient otherwise has no complaints at this time.   Subjective   General  Chart Reviewed: Yes  Patient assessed for rehabilitation services?: Yes  Additional Pertinent Hx: PMH:  severe COPD, pulmonary fibrosis, chronic respiratory failure with hypoxia and hypercapnia (10 L/min supplemental oxygen at baseline), severe pulmonary hypertension, obstructive sleep apnea, chronic diastolic CHF, chronic steroid use (prednisone 20 mg daily), obesity with BMI of 31 kg/m²  Response to previous treatment: Patient with no complaints from previous session  Family / Caregiver Present: Yes(wife)  Referring Practitioner: Virginia Anguiano MD  Diagnosis: Acute on chronic diastolic heart failure (HCC)  Subjective  Subjective: Pt supine in bed upon arrival, agreeable to  OT/PT treatment  Pre Treatment Pain Screening  Intervention List: Patient able to continue with treatment;Nurse/Physician notified  Vital Signs  Patient Currently in Pain: Denies   Orientation     Objective    ADL  Grooming: Dependent/Total(face washing seated EOB)  LE Dressing: Dependent/Total(Dependent to don/doff brief in flat bad using log roll technique)  Toileting: Dependent/Total(Dependent for rear hygiene and kaitlin care as well as don/doffing brief in flat bed)        Balance  Sitting Balance:  Moderate assistance(ModA with periods of CGA seated EOB ~5 minutes)  Standing Balance: Dependent/Total(Sara of 2)  Standing Balance  Activity: Transfer EOB>recliner  Functional Mobility  Functional Mobility Comments: functional mobility not assessed this date, pt on Bipap  Bed mobility  Rolling to Left: Dependent/Total(ModA of 2)  Rolling to Right: Dependent/Total(ModA of 2)  Supine to Sit: Dependent/Total(MaxA of 2)  Scooting: Maximal assistance  Comment: pt initiated reaching for hand rails during rolling  Transfers  Stand Pivot Transfers: Dependent/Total;2 Person assistance(Sara of 2)  Sit to stand: 2 Person assistance;Dependent/Total(Sara of2)  Stand to sit: 2 Person assistance;Dependent/Total(Sara of 2)        Cognition  Overall Cognitive Status: WFL     Perception  Overall Perceptual Status: WFL      Plan   Plan  Times per week: 3-5x/wk  Times per day: Daily  Current Treatment Recommendations: Functional Mobility Training, Endurance Training, Safety Education & Training, Patient/Caregiver Education & Training, Equipment Evaluation, Education, & procurement, Self-Care / ADL, Strengthening, Balance Training    AM-PAC Score        AM-PAC Inpatient Daily Activity Raw Score: 12 (04/28/21 5827)  AM-PAC Inpatient ADL T-Scale Score

## 2021-04-28 NOTE — PLAN OF CARE
Problem: Falls - Risk of:  Goal: Will remain free from falls  Description: Will remain free from falls  Outcome: Met This Shift  Note: Patient did not get out of bed today. When asked/requested to get out of bed to chair, he simply stated, \"No\". He assist with turning at times, but reports that he cannot breath when taken off the biPAP. He made no attempts to move self around except to move off the positioning pillows and back into the supine position. No falls. Goal: Absence of physical injury  Description: Absence of physical injury  Outcome: Met This Shift     Problem: OXYGENATION/RESPIRATORY FUNCTION  Goal: Patient will maintain patent airway  Outcome: Met This Shift  Note: On the BiPAP with oxygen at 68-75%, his sat remained around 88-95%. Airway and gas exchange improved significantly after aggressive pulmonary toilet, as provided by RT Landen. Problem: Skin Integrity:  Goal: Will show no infection signs and symptoms  Description: Will show no infection signs and symptoms  Outcome: Met This Shift  Goal: Absence of new skin breakdown  Description: Absence of new skin breakdown  Outcome: Met This Shift     Problem: Pain:  Description: Pain management should include both nonpharmacologic and pharmacologic interventions. Goal: Pain level will decrease  Description: Pain level will decrease  Outcome: Met This Shift  Note: Patient has denied pain when asked each time that he was awake.      Problem: Musculor/Skeletal Functional Status  Goal: Absence of falls  Outcome: Met This Shift     Problem: HEMODYNAMIC STATUS  Goal: Patient has stable vital signs and fluid balance  Outcome: Ongoing     Problem: FLUID AND ELECTROLYTE IMBALANCE  Goal: Fluid and electrolyte balance are achieved/maintained  Outcome: Ongoing

## 2021-04-28 NOTE — TELEPHONE ENCOUNTER
From: Jay Inman  To: Neelam Guthrie MD  Sent: 4/28/2021 2:24 PM EDT  Subject: Prescription Question    Christensen was sent home with instructions to take 20mg on Law is a day but they did not send me home with a script. Can you send one into [facility]?      Thanks   Amisha

## 2021-04-28 NOTE — PLAN OF CARE
Problem: Falls - Risk of:  Goal: Will remain free from falls  Description: Will remain free from falls  Outcome: Completed  Goal: Absence of physical injury  Description: Absence of physical injury  Outcome: Completed     Problem: OXYGENATION/RESPIRATORY FUNCTION  Goal: Patient will maintain patent airway  Outcome: Completed  Goal: Patient will achieve/maintain normal respiratory rate/effort  Description: Respiratory rate and effort will be within normal limits for the patient  Outcome: Completed     Problem: HEMODYNAMIC STATUS  Goal: Patient has stable vital signs and fluid balance  Outcome: Completed     Problem: FLUID AND ELECTROLYTE IMBALANCE  Goal: Fluid and electrolyte balance are achieved/maintained  Outcome: Completed     Problem: ACTIVITY INTOLERANCE/IMPAIRED MOBILITY  Goal: Mobility/activity is maintained at optimum level for patient  Outcome: Completed     Problem: Skin Integrity:  Goal: Will show no infection signs and symptoms  Description: Will show no infection signs and symptoms  Outcome: Completed  Goal: Absence of new skin breakdown  Description: Absence of new skin breakdown  Outcome: Completed     Problem: Nutrition  Goal: Optimal nutrition therapy  Outcome: Completed     Problem: Pain:  Goal: Pain level will decrease  Description: Pain level will decrease  Outcome: Completed     Problem: Musculor/Skeletal Functional Status  Goal: Highest potential functional level  Outcome: Completed  Goal: Absence of falls  Outcome: Completed

## 2025-06-13 NOTE — PROGRESS NOTES
Hospitalist Progress Note      PCP: Marino Almanzar MD    Date of Admission: 4/18/2021    Chief Complaint: Shortness of breath    Hospital Course:      80 y.o. male with history of severe COPD, pulmonary fibrosis, chronic respiratory failure with hypoxia and hypercapnia (10 L/min supplemental oxygen at baseline), severe pulmonary hypertension, obstructive sleep apnea, chronic diastolic CHF, chronic steroid use (prednisone 20 mg daily), obesity with BMI of 31 kg/m², who was recently evaluated by primary care physician within the past 2 weeks for ongoing cough, shortness of breath, sputum production, recently placed on steroid taper and doxycycline, who presents to the emergency room with complaints of worsening cough, shortness of breath, over the last 2 days. He does not have fever or chills. He has had progressive worsening of shortness of breath over the last 6 months. He was noted to be saturating 87% on 11 L via nasal cannula, transitioned to 100% supplemental oxygen via nonrebreather and had to be transitioned to BiPAP in the emergency room. Of note, patient has been vaccinated for COVID-19. Subjective:   Currently on BiPAP with FiO2 55%  proBNP on admission 2458  Has been worsening in the last 1 to 2 weeks  His baseline is 10 L/min nasal cannula of oxygen  Pulmonary fibrosis and pulmonary hypertension  PCP has been discussing hospice option with family, wife  Currently on Lasix and BiPAP pending echo  Had multiple doses of doxycycline and prednisone recently with PCP and pulmonary outpatient      4/21  Today he is off bipap and tolerating this well.     4/22  Patient is back on BiPAP today he has been taken off of the Lasix drip  He is diuresing better with IV push diuretics  The patient is comfortable on the BiPAP    Medications:  Reviewed    Infusion Medications    dextrose      sodium chloride Stopped (04/22/21 1118)     Scheduled Medications    insulin glargine  15 Units Subcutaneous Nightly    cefepime  2,000 mg Intravenous 2 times per day    furosemide  40 mg Intravenous Daily    methylPREDNISolone  40 mg Intravenous Q12H    spironolactone  12.5 mg Oral Daily    insulin lispro  0-12 Units Subcutaneous TID     insulin lispro  0-6 Units Subcutaneous Nightly    ipratropium-albuterol  1 ampule Inhalation Q4H WA    allopurinol  100 mg Oral Daily    Arformoterol Tartrate  15 mcg Nebulization BID    atorvastatin  20 mg Oral Nightly    budesonide  500 mcg Nebulization BID    busPIRone  5 mg Oral TID    clopidogrel  75 mg Oral Daily    dilTIAZem  120 mg Oral Daily    escitalopram  5 mg Oral Daily    metoprolol tartrate  25 mg Oral BID    insulin lispro  0.05 Units/kg Subcutaneous TID     sodium chloride flush  5-40 mL Intravenous 2 times per day    enoxaparin  40 mg Subcutaneous Daily     PRN Meds: potassium chloride **OR** potassium alternative oral replacement **OR** potassium chloride, albuterol sulfate HFA, glucose, dextrose, glucagon (rDNA), dextrose, sodium chloride flush, sodium chloride, promethazine **OR** ondansetron, polyethylene glycol, acetaminophen **OR** acetaminophen      Intake/Output Summary (Last 24 hours) at 4/22/2021 1618  Last data filed at 4/22/2021 0600  Gross per 24 hour   Intake 120 ml   Output 535 ml   Net -415 ml       Physical Exam Performed:    /77   Pulse 72   Temp 97.7 °F (36.5 °C) (Temporal)   Resp 30   Ht 5' 5\" (1.651 m)   Wt 170 lb 10.2 oz (77.4 kg)   SpO2 94%   BMI 28.40 kg/m²     General appearance: Currently on BiPAP, in no acute respiratory distress  HEENT: Pupils equal, round, and reactive to light. Conjunctivae/corneas clear. Neck: Supple, with full range of motion. No jugular venous distention. Trachea midline. Respiratory:  Normal respiratory effort. Clear to auscultation, bilaterally without Rales/Wheezes/Rhonchi. Cardiovascular: Regular rate and rhythm with normal S1/S2 without murmurs, rubs or gallops.   Abdomen: Soft, could be   reassessed with chest CT when the patient is clinically baseline         XR CHEST PORTABLE   Final Result   Extensive bilateral airspace opacities, which may be related to multifocal   pneumonia or pulmonary edema. Small to moderate bilateral pleural effusions. Suspected cardiomegaly. XR CHEST PORTABLE    (Results Pending)           Assessment/Plan:    Active Hospital Problems    Diagnosis    Acute on chronic diastolic heart failure (HCC) [I50.33]     Acute on chronic respiratory failure with hypoxia and hypercapnia  Pulmonary fibrosis  Pulmonary hypertension  Acute on chronic diastolic congestive heart failure  History of COPD  Uncontrolled diabetes type 2 with hyperglycemia  Troponinemia  History of paroxysmal atrial fibrillation  Mild hyperkalemia  Chronic respiratory failure with 10 L/min supplemental oxygen at baseline  Obstructive sleep apnea        Pulmonary following   Patient had a long history of pulmonary fibrosis and severe pulmonary hypertension   I discussed goals of care and the goal is to make it to a family reunion at the end of June. He has been on prednisone and doxycycline outpatient for the last 2 weeks  Pulmonary has started Solu-Medrol and start prednisone. He is now a [facility], they are planning to go home with palliative care at the time of discharge. There is no leukocytosis and procalcitonin is normal  No indication for antibiotic    Has been on Lasix IV and changed to Lasix drip 5 mg/h, and now changed back to IV Lasix. Patient continues to diurese and has put out over a liter so far today bringing him to nearly 3-1/2 L diuresis. I discussed CODE STATUS with his wife,and he is now limited. I  will obtain palliative care, patient wife agrees not to intubate at this time. However we did discuss this and they might consider intubation if anyone thought he might able to come off the vent. Is currently on BiPAP with good saturation.  They discussed with 4